# Patient Record
Sex: FEMALE | Race: BLACK OR AFRICAN AMERICAN | Employment: OTHER | URBAN - METROPOLITAN AREA
[De-identification: names, ages, dates, MRNs, and addresses within clinical notes are randomized per-mention and may not be internally consistent; named-entity substitution may affect disease eponyms.]

---

## 2017-09-19 ENCOUNTER — HOSPITAL ENCOUNTER (OUTPATIENT)
Dept: OCCUPATIONAL MEDICINE | Age: 52
Discharge: HOME | End: 2017-09-19
Attending: EMERGENCY MEDICINE

## 2017-09-19 DIAGNOSIS — Z00.00 PHYSICAL EXAM, ANNUAL: ICD-10-CM

## 2019-04-30 ENCOUNTER — HOSPITAL ENCOUNTER (EMERGENCY)
Age: 54
Discharge: HOME | End: 2019-04-30
Attending: EMERGENCY MEDICINE
Payer: COMMERCIAL

## 2019-04-30 ENCOUNTER — APPOINTMENT (OUTPATIENT)
Dept: GENERAL RADIOLOGY | Age: 54
End: 2019-04-30
Attending: PHYSICIAN ASSISTANT
Payer: COMMERCIAL

## 2019-04-30 VITALS
HEIGHT: 64 IN | HEART RATE: 95 BPM | TEMPERATURE: 98.1 F | DIASTOLIC BLOOD PRESSURE: 79 MMHG | OXYGEN SATURATION: 96 % | BODY MASS INDEX: 48.14 KG/M2 | WEIGHT: 282 LBS | RESPIRATION RATE: 20 BRPM | SYSTOLIC BLOOD PRESSURE: 134 MMHG

## 2019-04-30 DIAGNOSIS — R07.9 CHEST PAIN, UNSPECIFIED TYPE: Primary | ICD-10-CM

## 2019-04-30 LAB
ALBUMIN SERPL-MCNC: 3.7 G/DL (ref 3.4–5)
ALBUMIN/GLOB SERPL: 0.9 {RATIO} (ref 0.8–1.7)
ALP SERPL-CCNC: 63 U/L (ref 45–117)
ALT SERPL-CCNC: 31 U/L (ref 13–56)
ANION GAP SERPL CALC-SCNC: 5 MMOL/L (ref 3–18)
APPEARANCE UR: CLEAR
AST SERPL-CCNC: 26 U/L (ref 15–37)
ATRIAL RATE: 97 BPM
BASOPHILS # BLD: 0 K/UL (ref 0–0.1)
BASOPHILS NFR BLD: 0 % (ref 0–2)
BILIRUB SERPL-MCNC: 0.9 MG/DL (ref 0.2–1)
BILIRUB UR QL: NEGATIVE
BUN SERPL-MCNC: 11 MG/DL (ref 7–18)
BUN/CREAT SERPL: 13 (ref 12–20)
CALCIUM SERPL-MCNC: 10.3 MG/DL (ref 8.5–10.1)
CALCULATED P AXIS, ECG09: 40 DEGREES
CALCULATED R AXIS, ECG10: 9 DEGREES
CALCULATED T AXIS, ECG11: 43 DEGREES
CHLORIDE SERPL-SCNC: 107 MMOL/L (ref 100–108)
CK MB CFR SERPL CALC: 1 % (ref 0–4)
CK MB SERPL-MCNC: 3.5 NG/ML (ref 5–25)
CK SERPL-CCNC: 354 U/L (ref 26–192)
CO2 SERPL-SCNC: 31 MMOL/L (ref 21–32)
COLOR UR: YELLOW
CREAT SERPL-MCNC: 0.86 MG/DL (ref 0.6–1.3)
DIAGNOSIS, 93000: NORMAL
DIFFERENTIAL METHOD BLD: ABNORMAL
EOSINOPHIL # BLD: 0 K/UL (ref 0–0.4)
EOSINOPHIL NFR BLD: 0 % (ref 0–5)
ERYTHROCYTE [DISTWIDTH] IN BLOOD BY AUTOMATED COUNT: 13.4 % (ref 11.6–14.5)
GLOBULIN SER CALC-MCNC: 4.2 G/DL (ref 2–4)
GLUCOSE SERPL-MCNC: 88 MG/DL (ref 74–99)
GLUCOSE UR STRIP.AUTO-MCNC: NEGATIVE MG/DL
HCT VFR BLD AUTO: 39.5 % (ref 35–45)
HGB BLD-MCNC: 12.3 G/DL (ref 12–16)
HGB UR QL STRIP: NEGATIVE
KETONES UR QL STRIP.AUTO: 15 MG/DL
LEUKOCYTE ESTERASE UR QL STRIP.AUTO: NEGATIVE
LIPASE SERPL-CCNC: 141 U/L (ref 73–393)
LYMPHOCYTES # BLD: 1.8 K/UL (ref 0.9–3.6)
LYMPHOCYTES NFR BLD: 17 % (ref 21–52)
MAGNESIUM SERPL-MCNC: 2 MG/DL (ref 1.6–2.6)
MCH RBC QN AUTO: 28.8 PG (ref 24–34)
MCHC RBC AUTO-ENTMCNC: 31.1 G/DL (ref 31–37)
MCV RBC AUTO: 92.5 FL (ref 74–97)
MONOCYTES # BLD: 0.6 K/UL (ref 0.05–1.2)
MONOCYTES NFR BLD: 6 % (ref 3–10)
NEUTS SEG # BLD: 7.7 K/UL (ref 1.8–8)
NEUTS SEG NFR BLD: 77 % (ref 40–73)
NITRITE UR QL STRIP.AUTO: NEGATIVE
P-R INTERVAL, ECG05: 186 MS
PH UR STRIP: >8.5 [PH] (ref 5–8)
PLATELET # BLD AUTO: 306 K/UL (ref 135–420)
PMV BLD AUTO: 9.8 FL (ref 9.2–11.8)
POTASSIUM SERPL-SCNC: 3.7 MMOL/L (ref 3.5–5.5)
PROT SERPL-MCNC: 7.9 G/DL (ref 6.4–8.2)
PROT UR STRIP-MCNC: NEGATIVE MG/DL
Q-T INTERVAL, ECG07: 374 MS
QRS DURATION, ECG06: 92 MS
QTC CALCULATION (BEZET), ECG08: 474 MS
RBC # BLD AUTO: 4.27 M/UL (ref 4.2–5.3)
SODIUM SERPL-SCNC: 143 MMOL/L (ref 136–145)
SP GR UR REFRACTOMETRY: 1.02 (ref 1–1.03)
TROPONIN I SERPL-MCNC: <0.02 NG/ML (ref 0–0.04)
TROPONIN I SERPL-MCNC: <0.02 NG/ML (ref 0–0.04)
UROBILINOGEN UR QL STRIP.AUTO: 1 EU/DL (ref 0.2–1)
VENTRICULAR RATE, ECG03: 97 BPM
WBC # BLD AUTO: 10.1 K/UL (ref 4.6–13.2)

## 2019-04-30 PROCEDURE — 99284 EMERGENCY DEPT VISIT MOD MDM: CPT

## 2019-04-30 PROCEDURE — 85025 COMPLETE CBC W/AUTO DIFF WBC: CPT

## 2019-04-30 PROCEDURE — 82550 ASSAY OF CK (CPK): CPT

## 2019-04-30 PROCEDURE — 74011250637 HC RX REV CODE- 250/637: Performed by: PHYSICIAN ASSISTANT

## 2019-04-30 PROCEDURE — 83690 ASSAY OF LIPASE: CPT

## 2019-04-30 PROCEDURE — 81003 URINALYSIS AUTO W/O SCOPE: CPT

## 2019-04-30 PROCEDURE — 93005 ELECTROCARDIOGRAM TRACING: CPT

## 2019-04-30 PROCEDURE — 71046 X-RAY EXAM CHEST 2 VIEWS: CPT

## 2019-04-30 PROCEDURE — 80053 COMPREHEN METABOLIC PANEL: CPT

## 2019-04-30 PROCEDURE — 74011000250 HC RX REV CODE- 250: Performed by: PHYSICIAN ASSISTANT

## 2019-04-30 PROCEDURE — 83735 ASSAY OF MAGNESIUM: CPT

## 2019-04-30 RX ORDER — FAMOTIDINE 20 MG/1
20 TABLET, FILM COATED ORAL 2 TIMES DAILY
Qty: 20 TAB | Refills: 0 | Status: SHIPPED | OUTPATIENT
Start: 2019-04-30 | End: 2019-05-10

## 2019-04-30 RX ORDER — ASPIRIN 325 MG
325 TABLET ORAL
Status: COMPLETED | OUTPATIENT
Start: 2019-04-30 | End: 2019-04-30

## 2019-04-30 RX ORDER — MAG HYDROX/ALUMINUM HYD/SIMETH 200-200-20
30 SUSPENSION, ORAL (FINAL DOSE FORM) ORAL
Status: COMPLETED | OUTPATIENT
Start: 2019-04-30 | End: 2019-04-30

## 2019-04-30 RX ORDER — LIDOCAINE HYDROCHLORIDE 20 MG/ML
15 SOLUTION OROPHARYNGEAL
Status: COMPLETED | OUTPATIENT
Start: 2019-04-30 | End: 2019-04-30

## 2019-04-30 RX ADMIN — ALUMINUM HYDROXIDE, MAGNESIUM HYDROXIDE, AND SIMETHICONE 30 ML: 200; 200; 20 SUSPENSION ORAL at 13:37

## 2019-04-30 RX ADMIN — LIDOCAINE HYDROCHLORIDE 15 ML: 20 SOLUTION ORAL; TOPICAL at 13:37

## 2019-04-30 RX ADMIN — ASPIRIN 325 MG ORAL TABLET 325 MG: 325 PILL ORAL at 11:27

## 2019-04-30 NOTE — ED TRIAGE NOTES
Pt presents with chest pain x 4 days. Pt states was sent here by [facility] for same. States took baking soda and water yesterday evening, states pain went away but came back this morning. Pt denies sob, n/v/d. States pain is tender to touch.

## 2019-04-30 NOTE — DISCHARGE INSTRUCTIONS
Initial / Assessment/Plan of Care Note     Baseline Assessment  68 year old admitted 1/6/2018 as Inpatient with a diagnosis of periprosthetic fx.   Prior to admission patient was living with Spouse/significant other and residing at House.  Patient does  have a Power of  for Healthcare.  Document is not activated.  Agent is her .. Patient’s Primary Care Provider is Tyson Denson MD.     Medical History  Past Medical History:   Diagnosis Date   • Anemia     chronic intermittent, iron deficiency   • Anxiety Disorder    • Bowel obstruction    • Cataract, senile     Bilateral   • Cellulitis 2011    left lower extremity   • Depression    • Fracture     Left Knee Cap, Right ankle   • GERD (esophageal reflux)    • Hypertension    • Lymphedema    • Migraine    • Morbid obesity (CMS/[facility])    • Osteoarthrosis, unspecified whether generalized or localized, lower leg     Bilateral knees, periodic steroid injections   • Pneumonia     2014   • Raynaud's syndrome    • Right knee pain    • Right knee pain    • Use of cane as ambulatory aid    • UTI (lower urinary tract infection) 3/31/15   • Wears glasses        Prior to Admission Status  Functional Status  Ambulation: Cane  Bathing: Independent/Self  Dressing: Independent/Self  Toileting: Independent/Self  Meal Preparation: Independent/Self  Shopping: Independent/Self  Medication Preparation: Independent/Self  Medication Administration: Independent/Self  Housekeeping: Independent/Self  Laundry: Independent/Self  Transportation: Independent/Self    Agency/Support  Type of Services Prior to Hospitalization: None  Support Systems: Spouse/Significant other  Home Devices/Equipment: None  Mobility Assist Devices: None  Sensory Support Devices: Eyeglasses    Current Status  PT Ambulation Tips: Ambulation with therapy only  PT Transfer Tips: Use gait belt, Needs moderate assist, Use walker  OT Bathing Tips:    OT Dressing Tips:    OT Toileting Tips:    OT Feeding Tips:   Patient Education        Chest Pain: Care Instructions  Your Care Instructions    There are many things that can cause chest pain. Some are not serious and will get better on their own in a few days. But some kinds of chest pain need more testing and treatment. Your doctor may have recommended a follow-up visit in the next 8 to 12 hours. If you are not getting better, you may need more tests or treatment. Even though your doctor has released you, you still need to watch for any problems. The doctor carefully checked you, but sometimes problems can develop later. If you have new symptoms or if your symptoms do not get better, get medical care right away. If you have worse or different chest pain or pressure that lasts more than 5 minutes or you passed out (lost consciousness), call 911 or seek other emergency help right away. A medical visit is only one step in your treatment. Even if you feel better, you still need to do what your doctor recommends, such as going to all suggested follow-up appointments and taking medicines exactly as directed. This will help you recover and help prevent future problems. How can you care for yourself at home? · Rest until you feel better. · Take your medicine exactly as prescribed. Call your doctor if you think you are having a problem with your medicine. · Do not drive after taking a prescription pain medicine. When should you call for help? Call 911 if:    · You passed out (lost consciousness).     · You have severe difficulty breathing.     · You have symptoms of a heart attack. These may include:  ? Chest pain or pressure, or a strange feeling in your chest.  ? Sweating. ? Shortness of breath. ? Nausea or vomiting. ? Pain, pressure, or a strange feeling in your back, neck, jaw, or upper belly or in one or both shoulders or arms. ? Lightheadedness or sudden weakness. ? A fast or irregular heartbeat.   After you call 911, the  may tell you to chew 1   SLP Swallow/Feeding Tips:    SLP Comm/Cog Tips:    Current Mental Status:    Stressors:      Insurance  Primary: AARP MEDICARE COMPLETE  Secondary: N/A    Barriers to Discharge  Identified Barriers to Discharge/Transition Planning: Unresolved medical issues    Progress Note  Chart reviewed. Case discussed with pt's health care team (nursing;  and PT). Discharge planning began day of surgery by the interdisciplinary team as appropriate.   The team and writer are recommending discharge to subacute rehab. Pt will be NWB for up to 10 weeks. Introduced self and social work role.Discharge planning options discussed.Pt feels they will need a short stay in a subacute rehab facility to regain their strength; and to improve their transfers/ambulation skills. SNF list provided. Pt wants to discuss her subacute rehab options with her  later today and has requested that writer f/u with her tomorrow.       Plan  SW/CM - Recommendations for Discharge:  subacute  PT - Recommendations for Discharge: Post acute therapy, Home, Home therapy (pending progress)  OT - Recommendations for Discharge:    SLP - Recommendations for Discharge:    Anticipate patient will need post-hospital services. Necessary services are available.  Anticipate patient cannot return to the environment from which patient entered the hospital.   Anticipate patient cannot provide self-care at discharge.    Refer to SW/CM Flowsheet for Goals and objective data.      adult-strength or 2 to 4 low-dose aspirin. Wait for an ambulance. Do not try to drive yourself.    Call your doctor today if:    · You have any trouble breathing.     · Your chest pain gets worse.     · You are dizzy or lightheaded, or you feel like you may faint.     · You are not getting better as expected.     · You are having new or different chest pain. Where can you learn more? Go to http://catalino-nilsa.info/. Enter A120 in the search box to learn more about \"Chest Pain: Care Instructions. \"  Current as of: September 23, 2018  Content Version: 11.9  © 7548-1669 [employer]. Care instructions adapted under license by [employer] (which disclaims liability or warranty for this information). If you have questions about a medical condition or this instruction, always ask your healthcare professional. [place] [address] any warranty or liability for your use of this information. Patient Education        Musculoskeletal Chest Pain: Care Instructions  Your Care Instructions    Chest pain is not always a sign that something is wrong with your heart or that you have another serious problem. The doctor thinks your chest pain is caused by strained muscles or ligaments, inflamed chest cartilage, or another problem in your chest, rather than by your heart. You may need more tests to find the cause of your chest pain. Follow-up care is a key part of your treatment and safety. Be sure to make and go to all appointments, and call your doctor if you are having problems. It's also a good idea to know your test results and keep a list of the medicines you take. How can you care for yourself at home? · Take pain medicines exactly as directed. ? If the doctor gave you a prescription medicine for pain, take it as prescribed. ? If you are not taking a prescription pain medicine, ask your doctor if you can take an over-the-counter medicine.   · Rest and protect the sore area. · Stop, change, or take a break from any activity that may be causing your pain or soreness. · Put ice or a cold pack on the sore area for 10 to 20 minutes at a time. Try to do this every 1 to 2 hours for the next 3 days (when you are awake) or until the swelling goes down. Put a thin cloth between the ice and your skin. · After 2 or 3 days, apply a heating pad set on low or a warm cloth to the area that hurts. Some doctors suggest that you go back and forth between hot and cold. · Do not wrap or tape your ribs for support. This may cause you to take smaller breaths, which could increase your risk of lung problems. · Mentholated creams such as Bengay or Icy Hot may soothe sore muscles. Follow the instructions on the package. · Follow your doctor's instructions for exercising. · Gentle stretching and massage may help you get better faster. Stretch slowly to the point just before pain begins, and hold the stretch for at least 15 to 30 seconds. Do this 3 or 4 times a day. Stretch just after you have applied heat. · As your pain gets better, slowly return to your normal activities. Any increased pain may be a sign that you need to rest a while longer. When should you call for help? Call 911 anytime you think you may need emergency care. For example, call if:    · You have chest pain or pressure. This may occur with:  ? Sweating. ? Shortness of breath. ? Nausea or vomiting. ? Pain that spreads from the chest to the neck, jaw, or one or both shoulders or arms. ? Dizziness or lightheadedness. ? A fast or uneven pulse. After calling 911, chew 1 adult-strength aspirin. Wait for an ambulance.  Do not try to drive yourself.     · You have sudden chest pain and shortness of breath, or you cough up blood.    Call your doctor now or seek immediate medical care if:    · You have any trouble breathing.     · Your chest pain gets worse.     · Your chest pain occurs consistently with exercise and is relieved by rest.    Watch closely for changes in your health, and be sure to contact your doctor if:    · Your chest pain does not get better after 1 week. Where can you learn more? Go to http://catalino-nilsa.info/. Enter V293 in the search box to learn more about \"Musculoskeletal Chest Pain: Care Instructions. \"  Current as of: 2018  Content Version: 11.9  © 1590-8669 [place]. Care instructions adapted under license by [employer] (which disclaims liability or warranty for this information). If you have questions about a medical condition or this instruction, always ask your healthcare professional. [address] any warranty or liability for your use of this information. Togethera Activation    Thank you for requesting access to Togethera. Please follow the instructions below to securely access and download your online medical record. Togethera allows you to send messages to your doctor, view your test results, renew your prescriptions, schedule appointments, and more. How Do I Sign Up? 1. In your internet browser, go to [email or web address].  2. Click on the First Time User? Click Here link in the Sign In box. You will be redirect to the New Member Sign Up page. 3. Enter your Togethera Access Code exactly as it appears below. You will not need to use this code after youve completed the sign-up process. If you do not sign up before the expiration date, you must request a new code. Togethera Access Code: [email or web address]  Expires: 2019 10:42 AM (This is the date your Togethera access code will )    4. Enter the last four digits of your Social Security Number (xxxx) and Date of Birth (mm/dd/yyyy) as indicated and click Submit. You will be taken to the next sign-up page. 5. Create a Togethera ID. This will be your Togethera login ID and cannot be changed, so think of one that is secure and easy to remember.   6. Create a [employer] password. You can change your password at any time. 7. Enter your Password Reset Question and Answer. This can be used at a later time if you forget your password. 8. Enter your e-mail address. You will receive e-mail notification when new information is available in [address] 9. Click Sign Up. You can now view and download portions of your medical record. 10. Click the Download Summary menu link to download a portable copy of your medical information. Additional Information    If you have questions, please visit the Frequently Asked Questions section of the [employer] website at [email or web address] Remember, [employer] is NOT to be used for urgent needs. For medical emergencies, dial 911. Complete all medications as prescribed. Follow-up with cardiology and GI this week. Return to the ED immediately for any new or worsening symptoms.

## 2019-04-30 NOTE — ED PROVIDER NOTES
EMERGENCY DEPARTMENT HISTORY AND PHYSICAL EXAM    Date: 4/30/2019  Patient Name: Jennifer Vargas    History of Presenting Illness     Chief Complaint   Patient presents with    Chest Pain         History Provided By: Patient    Chief Complaint: Chest Pain  Duration: 4 Days  Timing:  Acute  Location: Mid chest, left-side  Quality: Stabbing and Sharp  Severity: 8 out of 10  Modifying Factors: Water+baking soda drink improved symptoms  Associated Symptoms: diarrhea      Additional History (Context): Jennifer Vargas is a 48 y.o. female with hypertension and high cholesterol who presents with complaint of non-radiating chest pain x4 days. Patient states that she was at work on Saturday when \"the pain came out of nowhere\". The pain was sharp in her mid chest and \"stabbed through from front to back\". She took Tums because she thought it was gas but it brought no relief. The pain continued intermittently Sunday and Monday. She also endorses two to three episodes of watery diarrhea on Monday. She also endorses leg swelling, bilaterally, on Monday because she hasn't taken her \"water pills\" in a few days. She denies any hematochezia. Patient states at 6pm on Monday, her mother made her a drink of water and baking soda and that provided some pain relief and enabled her to sleep. However, she woke up this morning with a stabbing chest pain and felt her heart \"racing and skipping beats\". She states she has been under an exorbitant amount of stress at her job lately. She has felt this pain before 15 years ago which showed no cardiac abnormalities and deemed stress-induced. She denies any fever, headache, numbness, tingling, vision changes, nausea, vomiting, dizziness, SOB, abdominal pain, dysuria.         PCP: None    Current Outpatient Medications   Medication Sig Dispense Refill    OTHER Indications: unknown meds for htn and high choleterol      famotidine (PEPCID) 20 mg tablet Take 1 Tab by mouth two (2) times a day for 10 days. 20 Tab 0       Past History     Past Medical History:  Past Medical History:   Diagnosis Date    Arthritis     High cholesterol     Hypertension     Sleep apnea        Past Surgical History:  History reviewed. No pertinent surgical history. Family History:  Family History   Problem Relation Age of Onset    Diabetes Mother     Hypertension Father        Social History:  Social History     Tobacco Use    Smoking status: Current Every Day Smoker     Packs/day: 0.50     Years: 1.00     Pack years: 0.50   Substance Use Topics    Alcohol use: Yes    Drug use: Not on file       Allergies:  No Known Allergies      Review of Systems   Review of Systems   Constitutional: Negative. Negative for chills, diaphoresis and fever. HENT: Negative. Negative for congestion, ear pain, rhinorrhea and sore throat. Eyes: Negative. Negative for pain, redness and visual disturbance. Respiratory: Negative. Negative for cough, shortness of breath, wheezing and stridor. Cardiovascular: Positive for chest pain (mid chest, left-side), palpitations and leg swelling. Gastrointestinal: Positive for diarrhea. Negative for abdominal pain, blood in stool, constipation, nausea and vomiting. Genitourinary: Negative. Negative for dysuria, flank pain, frequency and hematuria. Musculoskeletal: Positive for back pain. Negative for myalgias, neck pain and neck stiffness. Skin: Negative. Negative for rash and wound. Neurological: Negative. Negative for dizziness, seizures, syncope, speech difficulty, weakness, light-headedness, numbness and headaches. All other systems reviewed and are negative. All Other Systems Negative  Physical Exam     Vitals:    04/30/19 1055   BP: 134/79   Pulse: 95   Resp: 20   Temp: 98.1 °F (36.7 °C)   SpO2: 96%   Weight: 127.9 kg (282 lb)   Height: 5' 4\" (1.626 m)     Physical Exam   Constitutional: She is oriented to person, place, and time.  She appears well-developed and well-nourished. No distress. HENT:   Head: Normocephalic and atraumatic. Right Ear: External ear normal.   Left Ear: External ear normal.   Nose: Nose normal.   Eyes: Pupils are equal, round, and reactive to light. Conjunctivae and EOM are normal. Right eye exhibits no discharge. Left eye exhibits no discharge. No scleral icterus. Neck: Normal range of motion. Neck supple. Cardiovascular: Normal rate and regular rhythm. Pulmonary/Chest: Effort normal and breath sounds normal. No respiratory distress. She has no wheezes. She exhibits tenderness. She exhibits no bony tenderness. Abdominal: Soft. Bowel sounds are normal. She exhibits no distension. There is no tenderness. Musculoskeletal: Normal range of motion. She exhibits no edema. Lymphadenopathy:     She has no cervical adenopathy. Neurological: She is alert and oriented to person, place, and time. No cranial nerve deficit. Skin: Skin is warm and dry. No rash noted. She is not diaphoretic. Psychiatric: She has a normal mood and affect. Her behavior is normal. Judgment and thought content normal.   Nursing note and vitals reviewed.              Diagnostic Study Results     Labs -     Recent Results (from the past 12 hour(s))   EKG, 12 LEAD, INITIAL    Collection Time: 04/30/19 10:49 AM   Result Value Ref Range    Ventricular Rate 97 BPM    Atrial Rate 97 BPM    P-R Interval 186 ms    QRS Duration 92 ms    Q-T Interval 374 ms    QTC Calculation (Bezet) 474 ms    Calculated P Axis 40 degrees    Calculated R Axis 9 degrees    Calculated T Axis 43 degrees    Diagnosis       Normal sinus rhythm  Possible Left atrial enlargement  RSR' or QR pattern in V1 suggests right ventricular conduction delay  Borderline ECG     CBC WITH AUTOMATED DIFF    Collection Time: 04/30/19 11:45 AM   Result Value Ref Range    WBC 10.1 4.6 - 13.2 K/uL    RBC 4.27 4.20 - 5.30 M/uL    HGB 12.3 12.0 - 16.0 g/dL    HCT 39.5 35.0 - 45.0 %    MCV 92.5 74.0 - 97.0 FL    MCH 28.8 24.0 - 34.0 PG    MCHC 31.1 31.0 - 37.0 g/dL    RDW 13.4 11.6 - 14.5 %    PLATELET 947 154 - 472 K/uL    MPV 9.8 9.2 - 11.8 FL    NEUTROPHILS 77 (H) 40 - 73 %    LYMPHOCYTES 17 (L) 21 - 52 %    MONOCYTES 6 3 - 10 %    EOSINOPHILS 0 0 - 5 %    BASOPHILS 0 0 - 2 %    ABS. NEUTROPHILS 7.7 1.8 - 8.0 K/UL    ABS. LYMPHOCYTES 1.8 0.9 - 3.6 K/UL    ABS. MONOCYTES 0.6 0.05 - 1.2 K/UL    ABS. EOSINOPHILS 0.0 0.0 - 0.4 K/UL    ABS. BASOPHILS 0.0 0.0 - 0.1 K/UL    DF AUTOMATED     METABOLIC PANEL, COMPREHENSIVE    Collection Time: 04/30/19 11:45 AM   Result Value Ref Range    Sodium 143 136 - 145 mmol/L    Potassium 3.7 3.5 - 5.5 mmol/L    Chloride 107 100 - 108 mmol/L    CO2 31 21 - 32 mmol/L    Anion gap 5 3.0 - 18 mmol/L    Glucose 88 74 - 99 mg/dL    BUN 11 7.0 - 18 MG/DL    Creatinine 0.86 0.6 - 1.3 MG/DL    BUN/Creatinine ratio 13 12 - 20      GFR est AA >60 >60 ml/min/1.73m2    GFR est non-AA >60 >60 ml/min/1.73m2    Calcium 10.3 (H) 8.5 - 10.1 MG/DL    Bilirubin, total 0.9 0.2 - 1.0 MG/DL    ALT (SGPT) 31 13 - 56 U/L    AST (SGOT) 26 15 - 37 U/L    Alk.  phosphatase 63 45 - 117 U/L    Protein, total 7.9 6.4 - 8.2 g/dL    Albumin 3.7 3.4 - 5.0 g/dL    Globulin 4.2 (H) 2.0 - 4.0 g/dL    A-G Ratio 0.9 0.8 - 1.7     MAGNESIUM    Collection Time: 04/30/19 11:45 AM   Result Value Ref Range    Magnesium 2.0 1.6 - 2.6 mg/dL   LIPASE    Collection Time: 04/30/19 11:45 AM   Result Value Ref Range    Lipase 141 73 - 393 U/L   CARDIAC PANEL,(CK, CKMB & TROPONIN)    Collection Time: 04/30/19 11:45 AM   Result Value Ref Range     (H) 26 - 192 U/L    CK - MB 3.5 <3.6 ng/ml    CK-MB Index 1.0 0.0 - 4.0 %    Troponin-I, QT <0.02 0.0 - 0.045 NG/ML   URINALYSIS W/ RFLX MICROSCOPIC    Collection Time: 04/30/19 11:47 AM   Result Value Ref Range    Color YELLOW      Appearance CLEAR      Specific gravity 1.019 1.005 - 1.030      pH (UA) >8.5 (H) 5.0 - 8.0    Protein NEGATIVE  NEG mg/dL    Glucose NEGATIVE  NEG mg/dL    Ketone 15 (A) NEG mg/dL    Bilirubin NEGATIVE  NEG      Blood NEGATIVE  NEG      Urobilinogen 1.0 0.2 - 1.0 EU/dL    Nitrites NEGATIVE  NEG      Leukocyte Esterase NEGATIVE  NEG     TROPONIN I    Collection Time: 04/30/19  1:30 PM   Result Value Ref Range    Troponin-I, QT <0.02 0.0 - 0.045 NG/ML       Radiologic Studies -   XR CHEST PA LAT   Final Result   IMPRESSION:   Hypoinflated lungs with likely bibasilar subsegmental atelectasis. Superimposed   infiltrate not excluded. CT Results  (Last 48 hours)    None        CXR Results  (Last 48 hours)               04/30/19 1110  XR CHEST PA LAT Final result    Impression:  IMPRESSION:   Hypoinflated lungs with likely bibasilar subsegmental atelectasis. Superimposed   infiltrate not excluded. Narrative:  EXAM: XR CHEST PA LAT       INDICATION: 48 years Female. chest pain. ADDITIONAL HISTORY: None. TECHNIQUE: Frontal and lateral views of the chest.       COMPARISON: 3/31/2010       FINDINGS:       The lungs are hypoinflated. On the frontal view the right costophrenic sulcus is   partially excluded from the field-of-view, suboptimal.       The cardiac silhouette is within normal limits in size. Mild tortuosity the   aorta. Atherosclerotic calcifications are noted in the aorta. There are streaky right greater than left medial basilar opacities. There is no evidence of pleural effusion. There is no evidence of pneumothorax. No evidence of acute osseous abnormality. Mild thoracic spondylosis. Medical Decision Making   I am the first provider for this patient. I reviewed the vital signs, available nursing notes, past medical history, past surgical history, family history and social history. Vital Signs-Reviewed the patient's vital signs. Records Reviewed: Marlen Sandoval PA-C     Procedures:  Procedures    Provider Notes (Medical Decision Making): Impression:  Chest pain unspec.     IV inserted, aspirin and GI cocktail given, sx improved  Labs essentially normal including second troponin. Chest x-ray cannot rule out superimposed infiltrate, however pt has no cough/respiratory sx and is afebrile. EKG shows no acute changes or STEMI. Pt's vitals are normal. BP is normal, lessening the likelihood for aortic dissection. PERC score 1, as her age is 48 (>50). Very unlikely for PE, however I have discussed the signs and sx of PE/DVT with the pt. Pain is reproducible on exam and pt also notes GI sx such as increased belching and reflux. Pt is obese and has a hx of hypercholesterolemia. Will plan to d/c with outpatient orders for stress test with close cardiology follow-up. Low threshold for returning to the ED for any new or worsening sx. Pt agrees with this plan. Marlen Sandoval PA-C     MED RECONCILIATION:  No current facility-administered medications for this encounter. Current Outpatient Medications   Medication Sig    OTHER Indications: unknown meds for htn and high choleterol    famotidine (PEPCID) 20 mg tablet Take 1 Tab by mouth two (2) times a day for 10 days. Disposition:  D/c    DISCHARGE NOTE:   Patient is stable for discharge at this time. Rx for pepcid given. Rest and follow-up with PCP this week. Return to the ED immediately for any new or worsening sx.   Marlen Gloria PA-C 3:30 PM     Follow-up Information     Follow up With Specialties Details Why Contact Info    Misael Salgado DO Cardiology Schedule an appointment as soon as possible for a visit in 3 days  [address] [facility] [address] [facility]      Tariq Velazquez MD Gastroenterology Schedule an appointment as soon as possible for a visit in 1 week  [address] [facility] EMERGENCY DEPT Emergency Medicine  As needed, If symptoms worsen [address] [facility]  [phone]          Current Discharge Medication List      START taking these medications    Details   famotidine (PEPCID) 20 mg tablet Take 1 Tab by mouth two (2) times a day for 10 days. Qty: 20 Tab, Refills: 0             Diagnosis     Clinical Impression:   1.  Chest pain, unspecified type

## 2019-04-30 NOTE — PROGRESS NOTES
conducted an initial consultation and Spiritual Assessment for Yosi Mesa, who is a 48 y.o.,female. Patients Primary Language is: [employer]. According to the patients EMR Jain Affiliation is: Other. The reason the Patient came to the hospital is: There are no active problems to display for this patient. The  provided the following Interventions:  Initiated a relationship of care and support. Explored issues of rudy, belief, spirituality and Jewish/ritual needs while waiting for lab results. Listened empathically. Provided information about Spiritual Care Services. Offered  assurance of continued prayers on patient's behalf. Chart reviewed. The following outcomes where achieved:  Patient shared limited information about both her medical narrative and spiritual journey/beliefs.  confirmed Patient's \"Episcopalian\" Jain Affiliation. Patient processed feeling about current hospitalization. Patient expressed gratitude for 's visit. Assessment:  Patient denies any emotional concerns or spiritual needs. Patient does not have any Jewish/cultural needs that will affect patients preferences in health care. There are no spiritual or Jewish issues which require intervention at this time. Plan:  Chaplains will continue to follow and will provide pastoral care on an as needed/requested basis.  recommends bedside caregivers page  on duty if patient shows signs of acute spiritual or emotional distress.     Chaplain Resident [address]   [phone]

## 2020-09-30 ENCOUNTER — TRANSCRIBE ORDER (OUTPATIENT)
Dept: SCHEDULING | Age: 55
End: 2020-09-30

## 2020-09-30 DIAGNOSIS — N63.15 BREAST LUMP ON RIGHT SIDE AT 3 O'CLOCK POSITION: Primary | ICD-10-CM

## 2020-10-01 ENCOUNTER — HOSPITAL ENCOUNTER (OUTPATIENT)
Dept: MAMMOGRAPHY | Age: 55
Discharge: HOME | End: 2020-10-01
Attending: SURGERY
Payer: COMMERCIAL

## 2020-10-01 DIAGNOSIS — N63.15 BREAST LUMP ON RIGHT SIDE AT 3 O'CLOCK POSITION: ICD-10-CM

## 2020-10-01 PROCEDURE — 77065 DX MAMMO INCL CAD UNI: CPT

## 2020-10-23 ENCOUNTER — HOSPITAL ENCOUNTER (OUTPATIENT)
Dept: RADIATION THERAPY | Age: 55
Discharge: HOME | End: 2020-10-23
Payer: COMMERCIAL

## 2020-10-23 PROCEDURE — 99211 OFF/OP EST MAY X REQ PHY/QHP: CPT

## 2021-03-31 PROBLEM — C50.911 BREAST CANCER, RIGHT BREAST (HCC): Status: ACTIVE | Noted: 2021-03-31

## 2021-04-23 ENCOUNTER — HOSPITAL ENCOUNTER (OUTPATIENT)
Dept: RADIATION THERAPY | Age: 56
Discharge: HOME | End: 2021-04-23
Payer: COMMERCIAL

## 2021-04-23 PROCEDURE — 99211 OFF/OP EST MAY X REQ PHY/QHP: CPT

## 2021-05-05 ENCOUNTER — HOSPITAL ENCOUNTER (OUTPATIENT)
Dept: RADIATION THERAPY | Age: 56
End: 2021-05-05
Payer: COMMERCIAL

## 2021-05-06 ENCOUNTER — HOSPITAL ENCOUNTER (OUTPATIENT)
Dept: RADIATION THERAPY | Age: 56
Discharge: HOME | End: 2021-05-06
Payer: COMMERCIAL

## 2021-05-06 PROCEDURE — 77290 THER RAD SIMULAJ FIELD CPLX: CPT

## 2021-05-07 ENCOUNTER — HOSPITAL ENCOUNTER (OUTPATIENT)
Dept: RADIATION THERAPY | Age: 56
Discharge: HOME | End: 2021-05-07
Payer: COMMERCIAL

## 2021-05-07 PROCEDURE — 77300 RADIATION THERAPY DOSE PLAN: CPT

## 2021-05-07 PROCEDURE — 77295 3-D RADIOTHERAPY PLAN: CPT

## 2021-05-07 PROCEDURE — 77334 RADIATION TREATMENT AID(S): CPT

## 2021-05-13 ENCOUNTER — HOSPITAL ENCOUNTER (OUTPATIENT)
Dept: RADIATION THERAPY | Age: 56
Discharge: HOME | End: 2021-05-13
Payer: COMMERCIAL

## 2021-05-13 PROCEDURE — 77280 THER RAD SIMULAJ FIELD SMPL: CPT

## 2021-05-13 PROCEDURE — 77412 RADIATION TX DELIVERY LVL 3: CPT

## 2021-05-14 ENCOUNTER — HOSPITAL ENCOUNTER (OUTPATIENT)
Dept: RADIATION THERAPY | Age: 56
Discharge: HOME | End: 2021-05-14
Payer: COMMERCIAL

## 2021-05-14 PROCEDURE — 77412 RADIATION TX DELIVERY LVL 3: CPT

## 2021-05-17 ENCOUNTER — HOSPITAL ENCOUNTER (OUTPATIENT)
Dept: RADIATION THERAPY | Age: 56
Discharge: HOME | End: 2021-05-17
Payer: COMMERCIAL

## 2021-05-17 PROCEDURE — 77412 RADIATION TX DELIVERY LVL 3: CPT

## 2021-05-18 ENCOUNTER — HOSPITAL ENCOUNTER (OUTPATIENT)
Dept: RADIATION THERAPY | Age: 56
Discharge: HOME | End: 2021-05-18
Payer: COMMERCIAL

## 2021-05-18 PROCEDURE — 77412 RADIATION TX DELIVERY LVL 3: CPT

## 2021-05-19 ENCOUNTER — HOSPITAL ENCOUNTER (OUTPATIENT)
Dept: RADIATION THERAPY | Age: 56
Discharge: HOME | End: 2021-05-19
Payer: COMMERCIAL

## 2021-05-19 PROCEDURE — 77412 RADIATION TX DELIVERY LVL 3: CPT

## 2021-05-20 ENCOUNTER — HOSPITAL ENCOUNTER (OUTPATIENT)
Dept: RADIATION THERAPY | Age: 56
Discharge: HOME | End: 2021-05-20
Payer: COMMERCIAL

## 2021-05-20 ENCOUNTER — DOCUMENTATION ONLY (OUTPATIENT)
Dept: RADIATION THERAPY | Age: 56
End: 2021-05-20

## 2021-05-20 PROCEDURE — 77412 RADIATION TX DELIVERY LVL 3: CPT

## 2021-05-20 PROCEDURE — 77417 THER RADIOLOGY PORT IMAGE(S): CPT

## 2021-05-21 ENCOUNTER — HOSPITAL ENCOUNTER (OUTPATIENT)
Dept: RADIATION THERAPY | Age: 56
Discharge: HOME | End: 2021-05-21
Payer: COMMERCIAL

## 2021-05-21 PROCEDURE — 77412 RADIATION TX DELIVERY LVL 3: CPT

## 2021-05-21 NOTE — PROGRESS NOTES
Medical Nutrition Therapy Note    Nutritional Assessment:  IBW: Patient weight not recorded   UBW: unk  Wt hx:   Wt Readings from Last 20 Encounters:   04/06/21 287 lb (130.2 kg)   03/31/21 290 lb 2 oz (131.6 kg)   03/31/21 290 lb 2 oz (131.6 kg)   03/04/21 287 lb (130.2 kg)   11/04/20 287 lb (130.2 kg)   04/30/19 282 lb (127.9 kg)   09/30/13 260 lb (117.9 kg)   09/29/13 260 lb (117.9 kg)   10/24/12 269 lb (122 kg)     Current Ht:   Ht Readings from Last 1 Encounters:   04/06/21 5' 6\" (1.676 m)       Current BMI: Estimated body mass index is 46.32 kg/m² as calculated from the following:    Height as of 4/6/21: 5' 6\" (1.676 m). Weight as of 4/6/21: 287 lb (130.2 kg). Wt change: 0.6% weight loss over the last 1 month(s) (per [facility] documented weights)    Estimated nutritional needs:   Calorie Range: 2440   Formula: MSJ x 1.25  Protein Range: 130-155  Formula: 1.0-1.2 g/kg  Fluid Needs: 1 mL/kcal    Nutrition-impact symptoms: None    05/21/21: Pt requested meeting with writer. Pt asked why she's gaining weight. Pt endorses alcohol and smoking cessation after being diagnosed with cancer. Pt reports having a difficult time and being stressed regarding work, bills, family, and cancer. Pt stated that she'll grab chips, sweets, and cookies while at home. Pt acknowledged what foods are calorically dense and that stress is currently a major barrier for her. Pt mentioned that her physical activity level has declined now that she's not working. Nutritional Diagnosis: poor nutritional quality of life related to food or activity behavior-related difficulty as evidenced by new medical diagnosis (cancer) and recent lifestyle or life changes (quit smoking, quit drinking, & work change)    Nutritional Interventions:   1. Guiding pt with making lifestyle changes  2. Pt agreed to have oncology social worker speak with her    Nutritional Monitoring/Goals:   1.  Pt set goal #1: will walk at least 3x per week    Initial consult per other: patient self-referral    Cancer Dx: No matching staging information was found for the patient. Cancer Staging  No matching staging information was found for the patient. Treatment Plan: No flowsheet data found. +   Radiation Treatments     No radiation treatments to show.  (Treatments may have been administered in another system.)          Lab Results   Component Value Date/Time    Sodium 141 03/24/2021 02:39 PM    Sodium 143 04/30/2019 11:45 AM    Sodium 140 09/30/2013 05:27 PM    Sodium 140 03/31/2010 05:32 PM    Potassium 3.2 (L) 03/24/2021 02:39 PM    Chloride 107 03/24/2021 02:39 PM    Chloride 107 04/30/2019 11:45 AM    Chloride 106 09/30/2013 05:27 PM    Chloride 101 03/31/2010 05:32 PM    CO2 31 03/24/2021 02:39 PM    CO2 31 04/30/2019 11:45 AM    CO2 28 09/30/2013 05:27 PM    CO2 29 03/31/2010 05:32 PM    Anion gap 4 (L) 03/24/2021 02:39 PM    Anion gap 5 04/30/2019 11:45 AM    Anion gap 6 09/30/2013 05:27 PM    Anion gap 10 03/31/2010 05:32 PM    Glucose 133 (H) 03/24/2021 02:39 PM    Glucose 88 04/30/2019 11:45 AM    Glucose 84 09/30/2013 05:27 PM    Glucose 88 03/31/2010 05:32 PM    BUN 12 03/24/2021 02:39 PM    BUN 11 04/30/2019 11:45 AM    BUN 9 09/30/2013 05:27 PM    BUN 17 03/31/2010 05:32 PM    Creatinine 0.8 03/24/2021 02:39 PM    Creatinine 0.86 04/30/2019 11:45 AM    Creatinine 0.86 09/30/2013 05:27 PM    Creatinine 0.8 03/31/2010 05:32 PM    BUN/Creatinine ratio 13 04/30/2019 11:45 AM    BUN/Creatinine ratio 10 (L) 09/30/2013 05:27 PM    BUN/Creatinine ratio 21 (H) 03/31/2010 05:32 PM    GFR est AA >60.0 03/24/2021 02:39 PM    GFR est AA >60 04/30/2019 11:45 AM    GFR est AA >60 09/30/2013 05:27 PM    GFR est AA >60 03/31/2010 05:32 PM    GFR est non-AA >60 03/24/2021 02:39 PM    GFR est non-AA >60 04/30/2019 11:45 AM    GFR est non-AA >60 09/30/2013 05:27 PM    GFR est non-AA >60 03/31/2010 05:32 PM    Calcium 9.9 03/24/2021 02:39 PM    Calcium 10.3 (H) 04/30/2019 11:45 AM    Calcium 8.9 09/30/2013 05:27 PM    Calcium 9.3 03/31/2010 05:32 PM         Current Outpatient Medications:     OTHER, Cancer medication daily, Disp: , Rfl:     cholecalciferol, vitamin D3, (Vitamin D3) 50 mcg (2,000 unit) tab, Take 1 Tab by mouth daily. , Disp: , Rfl:     rosuvastatin (CRESTOR) 10 mg tablet, Take 10 mg by mouth nightly., Disp: , Rfl:     chlorthalidone (HYGROTON) 25 mg tablet, Take 25 mg by mouth daily. , Disp: , Rfl:     amLODIPine (NORVASC) 5 mg tablet, Take 5 mg by mouth daily. , Disp: , Rfl:     Diet/po intake: unk

## 2021-05-24 ENCOUNTER — HOSPITAL ENCOUNTER (OUTPATIENT)
Dept: LAB | Age: 56
Discharge: HOME | End: 2021-05-24
Payer: COMMERCIAL

## 2021-05-24 ENCOUNTER — HOSPITAL ENCOUNTER (OUTPATIENT)
Dept: RADIATION THERAPY | Age: 56
Discharge: HOME | End: 2021-05-24
Payer: COMMERCIAL

## 2021-05-24 ENCOUNTER — OFFICE VISIT (OUTPATIENT)
Dept: INTERNAL MEDICINE CLINIC | Age: 56
End: 2021-05-24
Payer: COMMERCIAL

## 2021-05-24 ENCOUNTER — TELEPHONE (OUTPATIENT)
Dept: INTERNAL MEDICINE CLINIC | Age: 56
End: 2021-05-24

## 2021-05-24 VITALS
DIASTOLIC BLOOD PRESSURE: 69 MMHG | TEMPERATURE: 96.6 F | WEIGHT: 293 LBS | SYSTOLIC BLOOD PRESSURE: 126 MMHG | HEIGHT: 66 IN | OXYGEN SATURATION: 98 % | HEART RATE: 96 BPM | BODY MASS INDEX: 47.09 KG/M2 | RESPIRATION RATE: 16 BRPM

## 2021-05-24 DIAGNOSIS — Z12.11 COLON CANCER SCREENING: ICD-10-CM

## 2021-05-24 DIAGNOSIS — I10 ESSENTIAL HYPERTENSION: ICD-10-CM

## 2021-05-24 DIAGNOSIS — R73.01 ELEVATED FASTING GLUCOSE: ICD-10-CM

## 2021-05-24 DIAGNOSIS — R73.03 PREDIABETES: ICD-10-CM

## 2021-05-24 DIAGNOSIS — E78.2 MIXED HYPERLIPIDEMIA: ICD-10-CM

## 2021-05-24 DIAGNOSIS — F43.9 STRESS: Primary | ICD-10-CM

## 2021-05-24 DIAGNOSIS — Z13.31 POSITIVE DEPRESSION SCREENING: ICD-10-CM

## 2021-05-24 DIAGNOSIS — Z76.89 ENCOUNTER TO ESTABLISH CARE: Primary | ICD-10-CM

## 2021-05-24 DIAGNOSIS — C50.911 MALIGNANT NEOPLASM OF RIGHT FEMALE BREAST, UNSPECIFIED ESTROGEN RECEPTOR STATUS, UNSPECIFIED SITE OF BREAST (HCC): ICD-10-CM

## 2021-05-24 DIAGNOSIS — E87.6 HYPOKALEMIA: ICD-10-CM

## 2021-05-24 DIAGNOSIS — M25.473 ANKLE SWELLING, UNSPECIFIED LATERALITY: ICD-10-CM

## 2021-05-24 DIAGNOSIS — E66.9 OBESE BODY HABITUS: ICD-10-CM

## 2021-05-24 DIAGNOSIS — F43.9 STRESS: ICD-10-CM

## 2021-05-24 LAB
ALBUMIN SERPL-MCNC: 3.6 G/DL (ref 3.4–5)
ALBUMIN/GLOB SERPL: 0.9 {RATIO} (ref 0.8–1.7)
ALP SERPL-CCNC: 78 U/L (ref 45–117)
ALT SERPL-CCNC: 30 U/L (ref 13–56)
ANION GAP SERPL CALC-SCNC: 5 MMOL/L (ref 3–18)
AST SERPL-CCNC: 23 U/L (ref 10–38)
BILIRUB SERPL-MCNC: 0.3 MG/DL (ref 0.2–1)
BUN SERPL-MCNC: 21 MG/DL (ref 7–18)
BUN/CREAT SERPL: 24 (ref 12–20)
CALCIUM SERPL-MCNC: 9.4 MG/DL (ref 8.5–10.1)
CHLORIDE SERPL-SCNC: 104 MMOL/L (ref 100–111)
CHOLEST SERPL-MCNC: 220 MG/DL
CO2 SERPL-SCNC: 31 MMOL/L (ref 21–32)
CREAT SERPL-MCNC: 0.86 MG/DL (ref 0.6–1.3)
EST. AVERAGE GLUCOSE BLD GHB EST-MCNC: 120 MG/DL
GLOBULIN SER CALC-MCNC: 3.8 G/DL (ref 2–4)
GLUCOSE SERPL-MCNC: 108 MG/DL (ref 74–99)
HBA1C MFR BLD: 5.8 % (ref 4.2–5.6)
HDLC SERPL-MCNC: 37 MG/DL (ref 40–60)
HDLC SERPL: 5.9 {RATIO} (ref 0–5)
LDLC SERPL CALC-MCNC: 156 MG/DL (ref 0–100)
LIPID PROFILE,FLP: ABNORMAL
POTASSIUM SERPL-SCNC: 3.3 MMOL/L (ref 3.5–5.5)
PROT SERPL-MCNC: 7.4 G/DL (ref 6.4–8.2)
SODIUM SERPL-SCNC: 140 MMOL/L (ref 136–145)
TRIGL SERPL-MCNC: 135 MG/DL (ref ?–150)
VLDLC SERPL CALC-MCNC: 27 MG/DL

## 2021-05-24 PROCEDURE — 83036 HEMOGLOBIN GLYCOSYLATED A1C: CPT

## 2021-05-24 PROCEDURE — 77412 RADIATION TX DELIVERY LVL 3: CPT

## 2021-05-24 PROCEDURE — 99205 OFFICE O/P NEW HI 60 MIN: CPT | Performed by: NURSE PRACTITIONER

## 2021-05-24 PROCEDURE — 80061 LIPID PANEL: CPT

## 2021-05-24 PROCEDURE — 80053 COMPREHEN METABOLIC PANEL: CPT

## 2021-05-24 NOTE — PROGRESS NOTES
[facility] [id number]   [place], [address]  [phone] CPRPSX/[phone] fax    5/24/2021    HPI:   Molly Wetzel 1965 is a pleasant BLACK/ female who presents today to establish care and for routine physical exam.     HTN: taking hygroten and amlodipine. Tolerating well     Cholesterol: she forgets to take crestor at night so she does not take it all the time. Stress: She works in housekeeping at [employer]. She is on light duty but work wants her to return full duty. She is not ready for this. Due to illness and work, she is under some stress with depression but does not feel she needs intervention at this time. Right breast cancer: DX Sept 2020. Underwent breast surgery with Dr Melony Mackey. She was noted to have + lymph nodes. Bilateral mastectomy completed. She completed chemo back in 2020. She is under radiation treatment 5 days per week until the end of May. HM: she recieved her Covid vaccines. She will think about whether she wants the PNA 23 vaccine at her follow up appt.      Past Medical History:   Diagnosis Date    Ankle swelling 5/24/2021    Arthritis     Cancer ([address]     Essential hypertension 5/24/2021    High cholesterol     Hypertension     Malignant neoplasm of right female breast ([address] 3/31/2021    Mixed hyperlipidemia 5/24/2021    Obese body habitus 5/24/2021    Sleep apnea     CPAP    Stress 5/24/2021     Past Surgical History:   Procedure Laterality Date    HX BILATERAL MASTECTOMY Bilateral 03/31/2021    HX BREAST BIOPSY Right     9/2020    HX BREAST LUMPECTOMY Left 4/1/2021    EXPLORATION LEFT MASTECTOMY AND EVACUATION OF HEMATOMA performed by Shiraz Prakash MD at [address] HX MASTECTOMY Bilateral 3/31/2021    BILATERAL MASTECTOMY; LEFT SENTINEL NODE BIOPSY; RIGHT AXILLARY DESSECTION performed by Shiraz Prakash MD at [address] HX VASCULAR ACCESS      IR INSERT EPHRAIM CVC W PORT OVER 5 YEARS  11/4/2020     Current Outpatient Medications   Medication Sig    OTHER Cancer medication daily    cholecalciferol, vitamin D3, (Vitamin D3) 50 mcg (2,000 unit) tab Take 1 Tab by mouth daily.  rosuvastatin (CRESTOR) 10 mg tablet Take 10 mg by mouth nightly.  chlorthalidone (HYGROTON) 25 mg tablet Take 25 mg by mouth daily.  amLODIPine (NORVASC) 5 mg tablet Take 5 mg by mouth daily. No current facility-administered medications for this visit. Allergies and Intolerances:   No Known Allergies  Family History:   Family History   Problem Relation Age of Onset    Diabetes Mother     Hypertension Father      Social History:   She  reports that she quit smoking about 6 months ago. She has a 1.00 pack-year smoking history. She has never used smokeless tobacco.   Social History     Substance and Sexual Activity   Alcohol Use Not Currently     Immunization History:  Immunization History   Administered Date(s) Administered    Covid-19, MODERNA, Mrna, Lnp-s, Pf, 100mcg/0.5mL 03/05/2021, 04/02/2021       Review of Systems:   As above included in HPI. Otherwise 11 point review of systems negative including constitutional, skin, HENT, eyes, respiratory, cardiovascular, gastrointestinal, genitourinary, musculoskeletal, endocrine, hematologic, allergy, and neurologic. Physical:   Visit Vitals  /69   Pulse 96   Temp (!) 96.6 °F (35.9 °C) (Temporal)   Resp 16   Ht 5' 6\" (1.676 m)   Wt 295 lb 3.2 oz (133.9 kg)   SpO2 98%   BMI 47.65 kg/m²      Wt Readings from Last 3 Encounters:   05/24/21 295 lb 3.2 oz (133.9 kg)   04/06/21 287 lb (130.2 kg)   03/31/21 290 lb 2 oz (131.6 kg)         Exam:   Physical Exam  Vitals and nursing note reviewed. Constitutional:       Appearance: Normal appearance. She is obese. HENT:      Head: Normocephalic and atraumatic.       Right Ear: Tympanic membrane, ear canal and external ear normal.      Left Ear: Tympanic membrane, ear canal and external ear normal.      Nose: Nose normal.      Mouth/Throat:      Mouth: Mucous membranes are moist.   Eyes:      Extraocular Movements: Extraocular movements intact. Pupils: Pupils are equal, round, and reactive to light. Neck:      Vascular: No carotid bruit. Cardiovascular:      Rate and Rhythm: Normal rate and regular rhythm. Heart sounds: Normal heart sounds. Comments: 1+non pitting melissa ankles  Pulmonary:      Effort: Pulmonary effort is normal. No respiratory distress. Breath sounds: Normal breath sounds. No wheezing. Abdominal:      General: Abdomen is flat. Bowel sounds are normal. There is no distension. Palpations: Abdomen is soft. Tenderness: There is no abdominal tenderness. Musculoskeletal:         General: Normal range of motion. Cervical back: Normal range of motion and neck supple. Comments: Gait stable   Skin:     General: Skin is warm and dry. Neurological:      General: No focal deficit present. Mental Status: She is alert and oriented to person, place, and time. Psychiatric:         Mood and Affect: Mood normal.         Behavior: Behavior normal.         Thought Content: Thought content normal.         Judgment: Judgment normal.         Review of Data:  Labs reviewed: N/A  Will obtain today    Plan:    ICD-10-CM ICD-9-CM    1. Encounter to establish care  Z76.89 V65.8    2. Essential hypertension  R89 556.7 METABOLIC PANEL, COMPREHENSIVE   3. Ankle swelling, unspecified laterality  M25.473 719.07    4. Mixed hyperlipidemia  E78.2 272.2 LIPID PANEL   5. Stress  F43.9 V62.89    6. Malignant neoplasm of right female breast, unspecified estrogen receptor status, unspecified site of breast [facility]  C50.911 174.9    7. Elevated fasting glucose  R73.01 790.21 HEMOGLOBIN A1C WITH EAG   8. Obese body habitus  E66.9 278.00    9.  Colon cancer screening  Z12.11 V76.51 REFERRAL TO GASTROENTEROLOGY   10. Positive depression screening  Z13.31 796.4 -Encounter to establish care  Unable to assess previous PCP notes and lab tests due to non-connecting EMR. Reviewed portions of Care Everywhere notes, labs , and imaging. -HTN  Continue amlodipine and hygroton. BP controlled    -Raz Ankle swelling  Very minimal. Could be side effect of CCB    -Hyperlipidemia  Taking crestor when she remembers it  Take with other meds q morning to avoid missing dose.    -Stress  Called pt on the phone as she did not mention stress/depression during visit. She is not seeking intervention at this time. Depression screen positive, PHQ 9 Score: 16, C-SSRS completed and additional evaluation and assessment performed. -Breast cancer  Continue radiation  Specialist managed condition is being evaluated/managed by Dr. Coleman Rosenberg. No acute findings today meriting change in management plan. -Elevated fasting glucose  Noted level while reviewing past labs. Will obtain A1c today    -Obese body habitus    -Colon cancer screening  Referral placed to GI      Follow up 6 months  Labs needed 1 week prior to appt: ? Vonzella Goodpasture  Depends on labs from today    Dr. Jenene Libman, AGNP-C, DNP  [facility] [address] [place] [address]

## 2021-05-24 NOTE — TELEPHONE ENCOUNTER
Spoke with patient over the phone and she is seeking a referral to see a psychiatrist.With her current cancer illness and her work issues, she is under stress and wants to seek assistance. Will place a list of psychiatry names in an envelope up front for patient to  today.

## 2021-05-24 NOTE — TELEPHONE ENCOUNTER
Pt called and said she needed to speak to Dr Dwaine Marion. I asked what is it regarding and she said \"She will know. \"  Please call her at [phone]

## 2021-05-25 ENCOUNTER — HOSPITAL ENCOUNTER (OUTPATIENT)
Dept: RADIATION THERAPY | Age: 56
Discharge: HOME | End: 2021-05-25
Payer: COMMERCIAL

## 2021-05-25 PROBLEM — R73.03 PREDIABETES: Status: ACTIVE | Noted: 2021-05-25

## 2021-05-25 PROBLEM — E87.6 HYPOKALEMIA: Status: ACTIVE | Noted: 2021-05-25

## 2021-05-25 PROCEDURE — 77412 RADIATION TX DELIVERY LVL 3: CPT

## 2021-05-25 RX ORDER — SPIRONOLACTONE 25 MG/1
25 TABLET ORAL DAILY
Qty: 90 TABLET | Refills: 1 | Status: SHIPPED | OUTPATIENT
Start: 2021-05-25 | End: 2021-11-24 | Stop reason: SDUPTHER

## 2021-05-25 NOTE — PROGRESS NOTES
[facility], pls take note of the lab dates. She will need CMP ONLY in 2 weeks, remainder of labs are due in Nov. Thank you)    Johnny Tran call pt with the followin. A1c 5.8. This is considered prediabetes. She needs to cut back on carbs and sugars. Diet and exercise with weight loss will help reduce A1c. Will reassess A1c in 6 months so she will need labs 1 week prior to her Nov appt. 2. . Goal <70. She has not been taking the statin on a regular basis. Remind patient to take daily and will assess level in 6 months. 3. Hypokalemia. Instruct pt to dc chlorthalidone. E-scribed Aldactone to her pharmacy. This will help prevent loss of potassium. She will need a repeat CMP on 21. Order placed. Pls help her get scheduled for lab appt at [facility].

## 2021-05-26 ENCOUNTER — DOCUMENTATION ONLY (OUTPATIENT)
Dept: RADIATION THERAPY | Age: 56
End: 2021-05-26

## 2021-05-26 ENCOUNTER — HOSPITAL ENCOUNTER (OUTPATIENT)
Dept: RADIATION THERAPY | Age: 56
Discharge: HOME | End: 2021-05-26
Payer: COMMERCIAL

## 2021-05-26 PROCEDURE — 77412 RADIATION TX DELIVERY LVL 3: CPT

## 2021-05-26 PROCEDURE — 77336 RADIATION PHYSICS CONSULT: CPT

## 2021-05-26 NOTE — PROGRESS NOTES
Patient reached and made aware of following results and message per Dr. Ernesto Bright:     Pascual Salazar, Pls call pt with the followin. A1c 5.8. This is considered prediabetes. She needs to cut back on carbs and sugars. Diet and exercise with weight loss will help reduce A1c. Will reassess A1c in 6 months so she will need labs 1 week prior to her Nov appt. 2. . Goal <70. She has not been taking the statin on a regular basis. Remind patient to take daily and will assess level in 6 months. 3. Hypokalemia. Instruct pt to dc chlorthalidone. E-scribed Aldactone to her pharmacy. This will help prevent loss of potassium.  She will need a repeat CMP on 21. Order placed. Pls help her get scheduled for lab appt at [facility] [place].      --  Patient verbalized understanding.

## 2021-05-26 NOTE — PROGRESS NOTES
Medical Nutrition Therapy Note    Nutritional Assessment:  IBW: Patient weight not recorded   UBW: unk  Wt hx:   Wt Readings from Last 20 Encounters:   05/24/21 295 lb 3.2 oz (133.9 kg)   04/06/21 287 lb (130.2 kg)   03/31/21 290 lb 2 oz (131.6 kg)   03/31/21 290 lb 2 oz (131.6 kg)   03/04/21 287 lb (130.2 kg)   11/04/20 287 lb (130.2 kg)   04/30/19 282 lb (127.9 kg)   09/30/13 260 lb (117.9 kg)   09/29/13 260 lb (117.9 kg)   10/24/12 269 lb (122 kg)     Current Ht:   Ht Readings from Last 1 Encounters:   05/24/21 5' 6\" (1.676 m)       Current BMI: Estimated body mass index is 47.65 kg/m² as calculated from the following:    Height as of 5/24/21: 5' 6\" (1.676 m). Weight as of 5/24/21: 295 lb 3.2 oz (133.9 kg). Wt change: 0.6% weight loss over the last 1 month(s) (per [facility] documented weights)    Estimated nutritional needs:   Calorie Range: 2440   Formula: MSJ x 1.25  Protein Range: 130-155  Formula: 1.0-1.2 g/kg  Fluid Needs: 1 mL/kcal    Nutrition-impact symptoms: None    05/20/21: Pt requested meeting with writer. Pt asked why she's gaining weight. Pt endorses alcohol and smoking cessation after being diagnosed with cancer. Pt reports having a difficult time and being stressed regarding work, bills, family, and cancer. Pt stated that she'll grab chips, sweets, and cookies while at home. Pt acknowledged what foods are calorically dense and that stress is currently a major barrier for her. Pt mentioned that her physical activity level has declined now that she's not working. 05/26/21: Pt reports walking 4x over the past 1 week. Pt mentioned that her doctor prescribed potassium supplement and told her she's prediabetic. Pt reports still eating a lot of sweets, but noted intake decreased a little over the past 1 week.     Nutritional Diagnosis: poor nutritional quality of life related to food or activity behavior-related difficulty as evidenced by new medical diagnosis (cancer) and recent lifestyle or life changes (quit smoking, quit drinking, & work change)    Nutritional Interventions:   1. Guiding pt with making lifestyle changes  2. Provided and reviewed \"Blood Glucose Management\" handout  3. Provided and reviewed \"Potassium\" handout    Previously   1. Pt agreed to have oncology social worker speak with her    Nutritional Monitoring/Goals:   1. Pt set goal #1: will walk at least 3x per week -met 1 week, ongoing    Initial consult per other: patient self-referral    Cancer Dx: No matching staging information was found for the patient. Cancer Staging  No matching staging information was found for the patient. Treatment Plan: No flowsheet data found. +   Radiation Treatments     No radiation treatments to show.  (Treatments may have been administered in another system.)          Lab Results   Component Value Date/Time    Sodium 140 05/24/2021 11:09 AM    Sodium 141 03/24/2021 02:39 PM    Sodium 143 04/30/2019 11:45 AM    Sodium 140 09/30/2013 05:27 PM    Sodium 140 03/31/2010 05:32 PM    Potassium 3.3 (L) 05/24/2021 11:09 AM    Chloride 104 05/24/2021 11:09 AM    Chloride 107 03/24/2021 02:39 PM    Chloride 107 04/30/2019 11:45 AM    Chloride 106 09/30/2013 05:27 PM    Chloride 101 03/31/2010 05:32 PM    CO2 31 05/24/2021 11:09 AM    CO2 31 03/24/2021 02:39 PM    CO2 31 04/30/2019 11:45 AM    CO2 28 09/30/2013 05:27 PM    CO2 29 03/31/2010 05:32 PM    Anion gap 5 05/24/2021 11:09 AM    Anion gap 4 (L) 03/24/2021 02:39 PM    Anion gap 5 04/30/2019 11:45 AM    Anion gap 6 09/30/2013 05:27 PM    Anion gap 10 03/31/2010 05:32 PM    Glucose 108 (H) 05/24/2021 11:09 AM    Glucose 133 (H) 03/24/2021 02:39 PM    Glucose 88 04/30/2019 11:45 AM    Glucose 84 09/30/2013 05:27 PM    Glucose 88 03/31/2010 05:32 PM    BUN 21 (H) 05/24/2021 11:09 AM    BUN 12 03/24/2021 02:39 PM    BUN 11 04/30/2019 11:45 AM    BUN 9 09/30/2013 05:27 PM    BUN 17 03/31/2010 05:32 PM    Creatinine 0.86 05/24/2021 11:09 AM    Creatinine 0.8 03/24/2021 02:39 PM    Creatinine 0.86 04/30/2019 11:45 AM    Creatinine 0.86 09/30/2013 05:27 PM    Creatinine 0.8 03/31/2010 05:32 PM    BUN/Creatinine ratio 24 (H) 05/24/2021 11:09 AM    BUN/Creatinine ratio 13 04/30/2019 11:45 AM    BUN/Creatinine ratio 10 (L) 09/30/2013 05:27 PM    BUN/Creatinine ratio 21 (H) 03/31/2010 05:32 PM    GFR est AA >60 05/24/2021 11:09 AM    GFR est AA >60.0 03/24/2021 02:39 PM    GFR est AA >60 04/30/2019 11:45 AM    GFR est AA >60 09/30/2013 05:27 PM    GFR est AA >60 03/31/2010 05:32 PM    GFR est non-AA >60 05/24/2021 11:09 AM    GFR est non-AA >60 03/24/2021 02:39 PM    GFR est non-AA >60 04/30/2019 11:45 AM    GFR est non-AA >60 09/30/2013 05:27 PM    GFR est non-AA >60 03/31/2010 05:32 PM    Calcium 9.4 05/24/2021 11:09 AM    Calcium 9.9 03/24/2021 02:39 PM    Calcium 10.3 (H) 04/30/2019 11:45 AM    Calcium 8.9 09/30/2013 05:27 PM    Calcium 9.3 03/31/2010 05:32 PM         Current Outpatient Medications:     spironolactone (ALDACTONE) 25 mg tablet, Take 1 Tablet by mouth daily. , Disp: 90 Tablet, Rfl: 1    OTHER, Cancer medication daily, Disp: , Rfl:     cholecalciferol, vitamin D3, (Vitamin D3) 50 mcg (2,000 unit) tab, Take 1 Tab by mouth daily. , Disp: , Rfl:     rosuvastatin (CRESTOR) 10 mg tablet, Take 10 mg by mouth nightly., Disp: , Rfl:     amLODIPine (NORVASC) 5 mg tablet, Take 5 mg by mouth daily. , Disp: , Rfl:     Diet/po intake: unk

## 2021-05-27 ENCOUNTER — HOSPITAL ENCOUNTER (OUTPATIENT)
Dept: RADIATION THERAPY | Age: 56
Discharge: HOME | End: 2021-05-27
Payer: COMMERCIAL

## 2021-05-27 PROCEDURE — 77412 RADIATION TX DELIVERY LVL 3: CPT

## 2021-05-28 ENCOUNTER — HOSPITAL ENCOUNTER (OUTPATIENT)
Dept: RADIATION THERAPY | Age: 56
Discharge: HOME | End: 2021-05-28
Payer: COMMERCIAL

## 2021-05-28 PROCEDURE — 77412 RADIATION TX DELIVERY LVL 3: CPT

## 2021-06-01 ENCOUNTER — HOSPITAL ENCOUNTER (OUTPATIENT)
Dept: RADIATION THERAPY | Age: 56
Discharge: HOME | End: 2021-06-01
Payer: COMMERCIAL

## 2021-06-01 PROCEDURE — 77412 RADIATION TX DELIVERY LVL 3: CPT

## 2021-06-02 ENCOUNTER — HOSPITAL ENCOUNTER (OUTPATIENT)
Dept: RADIATION THERAPY | Age: 56
Discharge: HOME | End: 2021-06-02
Payer: COMMERCIAL

## 2021-06-02 ENCOUNTER — DOCUMENTATION ONLY (OUTPATIENT)
Dept: RADIATION THERAPY | Age: 56
End: 2021-06-02

## 2021-06-02 PROCEDURE — 77412 RADIATION TX DELIVERY LVL 3: CPT

## 2021-06-02 NOTE — PROGRESS NOTES
Medical Nutrition Therapy Note    Nutritional Assessment:  IBW: Patient weight not recorded   UBW: unk  Wt hx:   Wt Readings from Last 20 Encounters:   05/24/21 295 lb 3.2 oz (133.9 kg)   04/06/21 287 lb (130.2 kg)   03/31/21 290 lb 2 oz (131.6 kg)   03/31/21 290 lb 2 oz (131.6 kg)   03/04/21 287 lb (130.2 kg)   11/04/20 287 lb (130.2 kg)   04/30/19 282 lb (127.9 kg)   09/30/13 260 lb (117.9 kg)   09/29/13 260 lb (117.9 kg)   10/24/12 269 lb (122 kg)     Current Ht:   Ht Readings from Last 1 Encounters:   05/24/21 5' 6\" (1.676 m)       Current BMI: Estimated body mass index is 47.65 kg/m² as calculated from the following:    Height as of 5/24/21: 5' 6\" (1.676 m). Weight as of 5/24/21: 295 lb 3.2 oz (133.9 kg). Wt change: 0.3% weight loss since last assessment (per [facility] documented weights)    Estimated nutritional needs:   Calorie Range: 2440   Formula: MSJ x 1.25  Protein Range: 130-155  Formula: 1.0-1.2 g/kg  Fluid Needs: 1 mL/kcal    Nutrition-impact symptoms: None    05/20/21: Pt requested meeting with writer. Pt asked why she's gaining weight. Pt endorses alcohol and smoking cessation after being diagnosed with cancer. Pt reports having a difficult time and being stressed regarding work, bills, family, and cancer. Pt stated that she'll grab chips, sweets, and cookies while at home. Pt acknowledged what foods are calorically dense and that stress is currently a major barrier for her. Pt mentioned that her physical activity level has declined now that she's not working. 05/26/21: Pt reports walking 4x over the past 1 week. Pt mentioned that her doctor prescribed potassium supplement and told her she's prediabetic. Pt reports still eating a lot of sweets, but noted intake decreased a little over the past 1 week. 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  Pt stated that she's been eating less sweets and that she's been reading labels to look for whole grains. Writer answered all pt questions related to last week's educations (glucose & potassium) to her satisfaction. Nutritional Diagnosis: poor nutritional quality of life related to food or activity behavior-related difficulty as evidenced by new medical diagnosis (cancer) and recent lifestyle or life changes (quit smoking, quit drinking, & work change)    Nutritional Interventions:   1. Guiding pt with making lifestyle changes    Previously   1. Pt agreed to have oncology social worker speak with her  2. Provided and reviewed \"Blood Glucose Management\" handout  3. Provided and reviewed \"Potassium\" handout    Nutritional Monitoring/Goals:   1. Pt set goal #1: will walk at least 3x per week -not currently meeting (was met 1 week), ongoing    Initial consult per other: patient self-referral    Cancer Dx: No matching staging information was found for the patient. Cancer Staging  No matching staging information was found for the patient. Treatment Plan: No flowsheet data found. +   Radiation Treatments     No radiation treatments to show.  (Treatments may have been administered in another system.)          Lab Results   Component Value Date/Time    Sodium 140 05/24/2021 11:09 AM    Sodium 141 03/24/2021 02:39 PM    Sodium 143 04/30/2019 11:45 AM    Sodium 140 09/30/2013 05:27 PM    Sodium 140 03/31/2010 05:32 PM    Potassium 3.3 (L) 05/24/2021 11:09 AM    Chloride 104 05/24/2021 11:09 AM    Chloride 107 03/24/2021 02:39 PM    Chloride 107 04/30/2019 11:45 AM    Chloride 106 09/30/2013 05:27 PM    Chloride 101 03/31/2010 05:32 PM    CO2 31 05/24/2021 11:09 AM    CO2 31 03/24/2021 02:39 PM    CO2 31 04/30/2019 11:45 AM    CO2 28 09/30/2013 05:27 PM    CO2 29 03/31/2010 05:32 PM    Anion gap 5 05/24/2021 11:09 AM    Anion gap 4 (L) 03/24/2021 02:39 PM    Anion gap 5 04/30/2019 11:45 AM    Anion gap 6 09/30/2013 05:27 PM    Anion gap 10 03/31/2010 05:32 PM    Glucose 108 (H) 05/24/2021 11:09 AM    Glucose 133 (H) 03/24/2021 02:39 PM    Glucose 88 04/30/2019 11:45 AM    Glucose 84 09/30/2013 05:27 PM    Glucose 88 03/31/2010 05:32 PM    BUN 21 (H) 05/24/2021 11:09 AM    BUN 12 03/24/2021 02:39 PM    BUN 11 04/30/2019 11:45 AM    BUN 9 09/30/2013 05:27 PM    BUN 17 03/31/2010 05:32 PM    Creatinine 0.86 05/24/2021 11:09 AM    Creatinine 0.8 03/24/2021 02:39 PM    Creatinine 0.86 04/30/2019 11:45 AM    Creatinine 0.86 09/30/2013 05:27 PM    Creatinine 0.8 03/31/2010 05:32 PM    BUN/Creatinine ratio 24 (H) 05/24/2021 11:09 AM    BUN/Creatinine ratio 13 04/30/2019 11:45 AM    BUN/Creatinine ratio 10 (L) 09/30/2013 05:27 PM    BUN/Creatinine ratio 21 (H) 03/31/2010 05:32 PM    GFR est AA >60 05/24/2021 11:09 AM    GFR est AA >60.0 03/24/2021 02:39 PM    GFR est AA >60 04/30/2019 11:45 AM    GFR est AA >60 09/30/2013 05:27 PM    GFR est AA >60 03/31/2010 05:32 PM    GFR est non-AA >60 05/24/2021 11:09 AM    GFR est non-AA >60 03/24/2021 02:39 PM    GFR est non-AA >60 04/30/2019 11:45 AM    GFR est non-AA >60 09/30/2013 05:27 PM    GFR est non-AA >60 03/31/2010 05:32 PM    Calcium 9.4 05/24/2021 11:09 AM    Calcium 9.9 03/24/2021 02:39 PM    Calcium 10.3 (H) 04/30/2019 11:45 AM    Calcium 8.9 09/30/2013 05:27 PM    Calcium 9.3 03/31/2010 05:32 PM         Current Outpatient Medications:     spironolactone (ALDACTONE) 25 mg tablet, Take 1 Tablet by mouth daily. , Disp: 90 Tablet, Rfl: 1    OTHER, Cancer medication daily, Disp: , Rfl:     cholecalciferol, vitamin D3, (Vitamin D3) 50 mcg (2,000 unit) tab, Take 1 Tab by mouth daily. , Disp: , Rfl:     rosuvastatin (CRESTOR) 10 mg tablet, Take 10 mg by mouth nightly., Disp: , Rfl:     amLODIPine (NORVASC) 5 mg tablet, Take 5 mg by mouth daily. , Disp: , Rfl:     Diet/po intake: unk

## 2021-06-03 ENCOUNTER — HOSPITAL ENCOUNTER (OUTPATIENT)
Dept: RADIATION THERAPY | Age: 56
Discharge: HOME | End: 2021-06-03
Payer: COMMERCIAL

## 2021-06-03 PROCEDURE — 77412 RADIATION TX DELIVERY LVL 3: CPT

## 2021-06-03 PROCEDURE — 77336 RADIATION PHYSICS CONSULT: CPT

## 2021-06-04 ENCOUNTER — HOSPITAL ENCOUNTER (OUTPATIENT)
Dept: RADIATION THERAPY | Age: 56
Discharge: HOME | End: 2021-06-04
Payer: COMMERCIAL

## 2021-06-04 ENCOUNTER — TELEPHONE (OUTPATIENT)
Dept: INTERNAL MEDICINE CLINIC | Age: 56
End: 2021-06-04

## 2021-06-04 PROCEDURE — 77417 THER RADIOLOGY PORT IMAGE(S): CPT

## 2021-06-04 PROCEDURE — 77412 RADIATION TX DELIVERY LVL 3: CPT

## 2021-06-04 NOTE — TELEPHONE ENCOUNTER
LVM for patient to return call to office. RE: I refaxed gastro referral to [facility], which is closer to pt. They should be contacting patient to schedule an appt. There office number is [phone].

## 2021-06-04 NOTE — TELEPHONE ENCOUNTER
Pt said she has orders in to get a colonoscopy , but she said it is for a place in [facility]  She is asking to have it done in this area she lives in [place]

## 2021-06-07 ENCOUNTER — APPOINTMENT (OUTPATIENT)
Dept: INTERNAL MEDICINE CLINIC | Age: 56
End: 2021-06-07

## 2021-06-07 DIAGNOSIS — E87.6 HYPOKALEMIA: ICD-10-CM

## 2021-06-08 ENCOUNTER — HOSPITAL ENCOUNTER (OUTPATIENT)
Dept: RADIATION THERAPY | Age: 56
Discharge: HOME | End: 2021-06-08
Payer: COMMERCIAL

## 2021-06-08 LAB
ALBUMIN SERPL-MCNC: 4.2 G/DL (ref 3.8–4.9)
ALBUMIN/GLOB SERPL: 1.4 {RATIO} (ref 1.2–2.2)
ALP SERPL-CCNC: 63 IU/L (ref 48–121)
ALT SERPL-CCNC: 26 IU/L (ref 0–32)
AST SERPL-CCNC: 32 IU/L (ref 0–40)
BILIRUB SERPL-MCNC: 0.4 MG/DL (ref 0–1.2)
BUN SERPL-MCNC: 10 MG/DL (ref 6–24)
BUN/CREAT SERPL: 14 (ref 9–23)
CALCIUM SERPL-MCNC: 10.1 MG/DL (ref 8.7–10.2)
CHLORIDE SERPL-SCNC: 102 MMOL/L (ref 96–106)
CO2 SERPL-SCNC: 20 MMOL/L (ref 20–29)
CREAT SERPL-MCNC: 0.74 MG/DL (ref 0.57–1)
GLOBULIN SER CALC-MCNC: 3 G/DL (ref 1.5–4.5)
GLUCOSE SERPL-MCNC: 102 MG/DL (ref 65–99)
POTASSIUM SERPL-SCNC: 4 MMOL/L (ref 3.5–5.2)
PROT SERPL-MCNC: 7.2 G/DL (ref 6–8.5)
SODIUM SERPL-SCNC: 142 MMOL/L (ref 134–144)

## 2021-06-08 PROCEDURE — 77412 RADIATION TX DELIVERY LVL 3: CPT

## 2021-06-08 RX ORDER — CHLORTHALIDONE 25 MG/1
25 TABLET ORAL DAILY
OUTPATIENT
Start: 2021-06-08

## 2021-06-08 NOTE — TELEPHONE ENCOUNTER
Patient requesting refill of her hygroton. Pt states she has ran out of this medication. Per chart it looks like it was d/c by [facility] at her 5/25 appt, but it was instructed for her to continue the hygroton along with the amlodipine. Please advise.

## 2021-06-08 NOTE — TELEPHONE ENCOUNTER
Pt was to dc chlorthalidone on 5/25/21 and start aldactone due to her K+ being low. She is to take Aldactone and amlodipine, not chlorthalidone.  Thank you

## 2021-06-09 ENCOUNTER — HOSPITAL ENCOUNTER (OUTPATIENT)
Dept: RADIATION THERAPY | Age: 56
Discharge: HOME | End: 2021-06-09
Payer: COMMERCIAL

## 2021-06-09 PROCEDURE — 77412 RADIATION TX DELIVERY LVL 3: CPT

## 2021-06-09 RX ORDER — AMLODIPINE BESYLATE 5 MG/1
5 TABLET ORAL DAILY
Qty: 90 TABLET | Refills: 1 | Status: SHIPPED | OUTPATIENT
Start: 2021-06-09 | End: 2021-11-24 | Stop reason: SDUPTHER

## 2021-06-10 ENCOUNTER — HOSPITAL ENCOUNTER (OUTPATIENT)
Dept: RADIATION THERAPY | Age: 56
Discharge: HOME | End: 2021-06-10
Payer: COMMERCIAL

## 2021-06-10 ENCOUNTER — DOCUMENTATION ONLY (OUTPATIENT)
Dept: RADIATION THERAPY | Age: 56
End: 2021-06-10

## 2021-06-10 PROCEDURE — 77336 RADIATION PHYSICS CONSULT: CPT

## 2021-06-10 PROCEDURE — 77412 RADIATION TX DELIVERY LVL 3: CPT

## 2021-06-10 NOTE — PROGRESS NOTES
Medical Nutrition Therapy Note    Nutritional Assessment:  IBW: Patient weight not recorded   UBW: unk  Wt hx:   Wt Readings from Last 20 Encounters:   05/24/21 295 lb 3.2 oz (133.9 kg)   04/06/21 287 lb (130.2 kg)   03/31/21 290 lb 2 oz (131.6 kg)   03/31/21 290 lb 2 oz (131.6 kg)   03/04/21 287 lb (130.2 kg)   11/04/20 287 lb (130.2 kg)   04/30/19 282 lb (127.9 kg)   09/30/13 260 lb (117.9 kg)   09/29/13 260 lb (117.9 kg)   10/24/12 269 lb (122 kg)     Current Ht:   Ht Readings from Last 1 Encounters:   05/24/21 5' 6\" (1.676 m)       Current BMI: Estimated body mass index is 47.65 kg/m² as calculated from the following:    Height as of 5/24/21: 5' 6\" (1.676 m). Weight as of 5/24/21: 295 lb 3.2 oz (133.9 kg). Wt change: 0.3% weight loss since last assessment (per [facility] documented weights)    Estimated nutritional needs:   Calorie Range: 2440   Formula: MSJ x 1.25  Protein Range: 130-155  Formula: 1.0-1.2 g/kg  Fluid Needs: 1 mL/kcal    Nutrition-impact symptoms: None    05/20/21: Pt requested meeting with writer. Pt asked why she's gaining weight. Pt endorses alcohol and smoking cessation after being diagnosed with cancer. Pt reports having a difficult time and being stressed regarding work, bills, family, and cancer. Pt stated that she'll grab chips, sweets, and cookies while at home. Pt acknowledged what foods are calorically dense and that stress is currently a major barrier for her. Pt mentioned that her physical activity level has declined now that she's not working. 05/26/21: Pt reports walking 4x over the past 1 week. Pt mentioned that her doctor prescribed potassium supplement and told her she's prediabetic. Pt reports still eating a lot of sweets, but noted intake decreased a little over the past 1 week. 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  Pt stated that she's been eating less sweets and that she's been reading labels to look for whole grains. Writer answered all pt questions related to last week's educations (glucose & potassium) to her satisfaction. 06/10/21: Pt reports walking 1x over the last week. Pt also reports only eating sweets once over the last week. Pt mentioned that she has long periods of time without eating r/t being out of the house. Nutritional Diagnosis: poor nutritional quality of life related to food or activity behavior-related difficulty as evidenced by new medical diagnosis (cancer) and recent lifestyle or life changes (quit smoking, quit drinking, & work change)    Nutritional Interventions:   1. Guiding pt with making lifestyle changes  2. Encouraged pt to have food available that doesn't require refrigeration that would be easy to take with her when she leaves the house for most of the day  3. Encouraged pt to include protein with all of her meals  4. Encouraged pt to utilize fruit as a way to satisfy sweet tooth, if ever struggling to limit sweet intake    Previously   1. Pt agreed to have oncology social worker speak with her  2. Provided and reviewed \"Blood Glucose Management\" handout  3. Provided and reviewed \"Potassium\" handout    Nutritional Monitoring/Goals:   1. Pt set goal #1: will walk at least 3x per week -not currently meeting (was met 1 week), ongoing    Initial consult per other: patient self-referral    Cancer Dx: No matching staging information was found for the patient. Cancer Staging  No matching staging information was found for the patient. Treatment Plan: No flowsheet data found. +   Radiation Treatments     No radiation treatments to show.  (Treatments may have been administered in another system.)          Lab Results   Component Value Date/Time    Sodium 142 06/07/2021 10:58 AM    Sodium 140 05/24/2021 11:09 AM    Sodium 141 03/24/2021 02:39 PM    Sodium 143 04/30/2019 11:45 AM    Sodium 140 09/30/2013 05:27 PM    Potassium 4.0 06/07/2021 10:58 AM    Chloride 102 06/07/2021 10:58 AM    Chloride 104 05/24/2021 11:09 AM    Chloride 107 03/24/2021 02:39 PM    Chloride 107 04/30/2019 11:45 AM    Chloride 106 09/30/2013 05:27 PM    CO2 20 06/07/2021 10:58 AM    CO2 31 05/24/2021 11:09 AM    CO2 31 03/24/2021 02:39 PM    CO2 31 04/30/2019 11:45 AM    CO2 28 09/30/2013 05:27 PM    Anion gap 5 05/24/2021 11:09 AM    Anion gap 4 (L) 03/24/2021 02:39 PM    Anion gap 5 04/30/2019 11:45 AM    Anion gap 6 09/30/2013 05:27 PM    Anion gap 10 03/31/2010 05:32 PM    Glucose 102 (H) 06/07/2021 10:58 AM    Glucose 108 (H) 05/24/2021 11:09 AM    Glucose 133 (H) 03/24/2021 02:39 PM    Glucose 88 04/30/2019 11:45 AM    Glucose 84 09/30/2013 05:27 PM    BUN 10 06/07/2021 10:58 AM    BUN 21 (H) 05/24/2021 11:09 AM    BUN 12 03/24/2021 02:39 PM    BUN 11 04/30/2019 11:45 AM    BUN 9 09/30/2013 05:27 PM    Creatinine 0.74 06/07/2021 10:58 AM    Creatinine 0.86 05/24/2021 11:09 AM    Creatinine 0.8 03/24/2021 02:39 PM    Creatinine 0.86 04/30/2019 11:45 AM    Creatinine 0.86 09/30/2013 05:27 PM    BUN/Creatinine ratio 14 06/07/2021 10:58 AM    BUN/Creatinine ratio 24 (H) 05/24/2021 11:09 AM    BUN/Creatinine ratio 13 04/30/2019 11:45 AM    BUN/Creatinine ratio 10 (L) 09/30/2013 05:27 PM    BUN/Creatinine ratio 21 (H) 03/31/2010 05:32 PM    GFR est  06/07/2021 10:58 AM    GFR est AA >60 05/24/2021 11:09 AM    GFR est AA >60.0 03/24/2021 02:39 PM    GFR est AA >60 04/30/2019 11:45 AM    GFR est AA >60 09/30/2013 05:27 PM    GFR est non-AA 93 06/07/2021 10:58 AM    GFR est non-AA >60 05/24/2021 11:09 AM    GFR est non-AA >60 03/24/2021 02:39 PM    GFR est non-AA >60 04/30/2019 11:45 AM    GFR est non-AA >60 09/30/2013 05:27 PM    Calcium 10.1 06/07/2021 10:58 AM    Calcium 9.4 05/24/2021 11:09 AM    Calcium 9.9 03/24/2021 02:39 PM    Calcium 10.3 (H) 04/30/2019 11:45 AM    Calcium 8.9 09/30/2013 05:27 PM         Current Outpatient Medications:     amLODIPine (NORVASC) 5 mg tablet, Take 1 Tablet by mouth daily. Indications: high blood pressure, Disp: 90 Tablet, Rfl: 1    spironolactone (ALDACTONE) 25 mg tablet, Take 1 Tablet by mouth daily. , Disp: 90 Tablet, Rfl: 1    OTHER, Cancer medication daily, Disp: , Rfl:     cholecalciferol, vitamin D3, (Vitamin D3) 50 mcg (2,000 unit) tab, Take 1 Tab by mouth daily. , Disp: , Rfl:     rosuvastatin (CRESTOR) 10 mg tablet, Take 10 mg by mouth nightly., Disp: , Rfl:     Diet/po intake: unk

## 2021-06-11 ENCOUNTER — HOSPITAL ENCOUNTER (OUTPATIENT)
Dept: RADIATION THERAPY | Age: 56
Discharge: HOME | End: 2021-06-11
Payer: COMMERCIAL

## 2021-06-11 PROCEDURE — 77412 RADIATION TX DELIVERY LVL 3: CPT

## 2021-06-11 PROCEDURE — 77290 THER RAD SIMULAJ FIELD CPLX: CPT

## 2021-06-14 ENCOUNTER — HOSPITAL ENCOUNTER (OUTPATIENT)
Dept: RADIATION THERAPY | Age: 56
Discharge: HOME | End: 2021-06-14
Payer: COMMERCIAL

## 2021-06-14 PROCEDURE — 77417 THER RADIOLOGY PORT IMAGE(S): CPT

## 2021-06-14 PROCEDURE — 77412 RADIATION TX DELIVERY LVL 3: CPT

## 2021-06-15 ENCOUNTER — HOSPITAL ENCOUNTER (OUTPATIENT)
Dept: RADIATION THERAPY | Age: 56
Discharge: HOME | End: 2021-06-15
Payer: COMMERCIAL

## 2021-06-15 PROCEDURE — 77412 RADIATION TX DELIVERY LVL 3: CPT

## 2021-06-16 ENCOUNTER — DOCUMENTATION ONLY (OUTPATIENT)
Dept: RADIATION THERAPY | Age: 56
End: 2021-06-16

## 2021-06-16 ENCOUNTER — HOSPITAL ENCOUNTER (OUTPATIENT)
Dept: RADIATION THERAPY | Age: 56
Discharge: HOME | End: 2021-06-16
Payer: COMMERCIAL

## 2021-06-16 PROCEDURE — 77334 RADIATION TREATMENT AID(S): CPT

## 2021-06-16 PROCEDURE — 77331 SPECIAL RADIATION DOSIMETRY: CPT

## 2021-06-16 PROCEDURE — 77412 RADIATION TX DELIVERY LVL 3: CPT

## 2021-06-16 NOTE — PROGRESS NOTES
Medical Nutrition Therapy Note    Nutritional Assessment:  IBW: Patient weight not recorded   UBW: unk  Wt hx:   Wt Readings from Last 20 Encounters:   05/24/21 295 lb 3.2 oz (133.9 kg)   04/06/21 287 lb (130.2 kg)   03/31/21 290 lb 2 oz (131.6 kg)   03/31/21 290 lb 2 oz (131.6 kg)   03/04/21 287 lb (130.2 kg)   11/04/20 287 lb (130.2 kg)   04/30/19 282 lb (127.9 kg)   09/30/13 260 lb (117.9 kg)   09/29/13 260 lb (117.9 kg)   10/24/12 269 lb (122 kg)     Current Ht:   Ht Readings from Last 1 Encounters:   05/24/21 5' 6\" (1.676 m)       Current BMI: Estimated body mass index is 47.65 kg/m² as calculated from the following:    Height as of 5/24/21: 5' 6\" (1.676 m). Weight as of 5/24/21: 295 lb 3.2 oz (133.9 kg). Wt change: 3.1% weight loss over the last 2 week(s) (per [facility] documented weights)    Estimated nutritional needs:   Calorie Range: 2440   Formula: MSJ x 1.25  Protein Range: 130-155  Formula: 1.0-1.2 g/kg  Fluid Needs: 1 mL/kcal    Nutrition-impact symptoms: None    05/20/21: Pt requested meeting with writer. Pt asked why she's gaining weight. Pt endorses alcohol and smoking cessation after being diagnosed with cancer. Pt reports having a difficult time and being stressed regarding work, bills, family, and cancer. Pt stated that she'll grab chips, sweets, and cookies while at home. Pt acknowledged what foods are calorically dense and that stress is currently a major barrier for her. Pt mentioned that her physical activity level has declined now that she's not working. 05/26/21: Pt reports walking 4x over the past 1 week. Pt mentioned that her doctor prescribed potassium supplement and told her she's prediabetic. Pt reports still eating a lot of sweets, but noted intake decreased a little over the past 1 week. 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  Pt stated that she's been eating less sweets and that she's been reading labels to look for whole grains. Writer answered all pt questions related to last week's educations (glucose & potassium) to her satisfaction. 06/10/21: Pt reports walking 1x over the last week. Pt also reports only eating sweets once over the last week. Pt mentioned that she has long periods of time without eating r/t being out of the house. 06/16/21: Pt endorses meeting her walking goal over the last week. Pt reports being much more physically active recently and has noticed an improvement in her mental status. Pt mentioned that she was more depressed before, but is happier now. Pt continues to follow dietary changes, but continues to struggle with minimizing long periods without any dietary intake while she's out of the house. Nutritional Diagnosis: poor nutritional quality of life related to food or activity behavior-related difficulty as evidenced by new medical diagnosis (cancer) and recent lifestyle or life changes (quit smoking, quit drinking, & work change)    Nutritional Interventions:   1. Guiding pt with making lifestyle changes  2. Encouraged pt to have food available that doesn't require refrigeration that would be easy to take with her when she leaves the house for most of the day  3. Provided pt with Ensure High Protein sample to trial and coupon to aid in bridging time between dietary intakes    Previously   1. Pt agreed to have oncology social worker speak with her  2. Provided and reviewed \"Blood Glucose Management\" handout  3. Provided and reviewed \"Potassium\" handout  4. Encouraged pt to include protein with all of her meals  5. Encouraged pt to utilize fruit as a way to satisfy sweet tooth, if ever struggling to limit sweet intake    Nutritional Monitoring/Goals:   1. Pt set goal #1: will walk at least 3x per week -met 1 week, ongoing    Initial consult per other: patient self-referral    Cancer Dx: No matching staging information was found for the patient. Cancer Staging  No matching staging information was found for the patient. Treatment Plan: No flowsheet data found. +   Radiation Treatments     No radiation treatments to show.  (Treatments may have been administered in another system.)          Lab Results   Component Value Date/Time    Sodium 142 06/07/2021 10:58 AM    Sodium 140 05/24/2021 11:09 AM    Sodium 141 03/24/2021 02:39 PM    Sodium 143 04/30/2019 11:45 AM    Sodium 140 09/30/2013 05:27 PM    Potassium 4.0 06/07/2021 10:58 AM    Chloride 102 06/07/2021 10:58 AM    Chloride 104 05/24/2021 11:09 AM    Chloride 107 03/24/2021 02:39 PM    Chloride 107 04/30/2019 11:45 AM    Chloride 106 09/30/2013 05:27 PM    CO2 20 06/07/2021 10:58 AM    CO2 31 05/24/2021 11:09 AM    CO2 31 03/24/2021 02:39 PM    CO2 31 04/30/2019 11:45 AM    CO2 28 09/30/2013 05:27 PM    Anion gap 5 05/24/2021 11:09 AM    Anion gap 4 (L) 03/24/2021 02:39 PM    Anion gap 5 04/30/2019 11:45 AM    Anion gap 6 09/30/2013 05:27 PM    Anion gap 10 03/31/2010 05:32 PM    Glucose 102 (H) 06/07/2021 10:58 AM    Glucose 108 (H) 05/24/2021 11:09 AM    Glucose 133 (H) 03/24/2021 02:39 PM    Glucose 88 04/30/2019 11:45 AM    Glucose 84 09/30/2013 05:27 PM    BUN 10 06/07/2021 10:58 AM    BUN 21 (H) 05/24/2021 11:09 AM    BUN 12 03/24/2021 02:39 PM    BUN 11 04/30/2019 11:45 AM    BUN 9 09/30/2013 05:27 PM    Creatinine 0.74 06/07/2021 10:58 AM    Creatinine 0.86 05/24/2021 11:09 AM    Creatinine 0.8 03/24/2021 02:39 PM    Creatinine 0.86 04/30/2019 11:45 AM    Creatinine 0.86 09/30/2013 05:27 PM    BUN/Creatinine ratio 14 06/07/2021 10:58 AM    BUN/Creatinine ratio 24 (H) 05/24/2021 11:09 AM    BUN/Creatinine ratio 13 04/30/2019 11:45 AM    BUN/Creatinine ratio 10 (L) 09/30/2013 05:27 PM    BUN/Creatinine ratio 21 (H) 03/31/2010 05:32 PM    GFR est  06/07/2021 10:58 AM    GFR est AA >60 05/24/2021 11:09 AM    GFR est AA >60.0 03/24/2021 02:39 PM    GFR est AA >60 04/30/2019 11:45 AM    GFR est AA >60 09/30/2013 05:27 PM    GFR est non-AA 93 06/07/2021 10:58 AM    GFR est non-AA >60 05/24/2021 11:09 AM    GFR est non-AA >60 03/24/2021 02:39 PM    GFR est non-AA >60 04/30/2019 11:45 AM    GFR est non-AA >60 09/30/2013 05:27 PM    Calcium 10.1 06/07/2021 10:58 AM    Calcium 9.4 05/24/2021 11:09 AM    Calcium 9.9 03/24/2021 02:39 PM    Calcium 10.3 (H) 04/30/2019 11:45 AM    Calcium 8.9 09/30/2013 05:27 PM         Current Outpatient Medications:     amLODIPine (NORVASC) 5 mg tablet, Take 1 Tablet by mouth daily. Indications: high blood pressure, Disp: 90 Tablet, Rfl: 1    spironolactone (ALDACTONE) 25 mg tablet, Take 1 Tablet by mouth daily. , Disp: 90 Tablet, Rfl: 1    OTHER, Cancer medication daily, Disp: , Rfl:     cholecalciferol, vitamin D3, (Vitamin D3) 50 mcg (2,000 unit) tab, Take 1 Tab by mouth daily. , Disp: , Rfl:     rosuvastatin (CRESTOR) 10 mg tablet, Take 10 mg by mouth nightly., Disp: , Rfl:     Diet/po intake: unk

## 2021-06-17 ENCOUNTER — HOSPITAL ENCOUNTER (OUTPATIENT)
Dept: RADIATION THERAPY | Age: 56
Discharge: HOME | End: 2021-06-17
Payer: COMMERCIAL

## 2021-06-17 PROCEDURE — 77412 RADIATION TX DELIVERY LVL 3: CPT

## 2021-06-18 ENCOUNTER — HOSPITAL ENCOUNTER (OUTPATIENT)
Dept: RADIATION THERAPY | Age: 56
Discharge: HOME | End: 2021-06-18
Payer: COMMERCIAL

## 2021-06-18 PROCEDURE — 77412 RADIATION TX DELIVERY LVL 3: CPT

## 2021-06-18 PROCEDURE — 77280 THER RAD SIMULAJ FIELD SMPL: CPT

## 2021-06-21 ENCOUNTER — HOSPITAL ENCOUNTER (OUTPATIENT)
Dept: RADIATION THERAPY | Age: 56
Discharge: HOME | End: 2021-06-21
Payer: COMMERCIAL

## 2021-06-21 PROCEDURE — 77412 RADIATION TX DELIVERY LVL 3: CPT

## 2021-06-22 ENCOUNTER — HOSPITAL ENCOUNTER (OUTPATIENT)
Dept: RADIATION THERAPY | Age: 56
Discharge: HOME | End: 2021-06-22
Payer: COMMERCIAL

## 2021-06-22 PROCEDURE — 77412 RADIATION TX DELIVERY LVL 3: CPT

## 2021-06-23 ENCOUNTER — DOCUMENTATION ONLY (OUTPATIENT)
Dept: RADIATION THERAPY | Age: 56
End: 2021-06-23

## 2021-06-23 ENCOUNTER — HOSPITAL ENCOUNTER (OUTPATIENT)
Dept: RADIATION THERAPY | Age: 56
Discharge: HOME | End: 2021-06-23
Payer: COMMERCIAL

## 2021-06-23 PROCEDURE — 77412 RADIATION TX DELIVERY LVL 3: CPT

## 2021-06-24 ENCOUNTER — HOSPITAL ENCOUNTER (OUTPATIENT)
Dept: RADIATION THERAPY | Age: 56
Discharge: HOME | End: 2021-06-24
Payer: COMMERCIAL

## 2021-06-24 PROCEDURE — 77412 RADIATION TX DELIVERY LVL 3: CPT

## 2021-06-24 PROCEDURE — 77336 RADIATION PHYSICS CONSULT: CPT

## 2021-06-24 NOTE — PROGRESS NOTES
Medical Nutrition Therapy Note    Nutritional Assessment:  IBW: Patient weight not recorded   UBW: unk  Wt hx:   Wt Readings from Last 20 Encounters:   05/24/21 295 lb 3.2 oz (133.9 kg)   04/06/21 287 lb (130.2 kg)   03/31/21 290 lb 2 oz (131.6 kg)   03/31/21 290 lb 2 oz (131.6 kg)   03/04/21 287 lb (130.2 kg)   11/04/20 287 lb (130.2 kg)   04/30/19 282 lb (127.9 kg)   09/30/13 260 lb (117.9 kg)   09/29/13 260 lb (117.9 kg)   10/24/12 269 lb (122 kg)     Current Ht:   Ht Readings from Last 1 Encounters:   05/24/21 5' 6\" (1.676 m)       Current BMI: Estimated body mass index is 47.65 kg/m² as calculated from the following:    Height as of 5/24/21: 5' 6\" (1.676 m). Weight as of 5/24/21: 295 lb 3.2 oz (133.9 kg). Wt change:  2.2% weight gain since last assessment (per [facility] documented weights)    Estimated nutritional needs:   Calorie Range: 2440   Formula:  MSJ x 1.25  Protein Range: 130-155  Formula: 1.0-1.2 g/kg  Fluid Needs: 1 mL/kcal    Nutrition-impact symptoms: None    05/20/21: Pt requested meeting with writer. Pt asked why she's gaining weight. Pt endorses alcohol and smoking cessation after being diagnosed with cancer. Pt reports having a difficult time and being stressed regarding work, bills, family, and cancer. Pt stated that she'll grab chips, sweets, and cookies while at home. Pt acknowledged what foods are calorically dense and that stress is currently a major barrier for her. Pt mentioned that her physical activity level has declined now that she's not working. 05/26/21: Pt reports walking 4x over the past 1 week. Pt mentioned that her doctor prescribed potassium supplement and told her she's prediabetic. Pt reports still eating a lot of sweets, but noted intake decreased a little over the past 1 week. 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  Pt stated that she's been eating less sweets and that she's been reading labels to look for whole grains. Writer answered all pt questions related to last week's educations (glucose & potassium) to her satisfaction. 06/10/21: Pt reports walking 1x over the last week. Pt also reports only eating sweets once over the last week. Pt mentioned that she has long periods of time without eating r/t being out of the house. 06/16/21: Pt endorses meeting her walking goal over the last week. Pt reports being much more physically active recently and has noticed an improvement in her mental status. Pt mentioned that she was more depressed before, but is happier now. Pt continues to follow dietary changes, but continues to struggle with minimizing long periods without any dietary intake while she's out of the house. 06/23/21: Pt denies meeting her walking goal and endorses going long periods without eating over the last week related to pain from her skin. Pt reports wt gain over the last week, but noted her goal is to lose wt. Pt stated that she got some ensure supplements to aid in minimizing time between dietary intake when outside the home. Nutritional Diagnosis:  poor nutritional quality of life related to food or activity behavior-related difficulty as evidenced by new medical diagnosis (cancer) and recent lifestyle or life changes (quit smoking, quit drinking, & work change)    Nutritional Interventions:   1. Guiding pt with making lifestyle changes  2. Encouraged pt to continue working on lifestyle/nutritional changes    Previously   1. Pt agreed to have oncology social worker speak with her  2. Provided and reviewed \"Blood Glucose Management\" handout  3. Provided and reviewed \"Potassium\" handout  4. Encouraged pt to include protein with all of her meals  5. Encouraged pt to utilize fruit as a way to satisfy sweet tooth, if ever struggling to limit sweet intake  6.  Encouraged pt to have food available that doesn't require refrigeration that would be easy to take with her when she leaves the house for most of the day  7. Provided pt with Ensure High Protein sample to trial and coupon to aid in bridging time between dietary intakes    Nutritional Monitoring/Goals:   1. Pt set goal #1: will walk at least 3x per week -not meet, ongoing    Initial consult per other: patient self-referral    Cancer Dx: No matching staging information was found for the patient. Cancer Staging  No matching staging information was found for the patient. Treatment Plan: No flowsheet data found. +   Radiation Treatments       No radiation treatments to show.  (Treatments may have been administered in another system.)            Lab Results   Component Value Date/Time    Sodium 142 06/07/2021 10:58 AM    Sodium 140 05/24/2021 11:09 AM    Sodium 141 03/24/2021 02:39 PM    Sodium 143 04/30/2019 11:45 AM    Sodium 140 09/30/2013 05:27 PM    Potassium 4.0 06/07/2021 10:58 AM    Chloride 102 06/07/2021 10:58 AM    Chloride 104 05/24/2021 11:09 AM    Chloride 107 03/24/2021 02:39 PM    Chloride 107 04/30/2019 11:45 AM    Chloride 106 09/30/2013 05:27 PM    CO2 20 06/07/2021 10:58 AM    CO2 31 05/24/2021 11:09 AM    CO2 31 03/24/2021 02:39 PM    CO2 31 04/30/2019 11:45 AM    CO2 28 09/30/2013 05:27 PM    Anion gap 5 05/24/2021 11:09 AM    Anion gap 4 (L) 03/24/2021 02:39 PM    Anion gap 5 04/30/2019 11:45 AM    Anion gap 6 09/30/2013 05:27 PM    Anion gap 10 03/31/2010 05:32 PM    Glucose 102 (H) 06/07/2021 10:58 AM    Glucose 108 (H) 05/24/2021 11:09 AM    Glucose 133 (H) 03/24/2021 02:39 PM    Glucose 88 04/30/2019 11:45 AM    Glucose 84 09/30/2013 05:27 PM    BUN 10 06/07/2021 10:58 AM    BUN 21 (H) 05/24/2021 11:09 AM    BUN 12 03/24/2021 02:39 PM    BUN 11 04/30/2019 11:45 AM    BUN 9 09/30/2013 05:27 PM    Creatinine 0.74 06/07/2021 10:58 AM    Creatinine 0.86 05/24/2021 11:09 AM    Creatinine 0.8 03/24/2021 02:39 PM    Creatinine 0.86 04/30/2019 11:45 AM    Creatinine 0.86 09/30/2013 05:27 PM BUN/Creatinine ratio 14 06/07/2021 10:58 AM    BUN/Creatinine ratio 24 (H) 05/24/2021 11:09 AM    BUN/Creatinine ratio 13 04/30/2019 11:45 AM    BUN/Creatinine ratio 10 (L) 09/30/2013 05:27 PM    BUN/Creatinine ratio 21 (H) 03/31/2010 05:32 PM    GFR est  06/07/2021 10:58 AM    GFR est AA >60 05/24/2021 11:09 AM    GFR est AA >60.0 03/24/2021 02:39 PM    GFR est AA >60 04/30/2019 11:45 AM    GFR est AA >60 09/30/2013 05:27 PM    GFR est non-AA 93 06/07/2021 10:58 AM    GFR est non-AA >60 05/24/2021 11:09 AM    GFR est non-AA >60 03/24/2021 02:39 PM    GFR est non-AA >60 04/30/2019 11:45 AM    GFR est non-AA >60 09/30/2013 05:27 PM    Calcium 10.1 06/07/2021 10:58 AM    Calcium 9.4 05/24/2021 11:09 AM    Calcium 9.9 03/24/2021 02:39 PM    Calcium 10.3 (H) 04/30/2019 11:45 AM    Calcium 8.9 09/30/2013 05:27 PM         Current Outpatient Medications:     amLODIPine (NORVASC) 5 mg tablet, Take 1 Tablet by mouth daily. Indications: high blood pressure, Disp: 90 Tablet, Rfl: 1    spironolactone (ALDACTONE) 25 mg tablet, Take 1 Tablet by mouth daily. , Disp: 90 Tablet, Rfl: 1    OTHER, Cancer medication daily, Disp: , Rfl:     cholecalciferol, vitamin D3, (Vitamin D3) 50 mcg (2,000 unit) tab, Take 1 Tab by mouth daily. , Disp: , Rfl:     rosuvastatin (CRESTOR) 10 mg tablet, Take 10 mg by mouth nightly., Disp: , Rfl:     Diet/po intake: unk Jh Ann(NP)

## 2021-06-25 ENCOUNTER — APPOINTMENT (OUTPATIENT)
Dept: RADIATION THERAPY | Age: 56
End: 2021-06-25
Payer: COMMERCIAL

## 2021-06-28 ENCOUNTER — APPOINTMENT (OUTPATIENT)
Dept: RADIATION THERAPY | Age: 56
End: 2021-06-28
Payer: COMMERCIAL

## 2021-06-29 ENCOUNTER — APPOINTMENT (OUTPATIENT)
Dept: RADIATION THERAPY | Age: 56
End: 2021-06-29
Payer: COMMERCIAL

## 2021-06-30 ENCOUNTER — APPOINTMENT (OUTPATIENT)
Dept: RADIATION THERAPY | Age: 56
End: 2021-06-30
Payer: COMMERCIAL

## 2021-07-01 ENCOUNTER — APPOINTMENT (OUTPATIENT)
Dept: RADIATION THERAPY | Age: 56
End: 2021-07-01

## 2021-07-02 ENCOUNTER — TELEPHONE (OUTPATIENT)
Dept: RADIATION THERAPY | Age: 56
End: 2021-07-02

## 2021-07-02 ENCOUNTER — APPOINTMENT (OUTPATIENT)
Dept: RADIATION THERAPY | Age: 56
End: 2021-07-02

## 2021-07-02 NOTE — TELEPHONE ENCOUNTER
Medical Nutrition Therapy Note    Nutritional Assessment:  IBW: Ideal body weight: 130 lb 11.7 oz (59.3 kg)  Adjusted ideal body weight: 196 lb 8.3 oz (89.1 kg)   UBW: unk  Wt hx:   Wt Readings from Last 20 Encounters:   05/24/21 295 lb 3.2 oz (133.9 kg)   04/06/21 287 lb (130.2 kg)   03/31/21 290 lb 2 oz (131.6 kg)   03/31/21 290 lb 2 oz (131.6 kg)   03/04/21 287 lb (130.2 kg)   11/04/20 287 lb (130.2 kg)   04/30/19 282 lb (127.9 kg)   09/30/13 260 lb (117.9 kg)   09/29/13 260 lb (117.9 kg)   10/24/12 269 lb (122 kg)     Current Ht:   Ht Readings from Last 1 Encounters:   05/24/21 5' 6\" (1.676 m)       Current BMI: Estimated body mass index is 47.65 kg/m² as calculated from the following:    Height as of 5/24/21: 5' 6\" (1.676 m). Weight as of 5/24/21: 295 lb 3.2 oz (133.9 kg). Wt change:  2.2% weight gain since last assessment (per [facility] documented weights)    Estimated nutritional needs:   Calorie Range: 2440   Formula:  MSJ x 1.25  Protein Range: 130-155  Formula: 1.0-1.2 g/kg  Fluid Needs: 1 mL/kcal    Nutrition-impact symptoms: None    05/20/21: Pt requested meeting with writer. Pt asked why she's gaining weight. Pt endorses alcohol and smoking cessation after being diagnosed with cancer. Pt reports having a difficult time and being stressed regarding work, bills, family, and cancer. Pt stated that she'll grab chips, sweets, and cookies while at home. Pt acknowledged what foods are calorically dense and that stress is currently a major barrier for her. Pt mentioned that her physical activity level has declined now that she's not working. 05/26/21: Pt reports walking 4x over the past 1 week. Pt mentioned that her doctor prescribed potassium supplement and told her she's prediabetic. Pt reports still eating a lot of sweets, but noted intake decreased a little over the past 1 week.   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 Pt stated that she's been eating less sweets and that she's been reading labels to look for whole grains. Writer answered all pt questions related to last week's educations (glucose & potassium) to her satisfaction. 06/10/21: Pt reports walking 1x over the last week. Pt also reports only eating sweets once over the last week. Pt mentioned that she has long periods of time without eating r/t being out of the house. 06/16/21: Pt endorses meeting her walking goal over the last week. Pt reports being much more physically active recently and has noticed an improvement in her mental status. Pt mentioned that she was more depressed before, but is happier now. Pt continues to follow dietary changes, but continues to struggle with minimizing long periods without any dietary intake while she's out of the house. 06/23/21: Pt denies meeting her walking goal and endorses going long periods without eating over the last week related to pain from her skin. Pt reports wt gain over the last week, but noted her goal is to lose wt. Pt stated that she got some ensure supplements to aid in minimizing time between dietary intake when outside the home. 07/02/21: Pt reports meeting her walking goal over the last week. Pt mentioned that she's drinking ensure supplements when she'd otherwise miss a meal.    Nutritional Diagnosis:  poor nutritional quality of life related to food or activity behavior-related difficulty as evidenced by new medical diagnosis (cancer) and recent lifestyle or life changes (quit smoking, quit drinking, & work change)    Nutritional Interventions:   1. Guiding pt with making lifestyle changes  2. Encouraged pt to continue working on lifestyle/nutritional changes    Previously   1. Pt agreed to have oncology social worker speak with her  2. Provided and reviewed \"Blood Glucose Management\" handout  3. Provided and reviewed \"Potassium\" handout  4.  Encouraged pt to include protein with all of her meals  5. Encouraged pt to utilize fruit as a way to satisfy sweet tooth, if ever struggling to limit sweet intake  6. Encouraged pt to have food available that doesn't require refrigeration that would be easy to take with her when she leaves the house for most of the day  7. Provided pt with Ensure High Protein sample to trial and coupon to aid in bridging time between dietary intakes    Nutritional Monitoring/Goals:   1. Pt set goal #1: will walk at least 3x per week - met 1 week, ongoing    Initial consult per other: patient self-referral    Cancer Dx: No matching staging information was found for the patient. Cancer Staging  No matching staging information was found for the patient. Treatment Plan: No flowsheet data found. +   Radiation Treatments     No radiation treatments to show.  (Treatments may have been administered in another system.)          Lab Results   Component Value Date/Time    Sodium 142 06/07/2021 10:58 AM    Sodium 140 05/24/2021 11:09 AM    Sodium 141 03/24/2021 02:39 PM    Sodium 143 04/30/2019 11:45 AM    Sodium 140 09/30/2013 05:27 PM    Potassium 4.0 06/07/2021 10:58 AM    Chloride 102 06/07/2021 10:58 AM    Chloride 104 05/24/2021 11:09 AM    Chloride 107 03/24/2021 02:39 PM    Chloride 107 04/30/2019 11:45 AM    Chloride 106 09/30/2013 05:27 PM    CO2 20 06/07/2021 10:58 AM    CO2 31 05/24/2021 11:09 AM    CO2 31 03/24/2021 02:39 PM    CO2 31 04/30/2019 11:45 AM    CO2 28 09/30/2013 05:27 PM    Anion gap 5 05/24/2021 11:09 AM    Anion gap 4 (L) 03/24/2021 02:39 PM    Anion gap 5 04/30/2019 11:45 AM    Anion gap 6 09/30/2013 05:27 PM    Anion gap 10 03/31/2010 05:32 PM    Glucose 102 (H) 06/07/2021 10:58 AM    Glucose 108 (H) 05/24/2021 11:09 AM    Glucose 133 (H) 03/24/2021 02:39 PM    Glucose 88 04/30/2019 11:45 AM    Glucose 84 09/30/2013 05:27 PM    BUN 10 06/07/2021 10:58 AM    BUN 21 (H) 05/24/2021 11:09 AM    BUN 12 03/24/2021 02:39 PM    BUN 11 04/30/2019 11:45 AM    BUN 9 09/30/2013 05:27 PM    Creatinine 0.74 06/07/2021 10:58 AM    Creatinine 0.86 05/24/2021 11:09 AM    Creatinine 0.8 03/24/2021 02:39 PM    Creatinine 0.86 04/30/2019 11:45 AM    Creatinine 0.86 09/30/2013 05:27 PM    BUN/Creatinine ratio 14 06/07/2021 10:58 AM    BUN/Creatinine ratio 24 (H) 05/24/2021 11:09 AM    BUN/Creatinine ratio 13 04/30/2019 11:45 AM    BUN/Creatinine ratio 10 (L) 09/30/2013 05:27 PM    BUN/Creatinine ratio 21 (H) 03/31/2010 05:32 PM    GFR est  06/07/2021 10:58 AM    GFR est AA >60 05/24/2021 11:09 AM    GFR est AA >60.0 03/24/2021 02:39 PM    GFR est AA >60 04/30/2019 11:45 AM    GFR est AA >60 09/30/2013 05:27 PM    GFR est non-AA 93 06/07/2021 10:58 AM    GFR est non-AA >60 05/24/2021 11:09 AM    GFR est non-AA >60 03/24/2021 02:39 PM    GFR est non-AA >60 04/30/2019 11:45 AM    GFR est non-AA >60 09/30/2013 05:27 PM    Calcium 10.1 06/07/2021 10:58 AM    Calcium 9.4 05/24/2021 11:09 AM    Calcium 9.9 03/24/2021 02:39 PM    Calcium 10.3 (H) 04/30/2019 11:45 AM    Calcium 8.9 09/30/2013 05:27 PM         Current Outpatient Medications:     amLODIPine (NORVASC) 5 mg tablet, Take 1 Tablet by mouth daily. Indications: high blood pressure, Disp: 90 Tablet, Rfl: 1    spironolactone (ALDACTONE) 25 mg tablet, Take 1 Tablet by mouth daily. , Disp: 90 Tablet, Rfl: 1    OTHER, Cancer medication daily, Disp: , Rfl:     cholecalciferol, vitamin D3, (Vitamin D3) 50 mcg (2,000 unit) tab, Take 1 Tab by mouth daily. , Disp: , Rfl:     rosuvastatin (CRESTOR) 10 mg tablet, Take 10 mg by mouth nightly., Disp: , Rfl:     Diet/po intake: unk

## 2021-07-05 ENCOUNTER — APPOINTMENT (OUTPATIENT)
Dept: RADIATION THERAPY | Age: 56
End: 2021-07-05

## 2021-07-06 ENCOUNTER — APPOINTMENT (OUTPATIENT)
Dept: RADIATION THERAPY | Age: 56
End: 2021-07-06

## 2021-07-07 ENCOUNTER — APPOINTMENT (OUTPATIENT)
Dept: RADIATION THERAPY | Age: 56
End: 2021-07-07

## 2021-07-08 ENCOUNTER — APPOINTMENT (OUTPATIENT)
Dept: RADIATION THERAPY | Age: 56
End: 2021-07-08

## 2021-07-09 ENCOUNTER — TELEPHONE (OUTPATIENT)
Dept: RADIATION THERAPY | Age: 56
End: 2021-07-09

## 2021-07-09 NOTE — TELEPHONE ENCOUNTER
Medical Nutrition Therapy Note    Nutritional Assessment:  IBW: Ideal body weight: 130 lb 11.7 oz (59.3 kg)  Adjusted ideal body weight: 196 lb 8.3 oz (89.1 kg)   UBW: unk  Wt hx:   Wt Readings from Last 20 Encounters:   05/24/21 295 lb 3.2 oz (133.9 kg)   04/06/21 287 lb (130.2 kg)   03/31/21 290 lb 2 oz (131.6 kg)   03/31/21 290 lb 2 oz (131.6 kg)   03/04/21 287 lb (130.2 kg)   11/04/20 287 lb (130.2 kg)   04/30/19 282 lb (127.9 kg)   09/30/13 260 lb (117.9 kg)   09/29/13 260 lb (117.9 kg)   10/24/12 269 lb (122 kg)     Current Ht:   Ht Readings from Last 1 Encounters:   05/24/21 5' 6\" (1.676 m)       Current BMI: Estimated body mass index is 47.65 kg/m² as calculated from the following:    Height as of 5/24/21: 5' 6\" (1.676 m). Weight as of 5/24/21: 295 lb 3.2 oz (133.9 kg). Wt change:  2.2% weight gain since last assessment (per [facility] documented weights)    Estimated nutritional needs:   Calorie Range: 2440   Formula:  MSJ x 1.25  Protein Range: 130-155  Formula: 1.0-1.2 g/kg  Fluid Needs: 1 mL/kcal    Nutrition-impact symptoms: None    05/20/21: Pt requested meeting with writer. Pt asked why she's gaining weight. Pt endorses alcohol and smoking cessation after being diagnosed with cancer. Pt reports having a difficult time and being stressed regarding work, bills, family, and cancer. Pt stated that she'll grab chips, sweets, and cookies while at home. Pt acknowledged what foods are calorically dense and that stress is currently a major barrier for her. Pt mentioned that her physical activity level has declined now that she's not working. 05/26/21: Pt reports walking 4x over the past 1 week. Pt mentioned that her doctor prescribed potassium supplement and told her she's prediabetic. Pt reports still eating a lot of sweets, but noted intake decreased a little over the past 1 week.   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 Pt stated that she's been eating less sweets and that she's been reading labels to look for whole grains. Writer answered all pt questions related to last week's educations (glucose & potassium) to her satisfaction. 06/10/21: Pt reports walking 1x over the last week. Pt also reports only eating sweets once over the last week. Pt mentioned that she has long periods of time without eating r/t being out of the house. 06/16/21: Pt endorses meeting her walking goal over the last week. Pt reports being much more physically active recently and has noticed an improvement in her mental status. Pt mentioned that she was more depressed before, but is happier now. Pt continues to follow dietary changes, but continues to struggle with minimizing long periods without any dietary intake while she's out of the house. 06/23/21: Pt denies meeting her walking goal and endorses going long periods without eating over the last week related to pain from her skin. Pt reports wt gain over the last week, but noted her goal is to lose wt. Pt stated that she got some ensure supplements to aid in minimizing time between dietary intake when outside the home. 07/02/21: Pt reports meeting her walking goal over the last week. Pt mentioned that she's drinking ensure supplements when she'd otherwise miss a meal.  07/09/21: Pt reports walking 4x over the past week. Pt stated that she's remaining active within her house on the days that she doesn't walk. Nutritional Diagnosis:  poor nutritional quality of life related to food or activity behavior-related difficulty as evidenced by new medical diagnosis (cancer) and recent lifestyle or life changes (quit smoking, quit drinking, & work change)    Nutritional Interventions:   1. Guiding pt with making lifestyle changes  2. Encouraged pt to continue working on lifestyle/nutritional changes    Previously   1. Pt agreed to have oncology social worker speak with her  2. Provided and reviewed \"Blood Glucose Management\" handout  3. Provided and reviewed \"Potassium\" handout  4. Encouraged pt to include protein with all of her meals  5. Encouraged pt to utilize fruit as a way to satisfy sweet tooth, if ever struggling to limit sweet intake  6. Encouraged pt to have food available that doesn't require refrigeration that would be easy to take with her when she leaves the house for most of the day  7. Provided pt with Ensure High Protein sample to trial and coupon to aid in bridging time between dietary intakes    Nutritional Monitoring/Goals:   1. Pt set goal #1: will walk at least 3x per week - met 2 weeks, ongoing    Initial consult per other: patient self-referral    Cancer Dx: No matching staging information was found for the patient. Cancer Staging  No matching staging information was found for the patient. Treatment Plan: No flowsheet data found. +   Radiation Treatments     No radiation treatments to show.  (Treatments may have been administered in another system.)          Lab Results   Component Value Date/Time    Sodium 142 06/07/2021 10:58 AM    Sodium 140 05/24/2021 11:09 AM    Sodium 141 03/24/2021 02:39 PM    Sodium 143 04/30/2019 11:45 AM    Sodium 140 09/30/2013 05:27 PM    Potassium 4.0 06/07/2021 10:58 AM    Chloride 102 06/07/2021 10:58 AM    Chloride 104 05/24/2021 11:09 AM    Chloride 107 03/24/2021 02:39 PM    Chloride 107 04/30/2019 11:45 AM    Chloride 106 09/30/2013 05:27 PM    CO2 20 06/07/2021 10:58 AM    CO2 31 05/24/2021 11:09 AM    CO2 31 03/24/2021 02:39 PM    CO2 31 04/30/2019 11:45 AM    CO2 28 09/30/2013 05:27 PM    Anion gap 5 05/24/2021 11:09 AM    Anion gap 4 (L) 03/24/2021 02:39 PM    Anion gap 5 04/30/2019 11:45 AM    Anion gap 6 09/30/2013 05:27 PM    Anion gap 10 03/31/2010 05:32 PM    Glucose 102 (H) 06/07/2021 10:58 AM    Glucose 108 (H) 05/24/2021 11:09 AM    Glucose 133 (H) 03/24/2021 02:39 PM    Glucose 88 04/30/2019 11:45 AM    Glucose 84 09/30/2013 05:27 PM    BUN 10 06/07/2021 10:58 AM    BUN 21 (H) 05/24/2021 11:09 AM    BUN 12 03/24/2021 02:39 PM    BUN 11 04/30/2019 11:45 AM    BUN 9 09/30/2013 05:27 PM    Creatinine 0.74 06/07/2021 10:58 AM    Creatinine 0.86 05/24/2021 11:09 AM    Creatinine 0.8 03/24/2021 02:39 PM    Creatinine 0.86 04/30/2019 11:45 AM    Creatinine 0.86 09/30/2013 05:27 PM    BUN/Creatinine ratio 14 06/07/2021 10:58 AM    BUN/Creatinine ratio 24 (H) 05/24/2021 11:09 AM    BUN/Creatinine ratio 13 04/30/2019 11:45 AM    BUN/Creatinine ratio 10 (L) 09/30/2013 05:27 PM    BUN/Creatinine ratio 21 (H) 03/31/2010 05:32 PM    GFR est  06/07/2021 10:58 AM    GFR est AA >60 05/24/2021 11:09 AM    GFR est AA >60.0 03/24/2021 02:39 PM    GFR est AA >60 04/30/2019 11:45 AM    GFR est AA >60 09/30/2013 05:27 PM    GFR est non-AA 93 06/07/2021 10:58 AM    GFR est non-AA >60 05/24/2021 11:09 AM    GFR est non-AA >60 03/24/2021 02:39 PM    GFR est non-AA >60 04/30/2019 11:45 AM    GFR est non-AA >60 09/30/2013 05:27 PM    Calcium 10.1 06/07/2021 10:58 AM    Calcium 9.4 05/24/2021 11:09 AM    Calcium 9.9 03/24/2021 02:39 PM    Calcium 10.3 (H) 04/30/2019 11:45 AM    Calcium 8.9 09/30/2013 05:27 PM         Current Outpatient Medications:     amLODIPine (NORVASC) 5 mg tablet, Take 1 Tablet by mouth daily. Indications: high blood pressure, Disp: 90 Tablet, Rfl: 1    spironolactone (ALDACTONE) 25 mg tablet, Take 1 Tablet by mouth daily. , Disp: 90 Tablet, Rfl: 1    OTHER, Cancer medication daily, Disp: , Rfl:     cholecalciferol, vitamin D3, (Vitamin D3) 50 mcg (2,000 unit) tab, Take 1 Tab by mouth daily. , Disp: , Rfl:     rosuvastatin (CRESTOR) 10 mg tablet, Take 10 mg by mouth nightly., Disp: , Rfl:     Diet/po intake: unk

## 2021-07-15 ENCOUNTER — TELEPHONE (OUTPATIENT)
Dept: RADIATION THERAPY | Age: 56
End: 2021-07-15

## 2021-07-15 NOTE — TELEPHONE ENCOUNTER
Medical Nutrition Therapy Note    Nutritional Assessment:  IBW: Ideal body weight: 130 lb 11.7 oz (59.3 kg)  Adjusted ideal body weight: 196 lb 8.3 oz (89.1 kg)   UBW: unk  Wt hx:   Wt Readings from Last 20 Encounters:   05/24/21 295 lb 3.2 oz (133.9 kg)   04/06/21 287 lb (130.2 kg)   03/31/21 290 lb 2 oz (131.6 kg)   03/31/21 290 lb 2 oz (131.6 kg)   03/04/21 287 lb (130.2 kg)   11/04/20 287 lb (130.2 kg)   04/30/19 282 lb (127.9 kg)   09/30/13 260 lb (117.9 kg)   09/29/13 260 lb (117.9 kg)   10/24/12 269 lb (122 kg)     Current Ht:   Ht Readings from Last 1 Encounters:   05/24/21 5' 6\" (1.676 m)       Current BMI: Estimated body mass index is 47.65 kg/m² as calculated from the following:    Height as of 5/24/21: 5' 6\" (1.676 m). Weight as of 5/24/21: 295 lb 3.2 oz (133.9 kg). Wt change:  2.2% weight gain since last assessment (per [facility] documented weights)    Estimated nutritional needs:   Calorie Range: 2440   Formula:  MSJ x 1.25  Protein Range: 130-155  Formula: 1.0-1.2 g/kg  Fluid Needs: 1 mL/kcal    Nutrition-impact symptoms: None    05/20/21: Pt requested meeting with writer. Pt asked why she's gaining weight. Pt endorses alcohol and smoking cessation after being diagnosed with cancer. Pt reports having a difficult time and being stressed regarding work, bills, family, and cancer. Pt stated that she'll grab chips, sweets, and cookies while at home. Pt acknowledged what foods are calorically dense and that stress is currently a major barrier for her. Pt mentioned that her physical activity level has declined now that she's not working. 05/26/21: Pt reports walking 4x over the past 1 week. Pt mentioned that her doctor prescribed potassium supplement and told her she's prediabetic. Pt reports still eating a lot of sweets, but noted intake decreased a little over the past 1 week.   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 Pt stated that she's been eating less sweets and that she's been reading labels to look for whole grains. Writer answered all pt questions related to last week's educations (glucose & potassium) to her satisfaction. 06/10/21: Pt reports walking 1x over the last week. Pt also reports only eating sweets once over the last week. Pt mentioned that she has long periods of time without eating r/t being out of the house. 06/16/21: Pt endorses meeting her walking goal over the last week. Pt reports being much more physically active recently and has noticed an improvement in her mental status. Pt mentioned that she was more depressed before, but is happier now. Pt continues to follow dietary changes, but continues to struggle with minimizing long periods without any dietary intake while she's out of the house. 06/23/21: Pt denies meeting her walking goal and endorses going long periods without eating over the last week related to pain from her skin. Pt reports wt gain over the last week, but noted her goal is to lose wt. Pt stated that she got some ensure supplements to aid in minimizing time between dietary intake when outside the home. 07/02/21: Pt reports meeting her walking goal over the last week. Pt mentioned that she's drinking ensure supplements when she'd otherwise miss a meal.  07/09/21: Pt reports walking 4x over the past week. Pt stated that she's remaining active within her house on the days that she doesn't walk.  07/15/21: Pt reports exceeding her goal.    Nutritional Diagnosis:  poor nutritional quality of life related to food or activity behavior-related difficulty as evidenced by new medical diagnosis (cancer) and recent lifestyle or life changes (quit smoking, quit drinking, & work change)    Nutritional Interventions:   1. Guiding pt with making lifestyle changes  2. Encouraged pt to continue working on lifestyle/nutritional changes    Previously   1.  Pt agreed to have oncology social worker speak with her  2. Provided and reviewed \"Blood Glucose Management\" handout  3. Provided and reviewed \"Potassium\" handout  4. Encouraged pt to include protein with all of her meals  5. Encouraged pt to utilize fruit as a way to satisfy sweet tooth, if ever struggling to limit sweet intake  6. Encouraged pt to have food available that doesn't require refrigeration that would be easy to take with her when she leaves the house for most of the day  7. Provided pt with Ensure High Protein sample to trial and coupon to aid in bridging time between dietary intakes    Nutritional Monitoring/Goals:   1. Pt set goal #1: will walk at least 3x per week - met 3 weeks, ongoing  2. Pt set goal #2: will go to the gym at least 3x per week - new    Initial consult per other: patient self-referral    Cancer Dx: No matching staging information was found for the patient. Cancer Staging  No matching staging information was found for the patient. Treatment Plan: No flowsheet data found. +   Radiation Treatments     No radiation treatments to show.  (Treatments may have been administered in another system.)          Lab Results   Component Value Date/Time    Sodium 142 06/07/2021 10:58 AM    Sodium 140 05/24/2021 11:09 AM    Sodium 141 03/24/2021 02:39 PM    Sodium 143 04/30/2019 11:45 AM    Sodium 140 09/30/2013 05:27 PM    Potassium 4.0 06/07/2021 10:58 AM    Chloride 102 06/07/2021 10:58 AM    Chloride 104 05/24/2021 11:09 AM    Chloride 107 03/24/2021 02:39 PM    Chloride 107 04/30/2019 11:45 AM    Chloride 106 09/30/2013 05:27 PM    CO2 20 06/07/2021 10:58 AM    CO2 31 05/24/2021 11:09 AM    CO2 31 03/24/2021 02:39 PM    CO2 31 04/30/2019 11:45 AM    CO2 28 09/30/2013 05:27 PM    Anion gap 5 05/24/2021 11:09 AM    Anion gap 4 (L) 03/24/2021 02:39 PM    Anion gap 5 04/30/2019 11:45 AM    Anion gap 6 09/30/2013 05:27 PM    Anion gap 10 03/31/2010 05:32 PM    Glucose 102 (H) 06/07/2021 10:58 AM    Glucose 108 (H) 05/24/2021 11:09 AM    Glucose 133 (H) 03/24/2021 02:39 PM    Glucose 88 04/30/2019 11:45 AM    Glucose 84 09/30/2013 05:27 PM    BUN 10 06/07/2021 10:58 AM    BUN 21 (H) 05/24/2021 11:09 AM    BUN 12 03/24/2021 02:39 PM    BUN 11 04/30/2019 11:45 AM    BUN 9 09/30/2013 05:27 PM    Creatinine 0.74 06/07/2021 10:58 AM    Creatinine 0.86 05/24/2021 11:09 AM    Creatinine 0.8 03/24/2021 02:39 PM    Creatinine 0.86 04/30/2019 11:45 AM    Creatinine 0.86 09/30/2013 05:27 PM    BUN/Creatinine ratio 14 06/07/2021 10:58 AM    BUN/Creatinine ratio 24 (H) 05/24/2021 11:09 AM    BUN/Creatinine ratio 13 04/30/2019 11:45 AM    BUN/Creatinine ratio 10 (L) 09/30/2013 05:27 PM    BUN/Creatinine ratio 21 (H) 03/31/2010 05:32 PM    GFR est  06/07/2021 10:58 AM    GFR est AA >60 05/24/2021 11:09 AM    GFR est AA >60.0 03/24/2021 02:39 PM    GFR est AA >60 04/30/2019 11:45 AM    GFR est AA >60 09/30/2013 05:27 PM    GFR est non-AA 93 06/07/2021 10:58 AM    GFR est non-AA >60 05/24/2021 11:09 AM    GFR est non-AA >60 03/24/2021 02:39 PM    GFR est non-AA >60 04/30/2019 11:45 AM    GFR est non-AA >60 09/30/2013 05:27 PM    Calcium 10.1 06/07/2021 10:58 AM    Calcium 9.4 05/24/2021 11:09 AM    Calcium 9.9 03/24/2021 02:39 PM    Calcium 10.3 (H) 04/30/2019 11:45 AM    Calcium 8.9 09/30/2013 05:27 PM         Current Outpatient Medications:     amLODIPine (NORVASC) 5 mg tablet, Take 1 Tablet by mouth daily. Indications: high blood pressure, Disp: 90 Tablet, Rfl: 1    spironolactone (ALDACTONE) 25 mg tablet, Take 1 Tablet by mouth daily. , Disp: 90 Tablet, Rfl: 1    OTHER, Cancer medication daily, Disp: , Rfl:     cholecalciferol, vitamin D3, (Vitamin D3) 50 mcg (2,000 unit) tab, Take 1 Tab by mouth daily. , Disp: , Rfl:     rosuvastatin (CRESTOR) 10 mg tablet, Take 10 mg by mouth nightly., Disp: , Rfl:     Diet/po intake: unk

## 2021-07-22 ENCOUNTER — TELEPHONE (OUTPATIENT)
Dept: RADIATION THERAPY | Age: 56
End: 2021-07-22

## 2021-07-22 NOTE — TELEPHONE ENCOUNTER
Medical Nutrition Therapy Note    Nutritional Assessment:  IBW: Ideal body weight: 130 lb 11.7 oz (59.3 kg)  Adjusted ideal body weight: 196 lb 8.3 oz (89.1 kg)   UBW: unk  Wt hx:   Wt Readings from Last 20 Encounters:   05/24/21 295 lb 3.2 oz (133.9 kg)   04/06/21 287 lb (130.2 kg)   03/31/21 290 lb 2 oz (131.6 kg)   03/31/21 290 lb 2 oz (131.6 kg)   03/04/21 287 lb (130.2 kg)   11/04/20 287 lb (130.2 kg)   04/30/19 282 lb (127.9 kg)   09/30/13 260 lb (117.9 kg)   09/29/13 260 lb (117.9 kg)   10/24/12 269 lb (122 kg)     Current Ht:   Ht Readings from Last 1 Encounters:   05/24/21 5' 6\" (1.676 m)       Current BMI: Estimated body mass index is 47.65 kg/m² as calculated from the following:    Height as of 5/24/21: 5' 6\" (1.676 m). Weight as of 5/24/21: 295 lb 3.2 oz (133.9 kg). Wt change:  2.2% weight gain since last assessment (per [facility] documented weights)    Estimated nutritional needs:   Calorie Range: 2440   Formula:  MSJ x 1.25  Protein Range: 130-155  Formula: 1.0-1.2 g/kg  Fluid Needs: 1 mL/kcal    Nutrition-impact symptoms: None    05/20/21: Pt requested meeting with writer. Pt asked why she's gaining weight. Pt endorses alcohol and smoking cessation after being diagnosed with cancer. Pt reports having a difficult time and being stressed regarding work, bills, family, and cancer. Pt stated that she'll grab chips, sweets, and cookies while at home. Pt acknowledged what foods are calorically dense and that stress is currently a major barrier for her. Pt mentioned that her physical activity level has declined now that she's not working. 05/26/21: Pt reports walking 4x over the past 1 week. Pt mentioned that her doctor prescribed potassium supplement and told her she's prediabetic. Pt reports still eating a lot of sweets, but noted intake decreased a little over the past 1 week.   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 Pt stated that she's been eating less sweets and that she's been reading labels to look for whole grains. Writer answered all pt questions related to last week's educations (glucose & potassium) to her satisfaction. 06/10/21: Pt reports walking 1x over the last week. Pt also reports only eating sweets once over the last week. Pt mentioned that she has long periods of time without eating r/t being out of the house. 06/16/21: Pt endorses meeting her walking goal over the last week. Pt reports being much more physically active recently and has noticed an improvement in her mental status. Pt mentioned that she was more depressed before, but is happier now. Pt continues to follow dietary changes, but continues to struggle with minimizing long periods without any dietary intake while she's out of the house. 06/23/21: Pt denies meeting her walking goal and endorses going long periods without eating over the last week related to pain from her skin. Pt reports wt gain over the last week, but noted her goal is to lose wt. Pt stated that she got some ensure supplements to aid in minimizing time between dietary intake when outside the home. 07/02/21: Pt reports meeting her walking goal over the last week. Pt mentioned that she's drinking ensure supplements when she'd otherwise miss a meal.  07/09/21: Pt reports walking 4x over the past week. Pt stated that she's remaining active within her house on the days that she doesn't walk.  07/15/21: Pt reports exceeding her goal.  07/22/21: Pt denies going to the gym yet. Pt reports helping her aunt and not having time to go. Pt requested that her gym goal be delayed another week d/t helping her aunt. Pt stated that she has kept up with her walking.     Nutritional Diagnosis:  poor nutritional quality of life related to food or activity behavior-related difficulty as evidenced by new medical diagnosis (cancer) and recent lifestyle or life changes (quit smoking, quit drinking, & work change)    Nutritional Interventions:   1. Guiding pt with making lifestyle changes  2. Encouraged pt to continue working on lifestyle/nutritional changes    Previously   1. Pt agreed to have oncology social worker speak with her  2. Provided and reviewed \"Blood Glucose Management\" handout  3. Provided and reviewed \"Potassium\" handout  4. Encouraged pt to include protein with all of her meals  5. Encouraged pt to utilize fruit as a way to satisfy sweet tooth, if ever struggling to limit sweet intake  6. Encouraged pt to have food available that doesn't require refrigeration that would be easy to take with her when she leaves the house for most of the day  7. Provided pt with Ensure High Protein sample to trial and coupon to aid in bridging time between dietary intakes    Nutritional Monitoring/Goals:   1. Pt set goal #1: will walk at least 3x per week - met > 3 weeks, ongoing  2. Pt set goal #2: will go to the gym at least 3x per week - new    Initial consult per other: patient self-referral    Cancer Dx: No matching staging information was found for the patient. Cancer Staging  No matching staging information was found for the patient. Treatment Plan: No flowsheet data found. +   Radiation Treatments     No radiation treatments to show.  (Treatments may have been administered in another system.)          Lab Results   Component Value Date/Time    Sodium 142 06/07/2021 10:58 AM    Sodium 140 05/24/2021 11:09 AM    Sodium 141 03/24/2021 02:39 PM    Sodium 143 04/30/2019 11:45 AM    Sodium 140 09/30/2013 05:27 PM    Potassium 4.0 06/07/2021 10:58 AM    Chloride 102 06/07/2021 10:58 AM    Chloride 104 05/24/2021 11:09 AM    Chloride 107 03/24/2021 02:39 PM    Chloride 107 04/30/2019 11:45 AM    Chloride 106 09/30/2013 05:27 PM    CO2 20 06/07/2021 10:58 AM    CO2 31 05/24/2021 11:09 AM    CO2 31 03/24/2021 02:39 PM    CO2 31 04/30/2019 11:45 AM    CO2 28 09/30/2013 05:27 PM    Anion gap 5 05/24/2021 11:09 AM    Anion gap 4 (L) 03/24/2021 02:39 PM    Anion gap 5 04/30/2019 11:45 AM    Anion gap 6 09/30/2013 05:27 PM    Anion gap 10 03/31/2010 05:32 PM    Glucose 102 (H) 06/07/2021 10:58 AM    Glucose 108 (H) 05/24/2021 11:09 AM    Glucose 133 (H) 03/24/2021 02:39 PM    Glucose 88 04/30/2019 11:45 AM    Glucose 84 09/30/2013 05:27 PM    BUN 10 06/07/2021 10:58 AM    BUN 21 (H) 05/24/2021 11:09 AM    BUN 12 03/24/2021 02:39 PM    BUN 11 04/30/2019 11:45 AM    BUN 9 09/30/2013 05:27 PM    Creatinine 0.74 06/07/2021 10:58 AM    Creatinine 0.86 05/24/2021 11:09 AM    Creatinine 0.8 03/24/2021 02:39 PM    Creatinine 0.86 04/30/2019 11:45 AM    Creatinine 0.86 09/30/2013 05:27 PM    BUN/Creatinine ratio 14 06/07/2021 10:58 AM    BUN/Creatinine ratio 24 (H) 05/24/2021 11:09 AM    BUN/Creatinine ratio 13 04/30/2019 11:45 AM    BUN/Creatinine ratio 10 (L) 09/30/2013 05:27 PM    BUN/Creatinine ratio 21 (H) 03/31/2010 05:32 PM    GFR est  06/07/2021 10:58 AM    GFR est AA >60 05/24/2021 11:09 AM    GFR est AA >60.0 03/24/2021 02:39 PM    GFR est AA >60 04/30/2019 11:45 AM    GFR est AA >60 09/30/2013 05:27 PM    GFR est non-AA 93 06/07/2021 10:58 AM    GFR est non-AA >60 05/24/2021 11:09 AM    GFR est non-AA >60 03/24/2021 02:39 PM    GFR est non-AA >60 04/30/2019 11:45 AM    GFR est non-AA >60 09/30/2013 05:27 PM    Calcium 10.1 06/07/2021 10:58 AM    Calcium 9.4 05/24/2021 11:09 AM    Calcium 9.9 03/24/2021 02:39 PM    Calcium 10.3 (H) 04/30/2019 11:45 AM    Calcium 8.9 09/30/2013 05:27 PM         Current Outpatient Medications:     amLODIPine (NORVASC) 5 mg tablet, Take 1 Tablet by mouth daily. Indications: high blood pressure, Disp: 90 Tablet, Rfl: 1    spironolactone (ALDACTONE) 25 mg tablet, Take 1 Tablet by mouth daily. , Disp: 90 Tablet, Rfl: 1    OTHER, Cancer medication daily, Disp: , Rfl:     cholecalciferol, vitamin D3, (Vitamin D3) 50 mcg (2,000 unit) tab, Take 1 Tab by mouth daily. , Disp: , Rfl:     rosuvastatin (CRESTOR) 10 mg tablet, Take 10 mg by mouth nightly., Disp: , Rfl:     Diet/po intake: unk

## 2021-08-03 ENCOUNTER — TRANSCRIBE ORDER (OUTPATIENT)
Dept: SCHEDULING | Age: 56
End: 2021-08-03

## 2021-08-03 DIAGNOSIS — C50.811 MALIGNANT NEOPLASM OF OVERLAPPING SITES OF RIGHT FEMALE BREAST (HCC): Primary | ICD-10-CM

## 2021-08-06 ENCOUNTER — TELEPHONE (OUTPATIENT)
Dept: RADIATION THERAPY | Age: 56
End: 2021-08-06

## 2021-08-06 ENCOUNTER — HOSPITAL ENCOUNTER (OUTPATIENT)
Dept: RADIATION THERAPY | Age: 56
Discharge: HOME | End: 2021-08-06
Payer: COMMERCIAL

## 2021-08-06 PROCEDURE — 99211 OFF/OP EST MAY X REQ PHY/QHP: CPT

## 2021-08-06 NOTE — TELEPHONE ENCOUNTER
Medical Nutrition Therapy Note    Nutritional Assessment:  IBW: Ideal body weight: 130 lb 11.7 oz (59.3 kg)  Adjusted ideal body weight: 196 lb 8.3 oz (89.1 kg)   UBW: unk  Wt hx:   Wt Readings from Last 20 Encounters:   05/24/21 295 lb 3.2 oz (133.9 kg)   04/06/21 287 lb (130.2 kg)   03/31/21 290 lb 2 oz (131.6 kg)   03/31/21 290 lb 2 oz (131.6 kg)   03/04/21 287 lb (130.2 kg)   11/04/20 287 lb (130.2 kg)   04/30/19 282 lb (127.9 kg)   09/30/13 260 lb (117.9 kg)   09/29/13 260 lb (117.9 kg)   10/24/12 269 lb (122 kg)     Current Ht:   Ht Readings from Last 1 Encounters:   05/24/21 5' 6\" (1.676 m)       Current BMI: Estimated body mass index is 47.65 kg/m² as calculated from the following:    Height as of 5/24/21: 5' 6\" (1.676 m). Weight as of 5/24/21: 295 lb 3.2 oz (133.9 kg). Wt change: 0.4% weight gain since last assessment (per [facility] documented weights)    Estimated nutritional needs:   Calorie Range: 2440   Formula:  MSJ x 1.25  Protein Range: 130-155  Formula: 1.0-1.2 g/kg  Fluid Needs: 1 mL/kcal    Nutrition-impact symptoms: None    05/20/21: Pt requested meeting with writer. Pt asked why she's gaining weight. Pt endorses alcohol and smoking cessation after being diagnosed with cancer. Pt reports having a difficult time and being stressed regarding work, bills, family, and cancer. Pt stated that she'll grab chips, sweets, and cookies while at home. Pt acknowledged what foods are calorically dense and that stress is currently a major barrier for her. Pt mentioned that her physical activity level has declined now that she's not working. 05/26/21: Pt reports walking 4x over the past 1 week. Pt mentioned that her doctor prescribed potassium supplement and told her she's prediabetic. Pt reports still eating a lot of sweets, but noted intake decreased a little over the past 1 week.   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 Pt stated that she's been eating less sweets and that she's been reading labels to look for whole grains. Writer answered all pt questions related to last week's educations (glucose & potassium) to her satisfaction. 06/10/21: Pt reports walking 1x over the last week. Pt also reports only eating sweets once over the last week. Pt mentioned that she has long periods of time without eating r/t being out of the house. 06/16/21: Pt endorses meeting her walking goal over the last week. Pt reports being much more physically active recently and has noticed an improvement in her mental status. Pt mentioned that she was more depressed before, but is happier now. Pt continues to follow dietary changes, but continues to struggle with minimizing long periods without any dietary intake while she's out of the house. 06/23/21: Pt denies meeting her walking goal and endorses going long periods without eating over the last week related to pain from her skin. Pt reports wt gain over the last week, but noted her goal is to lose wt. Pt stated that she got some ensure supplements to aid in minimizing time between dietary intake when outside the home. 07/02/21: Pt reports meeting her walking goal over the last week. Pt mentioned that she's drinking ensure supplements when she'd otherwise miss a meal.  07/09/21: Pt reports walking 4x over the past week. Pt stated that she's remaining active within her house on the days that she doesn't walk.  07/15/21: Pt reports exceeding her goal.  07/22/21: Pt denies going to the gym yet. Pt reports helping her aunt and not having time to go. Pt requested that her gym goal be delayed another week d/t helping her aunt. Pt stated that she has kept up with her walking. 08/06/21: Called pt for follow, but no answer so message left for pt to call writer back.     Nutritional Diagnosis:  poor nutritional quality of life related to food or activity behavior-related difficulty as evidenced by new medical diagnosis (cancer) and recent lifestyle or life changes (quit smoking, quit drinking, & work change)    Nutritional Interventions:   1. Guiding pt with making lifestyle changes  2. Encouraged pt to continue working on lifestyle/nutritional changes    Previously   1. Pt agreed to have oncology social worker speak with her  2. Provided and reviewed \"Blood Glucose Management\" handout  3. Provided and reviewed \"Potassium\" handout  4. Encouraged pt to include protein with all of her meals  5. Encouraged pt to utilize fruit as a way to satisfy sweet tooth, if ever struggling to limit sweet intake  6. Encouraged pt to have food available that doesn't require refrigeration that would be easy to take with her when she leaves the house for most of the day  7. Provided pt with Ensure High Protein sample to trial and coupon to aid in bridging time between dietary intakes    Nutritional Monitoring/Goals:   1. Pt set goal #1: will walk at least 3x per week - met > 3 weeks, ongoing  2. Pt set goal #2: will go to the gym at least 3x per week - new    Initial consult per other: patient self-referral    Cancer Dx: No matching staging information was found for the patient. Cancer Staging  No matching staging information was found for the patient. Treatment Plan: No flowsheet data found. +   Radiation Treatments     No radiation treatments to show.  (Treatments may have been administered in another system.)          Lab Results   Component Value Date/Time    Sodium 142 06/07/2021 10:58 AM    Sodium 140 05/24/2021 11:09 AM    Sodium 141 03/24/2021 02:39 PM    Sodium 143 04/30/2019 11:45 AM    Sodium 140 09/30/2013 05:27 PM    Potassium 4.0 06/07/2021 10:58 AM    Chloride 102 06/07/2021 10:58 AM    Chloride 104 05/24/2021 11:09 AM    Chloride 107 03/24/2021 02:39 PM    Chloride 107 04/30/2019 11:45 AM    Chloride 106 09/30/2013 05:27 PM    CO2 20 06/07/2021 10:58 AM    CO2 31 05/24/2021 11:09 AM    CO2 31 03/24/2021 02:39 PM    CO2 31 04/30/2019 11:45 AM    CO2 28 09/30/2013 05:27 PM    Anion gap 5 05/24/2021 11:09 AM    Anion gap 4 (L) 03/24/2021 02:39 PM    Anion gap 5 04/30/2019 11:45 AM    Anion gap 6 09/30/2013 05:27 PM    Anion gap 10 03/31/2010 05:32 PM    Glucose 102 (H) 06/07/2021 10:58 AM    Glucose 108 (H) 05/24/2021 11:09 AM    Glucose 133 (H) 03/24/2021 02:39 PM    Glucose 88 04/30/2019 11:45 AM    Glucose 84 09/30/2013 05:27 PM    BUN 10 06/07/2021 10:58 AM    BUN 21 (H) 05/24/2021 11:09 AM    BUN 12 03/24/2021 02:39 PM    BUN 11 04/30/2019 11:45 AM    BUN 9 09/30/2013 05:27 PM    Creatinine 0.74 06/07/2021 10:58 AM    Creatinine 0.86 05/24/2021 11:09 AM    Creatinine 0.8 03/24/2021 02:39 PM    Creatinine 0.86 04/30/2019 11:45 AM    Creatinine 0.86 09/30/2013 05:27 PM    BUN/Creatinine ratio 14 06/07/2021 10:58 AM    BUN/Creatinine ratio 24 (H) 05/24/2021 11:09 AM    BUN/Creatinine ratio 13 04/30/2019 11:45 AM    BUN/Creatinine ratio 10 (L) 09/30/2013 05:27 PM    BUN/Creatinine ratio 21 (H) 03/31/2010 05:32 PM    GFR est  06/07/2021 10:58 AM    GFR est AA >60 05/24/2021 11:09 AM    GFR est AA >60.0 03/24/2021 02:39 PM    GFR est AA >60 04/30/2019 11:45 AM    GFR est AA >60 09/30/2013 05:27 PM    GFR est non-AA 93 06/07/2021 10:58 AM    GFR est non-AA >60 05/24/2021 11:09 AM    GFR est non-AA >60 03/24/2021 02:39 PM    GFR est non-AA >60 04/30/2019 11:45 AM    GFR est non-AA >60 09/30/2013 05:27 PM    Calcium 10.1 06/07/2021 10:58 AM    Calcium 9.4 05/24/2021 11:09 AM    Calcium 9.9 03/24/2021 02:39 PM    Calcium 10.3 (H) 04/30/2019 11:45 AM    Calcium 8.9 09/30/2013 05:27 PM         Current Outpatient Medications:     amLODIPine (NORVASC) 5 mg tablet, Take 1 Tablet by mouth daily. Indications: high blood pressure, Disp: 90 Tablet, Rfl: 1    spironolactone (ALDACTONE) 25 mg tablet, Take 1 Tablet by mouth daily. , Disp: 90 Tablet, Rfl: 1    OTHER, Cancer medication daily, Disp: , Rfl:    cholecalciferol, vitamin D3, (Vitamin D3) 50 mcg (2,000 unit) tab, Take 1 Tab by mouth daily. , Disp: , Rfl:     rosuvastatin (CRESTOR) 10 mg tablet, Take 10 mg by mouth nightly., Disp: , Rfl:     Diet/po intake: unk

## 2021-08-09 ENCOUNTER — TELEPHONE (OUTPATIENT)
Dept: RADIATION THERAPY | Age: 56
End: 2021-08-09

## 2021-08-09 NOTE — TELEPHONE ENCOUNTER
Medical Nutrition Therapy Note    Nutritional Assessment:  IBW: Ideal body weight: 130 lb 11.7 oz (59.3 kg)  Adjusted ideal body weight: 196 lb 8.3 oz (89.1 kg)   UBW: unk  Wt hx:   Wt Readings from Last 20 Encounters:   05/24/21 295 lb 3.2 oz (133.9 kg)   04/06/21 287 lb (130.2 kg)   03/31/21 290 lb 2 oz (131.6 kg)   03/31/21 290 lb 2 oz (131.6 kg)   03/04/21 287 lb (130.2 kg)   11/04/20 287 lb (130.2 kg)   04/30/19 282 lb (127.9 kg)   09/30/13 260 lb (117.9 kg)   09/29/13 260 lb (117.9 kg)   10/24/12 269 lb (122 kg)     Current Ht:   Ht Readings from Last 1 Encounters:   05/24/21 5' 6\" (1.676 m)       Current BMI: Estimated body mass index is 47.65 kg/m² as calculated from the following:    Height as of 5/24/21: 5' 6\" (1.676 m). Weight as of 5/24/21: 295 lb 3.2 oz (133.9 kg). Wt change: 0.4% weight gain since last assessment (per [facility] documented weights)    Estimated nutritional needs:   Calorie Range: 2440   Formula:  MSJ x 1.25  Protein Range: 130-155  Formula: 1.0-1.2 g/kg  Fluid Needs: 1 mL/kcal    Nutrition-impact symptoms: None    05/20/21: Pt requested meeting with writer. Pt asked why she's gaining weight. Pt endorses alcohol and smoking cessation after being diagnosed with cancer. Pt reports having a difficult time and being stressed regarding work, bills, family, and cancer. Pt stated that she'll grab chips, sweets, and cookies while at home. Pt acknowledged what foods are calorically dense and that stress is currently a major barrier for her. Pt mentioned that her physical activity level has declined now that she's not working. 05/26/21: Pt reports walking 4x over the past 1 week. Pt mentioned that her doctor prescribed potassium supplement and told her she's prediabetic. Pt reports still eating a lot of sweets, but noted intake decreased a little over the past 1 week.   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 Pt stated that she's been eating less sweets and that she's been reading labels to look for whole grains. Writer answered all pt questions related to last week's educations (glucose & potassium) to her satisfaction. 06/10/21: Pt reports walking 1x over the last week. Pt also reports only eating sweets once over the last week. Pt mentioned that she has long periods of time without eating r/t being out of the house. 06/16/21: Pt endorses meeting her walking goal over the last week. Pt reports being much more physically active recently and has noticed an improvement in her mental status. Pt mentioned that she was more depressed before, but is happier now. Pt continues to follow dietary changes, but continues to struggle with minimizing long periods without any dietary intake while she's out of the house. 06/23/21: Pt denies meeting her walking goal and endorses going long periods without eating over the last week related to pain from her skin. Pt reports wt gain over the last week, but noted her goal is to lose wt. Pt stated that she got some ensure supplements to aid in minimizing time between dietary intake when outside the home. 07/02/21: Pt reports meeting her walking goal over the last week. Pt mentioned that she's drinking ensure supplements when she'd otherwise miss a meal.  07/09/21: Pt reports walking 4x over the past week. Pt stated that she's remaining active within her house on the days that she doesn't walk.  07/15/21: Pt reports exceeding her goal.  07/22/21: Pt denies going to the gym yet. Pt reports helping her aunt and not having time to go. Pt requested that her gym goal be delayed another week d/t helping her aunt. Pt stated that she has kept up with her walking. 08/06/21: Called pt for follow, but no answer so message left for pt to call writer back. 08/09/21: Pt lvm for writer over the weekend, so writer called pt back.  Pt reports that she's still spending too much time at her aunt's house to be able to go to the gym. Pt requested that she change her new goal at this time. Pt stated that she's eating 2 meals per day and needs to go out to get more ensure high protein supplements. Nutritional Diagnosis:  poor nutritional quality of life related to food or activity behavior-related difficulty as evidenced by new medical diagnosis (cancer) and recent lifestyle or life changes (quit smoking, quit drinking, & work change)    Nutritional Interventions:   1. Guiding pt with making lifestyle changes  2. Encouraged pt to continue working on lifestyle/nutritional changes    Previously   1. Pt agreed to have oncology social worker speak with her  2. Provided and reviewed \"Blood Glucose Management\" handout  3. Provided and reviewed \"Potassium\" handout  4. Encouraged pt to include protein with all of her meals  5. Encouraged pt to utilize fruit as a way to satisfy sweet tooth, if ever struggling to limit sweet intake  6. Encouraged pt to have food available that doesn't require refrigeration that would be easy to take with her when she leaves the house for most of the day  7. Provided pt with Ensure High Protein sample to trial and coupon to aid in bridging time between dietary intakes    Nutritional Monitoring/Goals:   1. Pt set goal #1: will walk at least 3x per week - met > 3 weeks, ongoing  2. Pt set goal #2: will eat at least 1 fruit or vegetable every morning - new    Initial consult per other: patient self-referral    Cancer Dx: No matching staging information was found for the patient. Cancer Staging  No matching staging information was found for the patient. Treatment Plan: No flowsheet data found. +   Radiation Treatments     No radiation treatments to show.  (Treatments may have been administered in another system.)          Lab Results   Component Value Date/Time    Sodium 142 06/07/2021 10:58 AM    Sodium 140 05/24/2021 11:09 AM    Sodium 141 03/24/2021 02:39 PM    Sodium 143 04/30/2019 11:45 AM    Sodium 140 09/30/2013 05:27 PM    Potassium 4.0 06/07/2021 10:58 AM    Chloride 102 06/07/2021 10:58 AM    Chloride 104 05/24/2021 11:09 AM    Chloride 107 03/24/2021 02:39 PM    Chloride 107 04/30/2019 11:45 AM    Chloride 106 09/30/2013 05:27 PM    CO2 20 06/07/2021 10:58 AM    CO2 31 05/24/2021 11:09 AM    CO2 31 03/24/2021 02:39 PM    CO2 31 04/30/2019 11:45 AM    CO2 28 09/30/2013 05:27 PM    Anion gap 5 05/24/2021 11:09 AM    Anion gap 4 (L) 03/24/2021 02:39 PM    Anion gap 5 04/30/2019 11:45 AM    Anion gap 6 09/30/2013 05:27 PM    Anion gap 10 03/31/2010 05:32 PM    Glucose 102 (H) 06/07/2021 10:58 AM    Glucose 108 (H) 05/24/2021 11:09 AM    Glucose 133 (H) 03/24/2021 02:39 PM    Glucose 88 04/30/2019 11:45 AM    Glucose 84 09/30/2013 05:27 PM    BUN 10 06/07/2021 10:58 AM    BUN 21 (H) 05/24/2021 11:09 AM    BUN 12 03/24/2021 02:39 PM    BUN 11 04/30/2019 11:45 AM    BUN 9 09/30/2013 05:27 PM    Creatinine 0.74 06/07/2021 10:58 AM    Creatinine 0.86 05/24/2021 11:09 AM    Creatinine 0.8 03/24/2021 02:39 PM    Creatinine 0.86 04/30/2019 11:45 AM    Creatinine 0.86 09/30/2013 05:27 PM    BUN/Creatinine ratio 14 06/07/2021 10:58 AM    BUN/Creatinine ratio 24 (H) 05/24/2021 11:09 AM    BUN/Creatinine ratio 13 04/30/2019 11:45 AM    BUN/Creatinine ratio 10 (L) 09/30/2013 05:27 PM    BUN/Creatinine ratio 21 (H) 03/31/2010 05:32 PM    GFR est  06/07/2021 10:58 AM    GFR est AA >60 05/24/2021 11:09 AM    GFR est AA >60.0 03/24/2021 02:39 PM    GFR est AA >60 04/30/2019 11:45 AM    GFR est AA >60 09/30/2013 05:27 PM    GFR est non-AA 93 06/07/2021 10:58 AM    GFR est non-AA >60 05/24/2021 11:09 AM    GFR est non-AA >60 03/24/2021 02:39 PM    GFR est non-AA >60 04/30/2019 11:45 AM    GFR est non-AA >60 09/30/2013 05:27 PM    Calcium 10.1 06/07/2021 10:58 AM    Calcium 9.4 05/24/2021 11:09 AM    Calcium 9.9 03/24/2021 02:39 PM    Calcium 10.3 (H) 04/30/2019 11:45 AM    Calcium 8.9 09/30/2013 05:27 PM         Current Outpatient Medications:     amLODIPine (NORVASC) 5 mg tablet, Take 1 Tablet by mouth daily. Indications: high blood pressure, Disp: 90 Tablet, Rfl: 1    spironolactone (ALDACTONE) 25 mg tablet, Take 1 Tablet by mouth daily. , Disp: 90 Tablet, Rfl: 1    OTHER, Cancer medication daily, Disp: , Rfl:     cholecalciferol, vitamin D3, (Vitamin D3) 50 mcg (2,000 unit) tab, Take 1 Tab by mouth daily. , Disp: , Rfl:     rosuvastatin (CRESTOR) 10 mg tablet, Take 10 mg by mouth nightly., Disp: , Rfl:     Diet/po intake: 2 meals per day   -morning = eggs, sausage, and coffee   -evening = meat, starch, and vegetable

## 2021-08-12 ENCOUNTER — TRANSCRIBE ORDER (OUTPATIENT)
Dept: SCHEDULING | Age: 56
End: 2021-08-12

## 2021-08-12 DIAGNOSIS — C50.811 MALIGNANT NEOPLASM OF OVERLAPPING SITES OF RIGHT FEMALE BREAST (HCC): Primary | ICD-10-CM

## 2021-08-13 ENCOUNTER — TRANSCRIBE ORDER (OUTPATIENT)
Dept: SCHEDULING | Age: 56
End: 2021-08-13

## 2021-08-13 DIAGNOSIS — C50.811 MALIGNANT NEOPLASM OF OVERLAPPING SITES OF RIGHT FEMALE BREAST (HCC): Primary | ICD-10-CM

## 2021-08-19 ENCOUNTER — HOSPITAL ENCOUNTER (OUTPATIENT)
Dept: CT IMAGING | Age: 56
Discharge: HOME | End: 2021-08-19
Attending: INTERNAL MEDICINE
Payer: COMMERCIAL

## 2021-08-19 DIAGNOSIS — C50.811 MALIGNANT NEOPLASM OF OVERLAPPING SITES OF RIGHT FEMALE BREAST (HCC): ICD-10-CM

## 2021-08-19 LAB — CREAT UR-MCNC: 0.8 MG/DL (ref 0.6–1.3)

## 2021-08-19 PROCEDURE — 71260 CT THORAX DX C+: CPT

## 2021-08-19 PROCEDURE — 82565 ASSAY OF CREATININE: CPT

## 2021-08-19 PROCEDURE — 74011000636 HC RX REV CODE- 636: Performed by: INTERNAL MEDICINE

## 2021-08-19 RX ADMIN — IOPAMIDOL 100 ML: 612 INJECTION, SOLUTION INTRAVENOUS at 16:02

## 2021-08-20 ENCOUNTER — TELEPHONE (OUTPATIENT)
Dept: RADIATION THERAPY | Age: 56
End: 2021-08-20

## 2021-08-20 NOTE — TELEPHONE ENCOUNTER
Medical Nutrition Therapy Note    Nutritional Assessment:  IBW: Ideal body weight: 130 lb 11.7 oz (59.3 kg)  Adjusted ideal body weight: 196 lb 8.3 oz (89.1 kg)   UBW: unk  Wt hx:   Wt Readings from Last 20 Encounters:   05/24/21 295 lb 3.2 oz (133.9 kg)   04/06/21 287 lb (130.2 kg)   03/31/21 290 lb 2 oz (131.6 kg)   03/31/21 290 lb 2 oz (131.6 kg)   03/04/21 287 lb (130.2 kg)   11/04/20 287 lb (130.2 kg)   04/30/19 282 lb (127.9 kg)   09/30/13 260 lb (117.9 kg)   09/29/13 260 lb (117.9 kg)   10/24/12 269 lb (122 kg)     Current Ht:   Ht Readings from Last 1 Encounters:   05/24/21 5' 6\" (1.676 m)       Current BMI: Estimated body mass index is 47.65 kg/m² as calculated from the following:    Height as of 5/24/21: 5' 6\" (1.676 m). Weight as of 5/24/21: 295 lb 3.2 oz (133.9 kg). Wt change: 0.4% weight gain since last assessment (per [facility] documented weights)    Estimated nutritional needs:   Calorie Range: 2440   Formula:  MSJ x 1.25  Protein Range: 130-155  Formula: 1.0-1.2 g/kg  Fluid Needs: 1 mL/kcal    Nutrition-impact symptoms: None    05/20/21: Pt requested meeting with writer. Pt asked why she's gaining weight. Pt endorses alcohol and smoking cessation after being diagnosed with cancer. Pt reports having a difficult time and being stressed regarding work, bills, family, and cancer. Pt stated that she'll grab chips, sweets, and cookies while at home. Pt acknowledged what foods are calorically dense and that stress is currently a major barrier for her. Pt mentioned that her physical activity level has declined now that she's not working. 05/26/21: Pt reports walking 4x over the past 1 week. Pt mentioned that her doctor prescribed potassium supplement and told her she's prediabetic. Pt reports still eating a lot of sweets, but noted intake decreased a little over the past 1 week.   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 Pt stated that she's been eating less sweets and that she's been reading labels to look for whole grains. Writer answered all pt questions related to last week's educations (glucose & potassium) to her satisfaction. 06/10/21: Pt reports walking 1x over the last week. Pt also reports only eating sweets once over the last week. Pt mentioned that she has long periods of time without eating r/t being out of the house. 06/16/21: Pt endorses meeting her walking goal over the last week. Pt reports being much more physically active recently and has noticed an improvement in her mental status. Pt mentioned that she was more depressed before, but is happier now. Pt continues to follow dietary changes, but continues to struggle with minimizing long periods without any dietary intake while she's out of the house. 06/23/21: Pt denies meeting her walking goal and endorses going long periods without eating over the last week related to pain from her skin. Pt reports wt gain over the last week, but noted her goal is to lose wt. Pt stated that she got some ensure supplements to aid in minimizing time between dietary intake when outside the home. 07/02/21: Pt reports meeting her walking goal over the last week. Pt mentioned that she's drinking ensure supplements when she'd otherwise miss a meal.  07/09/21: Pt reports walking 4x over the past week. Pt stated that she's remaining active within her house on the days that she doesn't walk.  07/15/21: Pt reports exceeding her goal.  07/22/21: Pt denies going to the gym yet. Pt reports helping her aunt and not having time to go. Pt requested that her gym goal be delayed another week d/t helping her aunt. Pt stated that she has kept up with her walking. 08/06/21: Called pt for follow, but no answer so message left for pt to call writer back. 08/09/21: Pt lvm for writer over the weekend, so writer called pt back.  Pt reports that she's still spending too much time at her aunt's house to be able to go to the gym. Pt requested that she change her new goal at this time. Pt stated that she's eating 2 meals per day and needs to go out to get more ensure high protein supplements. 08/20/21: Pt reports meeting both of her goals. Pt stated that she continues to minimize the missing of meals and going long periods of time without PO intake    Nutritional Diagnosis:  poor nutritional quality of life related to food or activity behavior-related difficulty as evidenced by new medical diagnosis (cancer) and recent lifestyle or life changes (quit smoking, quit drinking, & work change)    Nutritional Interventions:   1. Guiding pt with making lifestyle changes  2. Encouraged pt to continue working on lifestyle/nutritional changes    Previously   1. Pt agreed to have oncology social worker speak with her  2. Provided and reviewed \"Blood Glucose Management\" handout  3. Provided and reviewed \"Potassium\" handout  4. Encouraged pt to include protein with all of her meals  5. Encouraged pt to utilize fruit as a way to satisfy sweet tooth, if ever struggling to limit sweet intake  6. Encouraged pt to have food available that doesn't require refrigeration that would be easy to take with her when she leaves the house for most of the day  7. Provided pt with Ensure High Protein sample to trial and coupon to aid in bridging time between dietary intakes    Nutritional Monitoring/Goals:   1. Pt set goal #1: will walk at least 3x per week - met > 3 weeks, ongoing  2. Pt set goal #2: will eat at least 1 fruit or vegetable every morning - met 1 week, ongoing    Initial consult per other: patient self-referral    Cancer Dx: No matching staging information was found for the patient. Cancer Staging  No matching staging information was found for the patient. Treatment Plan: No flowsheet data found. +   Radiation Treatments     No radiation treatments to show.  (Treatments may have been administered in another system.)          Lab Results   Component Value Date/Time    Sodium 142 06/07/2021 10:58 AM    Sodium 140 05/24/2021 11:09 AM    Sodium 141 03/24/2021 02:39 PM    Sodium 143 04/30/2019 11:45 AM    Sodium 140 09/30/2013 05:27 PM    Potassium 4.0 06/07/2021 10:58 AM    Chloride 102 06/07/2021 10:58 AM    Chloride 104 05/24/2021 11:09 AM    Chloride 107 03/24/2021 02:39 PM    Chloride 107 04/30/2019 11:45 AM    Chloride 106 09/30/2013 05:27 PM    CO2 20 06/07/2021 10:58 AM    CO2 31 05/24/2021 11:09 AM    CO2 31 03/24/2021 02:39 PM    CO2 31 04/30/2019 11:45 AM    CO2 28 09/30/2013 05:27 PM    Anion gap 5 05/24/2021 11:09 AM    Anion gap 4 (L) 03/24/2021 02:39 PM    Anion gap 5 04/30/2019 11:45 AM    Anion gap 6 09/30/2013 05:27 PM    Anion gap 10 03/31/2010 05:32 PM    Glucose 102 (H) 06/07/2021 10:58 AM    Glucose 108 (H) 05/24/2021 11:09 AM    Glucose 133 (H) 03/24/2021 02:39 PM    Glucose 88 04/30/2019 11:45 AM    Glucose 84 09/30/2013 05:27 PM    BUN 10 06/07/2021 10:58 AM    BUN 21 (H) 05/24/2021 11:09 AM    BUN 12 03/24/2021 02:39 PM    BUN 11 04/30/2019 11:45 AM    BUN 9 09/30/2013 05:27 PM    Creatinine 0.74 06/07/2021 10:58 AM    Creatinine 0.86 05/24/2021 11:09 AM    Creatinine 0.8 03/24/2021 02:39 PM    Creatinine 0.86 04/30/2019 11:45 AM    Creatinine 0.86 09/30/2013 05:27 PM    BUN/Creatinine ratio 14 06/07/2021 10:58 AM    BUN/Creatinine ratio 24 (H) 05/24/2021 11:09 AM    BUN/Creatinine ratio 13 04/30/2019 11:45 AM    BUN/Creatinine ratio 10 (L) 09/30/2013 05:27 PM    BUN/Creatinine ratio 21 (H) 03/31/2010 05:32 PM    GFR est  06/07/2021 10:58 AM    GFR est AA >60 05/24/2021 11:09 AM    GFR est AA >60.0 03/24/2021 02:39 PM    GFR est AA >60 04/30/2019 11:45 AM    GFR est AA >60 09/30/2013 05:27 PM    GFR est non-AA 93 06/07/2021 10:58 AM    GFR est non-AA >60 05/24/2021 11:09 AM    GFR est non-AA >60 03/24/2021 02:39 PM    GFR est non-AA >60 04/30/2019 11:45 AM    GFR est non-AA >60 09/30/2013 05:27 PM    Calcium 10.1 06/07/2021 10:58 AM    Calcium 9.4 05/24/2021 11:09 AM    Calcium 9.9 03/24/2021 02:39 PM    Calcium 10.3 (H) 04/30/2019 11:45 AM    Calcium 8.9 09/30/2013 05:27 PM         Current Outpatient Medications:     amLODIPine (NORVASC) 5 mg tablet, Take 1 Tablet by mouth daily. Indications: high blood pressure, Disp: 90 Tablet, Rfl: 1    spironolactone (ALDACTONE) 25 mg tablet, Take 1 Tablet by mouth daily. , Disp: 90 Tablet, Rfl: 1    OTHER, Cancer medication daily, Disp: , Rfl:     cholecalciferol, vitamin D3, (Vitamin D3) 50 mcg (2,000 unit) tab, Take 1 Tab by mouth daily. , Disp: , Rfl:     rosuvastatin (CRESTOR) 10 mg tablet, Take 10 mg by mouth nightly., Disp: , Rfl:     Diet/po intake: 2 meals per day   -morning = eggs, sausage, and coffee   -evening = meat, starch, and vegetable

## 2021-08-26 ENCOUNTER — TELEPHONE (OUTPATIENT)
Dept: RADIATION THERAPY | Age: 56
End: 2021-08-26

## 2021-08-26 NOTE — TELEPHONE ENCOUNTER
Medical Nutrition Therapy Note    Nutritional Assessment:  IBW: Ideal body weight: 130 lb 11.7 oz (59.3 kg)  Adjusted ideal body weight: 196 lb 8.3 oz (89.1 kg)   UBW: unk  Wt hx:   Wt Readings from Last 20 Encounters:   05/24/21 295 lb 3.2 oz (133.9 kg)   04/06/21 287 lb (130.2 kg)   03/31/21 290 lb 2 oz (131.6 kg)   03/31/21 290 lb 2 oz (131.6 kg)   03/04/21 287 lb (130.2 kg)   11/04/20 287 lb (130.2 kg)   04/30/19 282 lb (127.9 kg)   09/30/13 260 lb (117.9 kg)   09/29/13 260 lb (117.9 kg)   10/24/12 269 lb (122 kg)     Current Ht:   Ht Readings from Last 1 Encounters:   05/24/21 5' 6\" (1.676 m)       Current BMI: Estimated body mass index is 47.65 kg/m² as calculated from the following:    Height as of 5/24/21: 5' 6\" (1.676 m). Weight as of 5/24/21: 295 lb 3.2 oz (133.9 kg). Wt change: 0.4% weight gain since last assessment (per [facility] documented weights)    Estimated nutritional needs:   Calorie Range: 2440   Formula:  MSJ x 1.25  Protein Range: 130-155  Formula: 1.0-1.2 g/kg  Fluid Needs: 1 mL/kcal    Nutrition-impact symptoms: None    05/20/21: Pt requested meeting with writer. Pt asked why she's gaining weight. Pt endorses alcohol and smoking cessation after being diagnosed with cancer. Pt reports having a difficult time and being stressed regarding work, bills, family, and cancer. Pt stated that she'll grab chips, sweets, and cookies while at home. Pt acknowledged what foods are calorically dense and that stress is currently a major barrier for her. Pt mentioned that her physical activity level has declined now that she's not working. 05/26/21: Pt reports walking 4x over the past 1 week. Pt mentioned that her doctor prescribed potassium supplement and told her she's prediabetic. Pt reports still eating a lot of sweets, but noted intake decreased a little over the past 1 week.   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 Pt stated that she's been eating less sweets and that she's been reading labels to look for whole grains. Writer answered all pt questions related to last week's educations (glucose & potassium) to her satisfaction. 06/10/21: Pt reports walking 1x over the last week. Pt also reports only eating sweets once over the last week. Pt mentioned that she has long periods of time without eating r/t being out of the house. 06/16/21: Pt endorses meeting her walking goal over the last week. Pt reports being much more physically active recently and has noticed an improvement in her mental status. Pt mentioned that she was more depressed before, but is happier now. Pt continues to follow dietary changes, but continues to struggle with minimizing long periods without any dietary intake while she's out of the house. 06/23/21: Pt denies meeting her walking goal and endorses going long periods without eating over the last week related to pain from her skin. Pt reports wt gain over the last week, but noted her goal is to lose wt. Pt stated that she got some ensure supplements to aid in minimizing time between dietary intake when outside the home. 07/02/21: Pt reports meeting her walking goal over the last week. Pt mentioned that she's drinking ensure supplements when she'd otherwise miss a meal.  07/09/21: Pt reports walking 4x over the past week. Pt stated that she's remaining active within her house on the days that she doesn't walk.  07/15/21: Pt reports exceeding her goal.  07/22/21: Pt denies going to the gym yet. Pt reports helping her aunt and not having time to go. Pt requested that her gym goal be delayed another week d/t helping her aunt. Pt stated that she has kept up with her walking. 08/06/21: Called pt for follow, but no answer so message left for pt to call writer back. 08/09/21: Pt lvm for writer over the weekend, so writer called pt back.  Pt reports that she's still spending too much time at her aunt's house to be able to go to the gym. Pt requested that she change her new goal at this time. Pt stated that she's eating 2 meals per day and needs to go out to get more ensure high protein supplements. 08/20/21: Pt reports meeting both of her goals. Pt stated that she continues to minimize the missing of meals and going long periods of time without PO intake  08/26/21: Called pt for follow up, but no answer so message left for pt to call writer back. Nutritional Diagnosis:  poor nutritional quality of life related to food or activity behavior-related difficulty as evidenced by new medical diagnosis (cancer) and recent lifestyle or life changes (quit smoking, quit drinking, & work change)    Nutritional Interventions:   1. Guiding pt with making lifestyle changes  2. Encouraged pt to continue working on lifestyle/nutritional changes    Previously   1. Pt agreed to have oncology social worker speak with her  2. Provided and reviewed \"Blood Glucose Management\" handout  3. Provided and reviewed \"Potassium\" handout  4. Encouraged pt to include protein with all of her meals  5. Encouraged pt to utilize fruit as a way to satisfy sweet tooth, if ever struggling to limit sweet intake  6. Encouraged pt to have food available that doesn't require refrigeration that would be easy to take with her when she leaves the house for most of the day  7. Provided pt with Ensure High Protein sample to trial and coupon to aid in bridging time between dietary intakes    Nutritional Monitoring/Goals:   1. Pt set goal #1: will walk at least 3x per week - met > 3 weeks, ongoing  2. Pt set goal #2: will eat at least 1 fruit or vegetable every morning - met 1 week, ongoing    Initial consult per other: patient self-referral    Cancer Dx: No matching staging information was found for the patient. Cancer Staging  No matching staging information was found for the patient. Treatment Plan: No flowsheet data found.  + Radiation Treatments     No radiation treatments to show.  (Treatments may have been administered in another system.)          Lab Results   Component Value Date/Time    Sodium 142 06/07/2021 10:58 AM    Sodium 140 05/24/2021 11:09 AM    Sodium 141 03/24/2021 02:39 PM    Sodium 143 04/30/2019 11:45 AM    Sodium 140 09/30/2013 05:27 PM    Potassium 4.0 06/07/2021 10:58 AM    Chloride 102 06/07/2021 10:58 AM    Chloride 104 05/24/2021 11:09 AM    Chloride 107 03/24/2021 02:39 PM    Chloride 107 04/30/2019 11:45 AM    Chloride 106 09/30/2013 05:27 PM    CO2 20 06/07/2021 10:58 AM    CO2 31 05/24/2021 11:09 AM    CO2 31 03/24/2021 02:39 PM    CO2 31 04/30/2019 11:45 AM    CO2 28 09/30/2013 05:27 PM    Anion gap 5 05/24/2021 11:09 AM    Anion gap 4 (L) 03/24/2021 02:39 PM    Anion gap 5 04/30/2019 11:45 AM    Anion gap 6 09/30/2013 05:27 PM    Anion gap 10 03/31/2010 05:32 PM    Glucose 102 (H) 06/07/2021 10:58 AM    Glucose 108 (H) 05/24/2021 11:09 AM    Glucose 133 (H) 03/24/2021 02:39 PM    Glucose 88 04/30/2019 11:45 AM    Glucose 84 09/30/2013 05:27 PM    BUN 10 06/07/2021 10:58 AM    BUN 21 (H) 05/24/2021 11:09 AM    BUN 12 03/24/2021 02:39 PM    BUN 11 04/30/2019 11:45 AM    BUN 9 09/30/2013 05:27 PM    Creatinine 0.74 06/07/2021 10:58 AM    Creatinine 0.86 05/24/2021 11:09 AM    Creatinine 0.8 03/24/2021 02:39 PM    Creatinine 0.86 04/30/2019 11:45 AM    Creatinine 0.86 09/30/2013 05:27 PM    BUN/Creatinine ratio 14 06/07/2021 10:58 AM    BUN/Creatinine ratio 24 (H) 05/24/2021 11:09 AM    BUN/Creatinine ratio 13 04/30/2019 11:45 AM    BUN/Creatinine ratio 10 (L) 09/30/2013 05:27 PM    BUN/Creatinine ratio 21 (H) 03/31/2010 05:32 PM    GFR est  06/07/2021 10:58 AM    GFR est AA >60 05/24/2021 11:09 AM    GFR est AA >60.0 03/24/2021 02:39 PM    GFR est AA >60 04/30/2019 11:45 AM    GFR est AA >60 09/30/2013 05:27 PM    GFR est non-AA 93 06/07/2021 10:58 AM    GFR est non-AA >60 05/24/2021 11:09 AM    GFR est non-AA >60 03/24/2021 02:39 PM    GFR est non-AA >60 04/30/2019 11:45 AM    GFR est non-AA >60 09/30/2013 05:27 PM    Calcium 10.1 06/07/2021 10:58 AM    Calcium 9.4 05/24/2021 11:09 AM    Calcium 9.9 03/24/2021 02:39 PM    Calcium 10.3 (H) 04/30/2019 11:45 AM    Calcium 8.9 09/30/2013 05:27 PM         Current Outpatient Medications:     amLODIPine (NORVASC) 5 mg tablet, Take 1 Tablet by mouth daily. Indications: high blood pressure, Disp: 90 Tablet, Rfl: 1    spironolactone (ALDACTONE) 25 mg tablet, Take 1 Tablet by mouth daily. , Disp: 90 Tablet, Rfl: 1    OTHER, Cancer medication daily, Disp: , Rfl:     cholecalciferol, vitamin D3, (Vitamin D3) 50 mcg (2,000 unit) tab, Take 1 Tab by mouth daily. , Disp: , Rfl:     rosuvastatin (CRESTOR) 10 mg tablet, Take 10 mg by mouth nightly., Disp: , Rfl:     Diet/po intake: 2 meals per day   -morning = eggs, sausage, and coffee   -evening = meat, starch, and vegetable

## 2021-09-02 ENCOUNTER — TRANSCRIBE ORDER (OUTPATIENT)
Dept: SCHEDULING | Age: 56
End: 2021-09-02

## 2021-09-02 DIAGNOSIS — C50.811 MALIGNANT NEOPLASM OF OVERLAPPING SITES OF RIGHT FEMALE BREAST (HCC): Primary | ICD-10-CM

## 2021-09-04 ENCOUNTER — HOSPITAL ENCOUNTER (EMERGENCY)
Age: 56
Discharge: HOME | End: 2021-09-04
Attending: STUDENT IN AN ORGANIZED HEALTH CARE EDUCATION/TRAINING PROGRAM
Payer: COMMERCIAL

## 2021-09-04 VITALS
DIASTOLIC BLOOD PRESSURE: 80 MMHG | HEART RATE: 109 BPM | TEMPERATURE: 99.3 F | WEIGHT: 293 LBS | HEIGHT: 66 IN | SYSTOLIC BLOOD PRESSURE: 145 MMHG | BODY MASS INDEX: 47.09 KG/M2 | RESPIRATION RATE: 18 BRPM | OXYGEN SATURATION: 98 %

## 2021-09-04 DIAGNOSIS — K08.89 PAIN, DENTAL: Primary | ICD-10-CM

## 2021-09-04 PROCEDURE — 99282 EMERGENCY DEPT VISIT SF MDM: CPT

## 2021-09-04 RX ORDER — AMOXICILLIN AND CLAVULANATE POTASSIUM 875; 125 MG/1; MG/1
1 TABLET, FILM COATED ORAL 2 TIMES DAILY
Qty: 14 TABLET | Refills: 0 | Status: SHIPPED | OUTPATIENT
Start: 2021-09-04 | End: 2021-09-11

## 2021-09-04 RX ORDER — HYDROCODONE BITARTRATE AND ACETAMINOPHEN 5; 325 MG/1; MG/1
1 TABLET ORAL
Qty: 10 TABLET | Refills: 0 | Status: SHIPPED | OUTPATIENT
Start: 2021-09-04 | End: 2021-09-07

## 2021-09-04 NOTE — ED PROVIDER NOTES
EMERGENCY DEPARTMENT HISTORY AND PHYSICAL EXAM    I have evaluated the patient at 8:59 AM      Date: 9/4/2021  Patient Name: Og Dillard    History of Presenting Illness     No chief complaint on file. History Provided By: Patient  Location/Duration/Severity/Modifying factors   16-year-old female presenting to the emergency department with complaints of left-sided dental pain and minor jaw swelling. Patient states that has been going on for the past week. She has been taking aspirin and Tylenol at home which was effective initially but not anymore. She has not made a dental appointment yet. She denies any fevers or chills, trouble swallowing, trouble breathing, chest pain or shortness of breath. PCP: Michael Hernandez DNP    Current Outpatient Medications   Medication Sig Dispense Refill    HYDROcodone-acetaminophen (Norco) 5-325 mg per tablet Take 1 Tablet by mouth every six (6) hours as needed for Pain for up to 3 days. Max Daily Amount: 4 Tablets. 10 Tablet 0    amoxicillin-clavulanate (Augmentin) 875-125 mg per tablet Take 1 Tablet by mouth two (2) times a day for 7 days. 14 Tablet 0    amLODIPine (NORVASC) 5 mg tablet Take 1 Tablet by mouth daily. Indications: high blood pressure 90 Tablet 1    spironolactone (ALDACTONE) 25 mg tablet Take 1 Tablet by mouth daily. 90 Tablet 1    OTHER Cancer medication daily      cholecalciferol, vitamin D3, (Vitamin D3) 50 mcg (2,000 unit) tab Take 1 Tab by mouth daily.  rosuvastatin (CRESTOR) 10 mg tablet Take 10 mg by mouth nightly.          Past History     Past Medical History:  Past Medical History:   Diagnosis Date    Ankle swelling 5/24/2021    Arthritis     Cancer ([address]     Essential hypertension 5/24/2021    High cholesterol     Hypertension     Malignant neoplasm of right female breast ([address] 3/31/2021    Mixed hyperlipidemia 5/24/2021    Obese body habitus 5/24/2021    Sleep apnea     CPAP    Stress 5/24/2021       Past Surgical History:  Past Surgical History:   Procedure Laterality Date    HX BILATERAL MASTECTOMY Bilateral 2021    HX BREAST BIOPSY Right     2020    HX BREAST LUMPECTOMY Left 2021    EXPLORATION LEFT MASTECTOMY AND EVACUATION OF HEMATOMA performed by Allison Lima MD at [address] HX MASTECTOMY Bilateral 3/31/2021    BILATERAL MASTECTOMY; LEFT SENTINEL NODE BIOPSY; RIGHT AXILLARY DESSECTION [facility] MA[facility] OR    HX VASCULAR ACCESS      IR INSERT TUNL CVC W PORT OVER 5 YEARS  2020       Family History:  Family History   Problem Relation Age of Onset    Diabetes Mother     Hypertension Father        Social History:  Social History     Tobacco Use    Smoking status: Former Smoker     Packs/day: 0.50     Years: 2.00     Pack years: 1.00     Quit date: 10/30/2020     Years since quittin.8    Smokeless tobacco: Never Used   Substance Use Topics    Alcohol use: Not Currently    Drug use: Never       Allergies:  No Known Allergies      Review of Systems       Review of Systems   Constitutional: Negative for activity change, chills, diaphoresis, fatigue and fever. HENT: Positive for dental problem and facial swelling. Negative for congestion, ear pain, rhinorrhea, sinus pain, sore throat, trouble swallowing and voice change. Respiratory: Negative for cough, chest tightness, shortness of breath, wheezing and stridor. Cardiovascular: Negative for chest pain and palpitations. Gastrointestinal: Negative for abdominal distention, abdominal pain, constipation, diarrhea, nausea and vomiting. Endocrine: Negative for polyuria. Neurological: Negative for dizziness, weakness and headaches. Psychiatric/Behavioral: Negative for agitation. The patient is not nervous/anxious.           Physical Exam     Visit Vitals  BP (!) 145/80 (BP 1 Location: Left upper arm, BP Patient Position: At rest)   Pulse (!) 109   Temp 99.3 °F (37.4 °C)   Resp 18   Ht 5' 6\" (1.676 m)   Wt 133.4 kg (294 lb)   SpO2 98%   BMI 47.45 kg/m²         Physical Exam  Constitutional:       General: She is not in acute distress. Appearance: She is not toxic-appearing. HENT:      Head: Normocephalic and atraumatic. Mouth/Throat:      Mouth: Mucous membranes are moist.      Pharynx: No oropharyngeal exudate or posterior oropharyngeal erythema. Comments: Multiple dental caries throughout. No obvious fluctuance abscesses. Minor tenderness to palpation over the left maxilla. Cardiovascular:      Rate and Rhythm: Normal rate and regular rhythm. Heart sounds: Normal heart sounds. No murmur heard. No friction rub. No gallop. Pulmonary:      Effort: Pulmonary effort is normal.      Breath sounds: Normal breath sounds. Abdominal:      General: There is no distension. Palpations: Abdomen is soft. There is no mass. Tenderness: There is no abdominal tenderness. There is no guarding. Hernia: No hernia is present. Musculoskeletal:      Cervical back: Normal range of motion and neck supple. Skin:     General: Skin is warm and dry. Findings: No rash. Neurological:      General: No focal deficit present. Mental Status: She is alert and oriented to person, place, and time. Psychiatric:         Mood and Affect: Mood normal.           Diagnostic Study Results     Labs -  No results found for this or any previous visit (from the past 12 hour(s)). Radiologic Studies -   No orders to display         Medical Decision Making   I am the first provider for this patient. I reviewed the vital signs, available nursing notes, past medical history, past surgical history, family history and social history. Vital Signs-Reviewed the patient's vital signs.     Records Reviewed: Nursing Notes (Time of Review: 8:59 AM)    ED Course: Progress Notes, Reevaluation, and Consults:         Provider Notes (Medical Decision Making):   MDM  Number of Diagnoses or Management Options  Pain, dental  Diagnosis management comments: Patient presenting to the emergency department for evaluation of dental pain. No trismus. Managing oral secretions without complication. Vital signs stable. Afebrile. Will provide her with analgesia here. I have instructed her to follow-up with her dentist.  She has been given ED return precautions. Given her swelling we will also prescribe her a course of Augmentin. Patient verbalizes understanding agreement plan. Diagnosis     Clinical Impression:   1. Pain, dental        Disposition: home    Follow-up Information     Follow up With Specialties Details Why Contact [facility] EMERGENCY DEPT Emergency Medicine  As needed, If symptoms worsen [address] [facility]  [phone]    Akosua Rodriguez Nurse Practitioner Call   [address]  [phone]      your dentist    ASAP           Patient's Medications   Start Taking    AMOXICILLIN-CLAVULANATE (AUGMENTIN) 875-125 MG PER TABLET    Take 1 Tablet by mouth two (2) times a day for 7 days. HYDROCODONE-ACETAMINOPHEN (NORCO) 5-325 MG PER TABLET    Take 1 Tablet by mouth every six (6) hours as needed for Pain for up to 3 days. Max Daily Amount: 4 Tablets. Continue Taking    AMLODIPINE (NORVASC) 5 MG TABLET    Take 1 Tablet by mouth daily. Indications: high blood pressure    CHOLECALCIFEROL, VITAMIN D3, (VITAMIN D3) 50 MCG (2,000 UNIT) TAB    Take 1 Tab by mouth daily. OTHER    Cancer medication daily    ROSUVASTATIN (CRESTOR) 10 MG TABLET    Take 10 mg by mouth nightly. SPIRONOLACTONE (ALDACTONE) 25 MG TABLET    Take 1 Tablet by mouth daily. These Medications have changed    No medications on file   Stop Taking    No medications on file     Disclaimer: Sections of this note are dictated using utilizing voice recognition software. Minor typographical errors may be present.  If questions arise, please do not hesitate to contact me or call our department.

## 2021-09-04 NOTE — ED TRIAGE NOTES
Dental pain and jaw swelling, left side. S/s began last week. Improves with ASA but no longer working as well.

## 2021-09-10 ENCOUNTER — HOSPITAL ENCOUNTER (OUTPATIENT)
Dept: NUCLEAR MEDICINE | Age: 56
Discharge: HOME | End: 2021-09-10
Attending: PHYSICIAN ASSISTANT
Payer: COMMERCIAL

## 2021-09-10 DIAGNOSIS — C50.811 MALIGNANT NEOPLASM OF OVERLAPPING SITES OF RIGHT FEMALE BREAST (HCC): ICD-10-CM

## 2021-09-23 ENCOUNTER — TELEPHONE (OUTPATIENT)
Dept: RADIATION THERAPY | Age: 56
End: 2021-09-23

## 2021-09-23 NOTE — TELEPHONE ENCOUNTER
Medical Nutrition Therapy Note    Nutritional Assessment:  IBW: Ideal body weight: 130 lb 11.7 oz (59.3 kg)  Adjusted ideal body weight: 196 lb 0.6 oz (88.9 kg)   UBW: unk  Wt hx:   Wt Readings from Last 20 Encounters:   09/04/21 294 lb (133.4 kg)   05/24/21 295 lb 3.2 oz (133.9 kg)   04/06/21 287 lb (130.2 kg)   03/31/21 290 lb 2 oz (131.6 kg)   03/31/21 290 lb 2 oz (131.6 kg)   03/04/21 287 lb (130.2 kg)   11/04/20 287 lb (130.2 kg)   04/30/19 282 lb (127.9 kg)   09/30/13 260 lb (117.9 kg)   09/29/13 260 lb (117.9 kg)   10/24/12 269 lb (122 kg)     Current Ht:   Ht Readings from Last 1 Encounters:   09/04/21 5' 6\" (1.676 m)       Current BMI: Estimated body mass index is 47.45 kg/m² as calculated from the following:    Height as of 9/4/21: 5' 6\" (1.676 m). Weight as of 9/4/21: 294 lb (133.4 kg). Wt change: 0.4% weight gain since last assessment (per [facility] documented weights)    Estimated nutritional needs:   Calorie Range: 2440   Formula:  MSJ x 1.25  Protein Range: 130-155  Formula: 1.0-1.2 g/kg  Fluid Needs: 1 mL/kcal    Nutrition-impact symptoms: None    05/20/21: Pt requested meeting with writer. Pt asked why she's gaining weight. Pt endorses alcohol and smoking cessation after being diagnosed with cancer. Pt reports having a difficult time and being stressed regarding work, bills, family, and cancer. Pt stated that she'll grab chips, sweets, and cookies while at home. Pt acknowledged what foods are calorically dense and that stress is currently a major barrier for her. Pt mentioned that her physical activity level has declined now that she's not working. 05/26/21: Pt reports walking 4x over the past 1 week. Pt mentioned that her doctor prescribed potassium supplement and told her she's prediabetic. Pt reports still eating a lot of sweets, but noted intake decreased a little over the past 1 week.   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 Pt stated that she's been eating less sweets and that she's been reading labels to look for whole grains. Writer answered all pt questions related to last week's educations (glucose & potassium) to her satisfaction. 06/10/21: Pt reports walking 1x over the last week. Pt also reports only eating sweets once over the last week. Pt mentioned that she has long periods of time without eating r/t being out of the house. 06/16/21: Pt endorses meeting her walking goal over the last week. Pt reports being much more physically active recently and has noticed an improvement in her mental status. Pt mentioned that she was more depressed before, but is happier now. Pt continues to follow dietary changes, but continues to struggle with minimizing long periods without any dietary intake while she's out of the house. 06/23/21: Pt denies meeting her walking goal and endorses going long periods without eating over the last week related to pain from her skin. Pt reports wt gain over the last week, but noted her goal is to lose wt. Pt stated that she got some ensure supplements to aid in minimizing time between dietary intake when outside the home. 07/02/21: Pt reports meeting her walking goal over the last week. Pt mentioned that she's drinking ensure supplements when she'd otherwise miss a meal.  07/09/21: Pt reports walking 4x over the past week. Pt stated that she's remaining active within her house on the days that she doesn't walk.  07/15/21: Pt reports exceeding her goal.  07/22/21: Pt denies going to the gym yet. Pt reports helping her aunt and not having time to go. Pt requested that her gym goal be delayed another week d/t helping her aunt. Pt stated that she has kept up with her walking. 08/06/21: Called pt for follow, but no answer so message left for pt to call writer back. 08/09/21: Pt lvm for writer over the weekend, so writer called pt back.  Pt reports that she's still spending too much time at her aunt's house to be able to go to the gym. Pt requested that she change her new goal at this time. Pt stated that she's eating 2 meals per day and needs to go out to get more ensure high protein supplements. 08/20/21: Pt reports meeting both of her goals. Pt stated that she continues to minimize the missing of meals and going long periods of time without PO intake  08/26/21: Called pt for follow up, but no answer so message left for pt to call writer back. 09/23/21: Pt denies continuing with nutritional goals at this time, but agreed to contact writer once she's ready to resume. Nutritional Diagnosis:  poor nutritional quality of life related to food or activity behavior-related difficulty as evidenced by new medical diagnosis (cancer) and recent lifestyle or life changes (quit smoking, quit drinking, & work change)    Nutritional Interventions:   1. Encouraged pt to continue working on lifestyle/nutritional changes    Previously   1. Pt agreed to have oncology social worker speak with her  2. Provided and reviewed \"Blood Glucose Management\" handout  3. Provided and reviewed \"Potassium\" handout  4. Encouraged pt to include protein with all of her meals  5. Encouraged pt to utilize fruit as a way to satisfy sweet tooth, if ever struggling to limit sweet intake  6. Encouraged pt to have food available that doesn't require refrigeration that would be easy to take with her when she leaves the house for most of the day  7. Provided pt with Ensure High Protein sample to trial and coupon to aid in bridging time between dietary intakes  8. Guiding pt with making lifestyle changes    Nutritional Monitoring/Goals:   1. Pt set goal #1: will walk at least 3x per week - met > 3 weeks, ongoing  2.  Pt set goal #2: will eat at least 1 fruit or vegetable every morning - met 1 week, ongoing    Initial consult per other: patient self-referral    Cancer Dx: No matching staging information was found for the patient. Cancer Staging  No matching staging information was found for the patient. Treatment Plan: No flowsheet data found. +   Radiation Treatments     No radiation treatments to show.  (Treatments may have been administered in another system.)          Lab Results   Component Value Date/Time    Sodium 142 06/07/2021 10:58 AM    Sodium 140 05/24/2021 11:09 AM    Sodium 141 03/24/2021 02:39 PM    Sodium 143 04/30/2019 11:45 AM    Sodium 140 09/30/2013 05:27 PM    Potassium 4.0 06/07/2021 10:58 AM    Chloride 102 06/07/2021 10:58 AM    Chloride 104 05/24/2021 11:09 AM    Chloride 107 03/24/2021 02:39 PM    Chloride 107 04/30/2019 11:45 AM    Chloride 106 09/30/2013 05:27 PM    CO2 20 06/07/2021 10:58 AM    CO2 31 05/24/2021 11:09 AM    CO2 31 03/24/2021 02:39 PM    CO2 31 04/30/2019 11:45 AM    CO2 28 09/30/2013 05:27 PM    Anion gap 5 05/24/2021 11:09 AM    Anion gap 4 (L) 03/24/2021 02:39 PM    Anion gap 5 04/30/2019 11:45 AM    Anion gap 6 09/30/2013 05:27 PM    Anion gap 10 03/31/2010 05:32 PM    Glucose 102 (H) 06/07/2021 10:58 AM    Glucose 108 (H) 05/24/2021 11:09 AM    Glucose 133 (H) 03/24/2021 02:39 PM    Glucose 88 04/30/2019 11:45 AM    Glucose 84 09/30/2013 05:27 PM    BUN 10 06/07/2021 10:58 AM    BUN 21 (H) 05/24/2021 11:09 AM    BUN 12 03/24/2021 02:39 PM    BUN 11 04/30/2019 11:45 AM    BUN 9 09/30/2013 05:27 PM    Creatinine 0.74 06/07/2021 10:58 AM    Creatinine 0.86 05/24/2021 11:09 AM    Creatinine 0.8 03/24/2021 02:39 PM    Creatinine 0.86 04/30/2019 11:45 AM    Creatinine 0.86 09/30/2013 05:27 PM    BUN/Creatinine ratio 14 06/07/2021 10:58 AM    BUN/Creatinine ratio 24 (H) 05/24/2021 11:09 AM    BUN/Creatinine ratio 13 04/30/2019 11:45 AM    BUN/Creatinine ratio 10 (L) 09/30/2013 05:27 PM    BUN/Creatinine ratio 21 (H) 03/31/2010 05:32 PM    GFR est  06/07/2021 10:58 AM    GFR est AA >60 05/24/2021 11:09 AM    GFR est AA >60.0 03/24/2021 02:39 PM    GFR est AA >60 04/30/2019 11:45 AM    GFR est AA >60 09/30/2013 05:27 PM    GFR est non-AA 93 06/07/2021 10:58 AM    GFR est non-AA >60 05/24/2021 11:09 AM    GFR est non-AA >60 03/24/2021 02:39 PM    GFR est non-AA >60 04/30/2019 11:45 AM    GFR est non-AA >60 09/30/2013 05:27 PM    Calcium 10.1 06/07/2021 10:58 AM    Calcium 9.4 05/24/2021 11:09 AM    Calcium 9.9 03/24/2021 02:39 PM    Calcium 10.3 (H) 04/30/2019 11:45 AM    Calcium 8.9 09/30/2013 05:27 PM         Current Outpatient Medications:     amLODIPine (NORVASC) 5 mg tablet, Take 1 Tablet by mouth daily. Indications: high blood pressure, Disp: 90 Tablet, Rfl: 1    spironolactone (ALDACTONE) 25 mg tablet, Take 1 Tablet by mouth daily. , Disp: 90 Tablet, Rfl: 1    OTHER, Cancer medication daily, Disp: , Rfl:     cholecalciferol, vitamin D3, (Vitamin D3) 50 mcg (2,000 unit) tab, Take 1 Tab by mouth daily. , Disp: , Rfl:     rosuvastatin (CRESTOR) 10 mg tablet, Take 10 mg by mouth nightly., Disp: , Rfl:     Diet/po intake: 2 meals per day   -morning = eggs, sausage, and coffee   -evening = meat, starch, and vegetable

## 2021-10-20 ENCOUNTER — TELEPHONE (OUTPATIENT)
Dept: INTERNAL MEDICINE CLINIC | Age: 56
End: 2021-10-20

## 2021-10-20 DIAGNOSIS — Z12.11 COLON CANCER SCREENING: Primary | ICD-10-CM

## 2021-10-20 NOTE — TELEPHONE ENCOUNTER
Patient was referred to Dr. Kaya Lawson for a colonoscopy but he doesn't take her insurance. She currently only has [facility] through [place]. She needs a referral to a different doctor.

## 2021-10-20 NOTE — TELEPHONE ENCOUNTER
Patient reached and informed that she needs to contact her insurance company to inquire what GI offices participate with her insurance since she does not want to be seen at [facility]. Patient verbalized understanding.

## 2021-10-23 DIAGNOSIS — I10 ESSENTIAL HYPERTENSION: ICD-10-CM

## 2021-10-23 DIAGNOSIS — E87.6 HYPOKALEMIA: ICD-10-CM

## 2021-10-24 RX ORDER — SPIRONOLACTONE 25 MG/1
25 TABLET ORAL DAILY
Qty: 90 TABLET | Refills: 1 | OUTPATIENT
Start: 2021-10-24

## 2021-10-24 NOTE — TELEPHONE ENCOUNTER
Will refill at 11/24/21 appt.     Requested Prescriptions     Refused Prescriptions Disp Refills    spironolactone (ALDACTONE) 25 mg tablet [Pharmacy Med Name: SPIRONOLACTONE 25 MG TABLET] 90 Tablet 1     Sig: TAKE 1 TABLET BY MOUTH DAILY     Refused By: Mary Lou James     Reason for Refusal: Refill not appropriate

## 2021-10-26 ENCOUNTER — TRANSCRIBE ORDER (OUTPATIENT)
Dept: SCHEDULING | Age: 56
End: 2021-10-26

## 2021-10-26 DIAGNOSIS — C50.811 MALIGNANT NEOPLASM OF OVERLAPPING SITES OF RIGHT FEMALE BREAST (HCC): Primary | ICD-10-CM

## 2021-11-15 NOTE — TELEPHONE ENCOUNTER
Patient does not want to be seen at [facility]. Please place new referral for colon cancer screening and she will have to see to Radames Rodriguez.

## 2021-11-15 NOTE — TELEPHONE ENCOUNTER
Pt calling, says her insurance will cover GI for [facility]. Says she wants referral but in for the group next to the ER. She didn't know name of doctor.

## 2021-11-16 ENCOUNTER — APPOINTMENT (OUTPATIENT)
Dept: INTERNAL MEDICINE CLINIC | Age: 56
End: 2021-11-16

## 2021-11-16 ENCOUNTER — HOSPITAL ENCOUNTER (OUTPATIENT)
Dept: LAB | Age: 56
Discharge: HOME | End: 2021-11-16
Payer: COMMERCIAL

## 2021-11-16 ENCOUNTER — HOSPITAL ENCOUNTER (OUTPATIENT)
Dept: CT IMAGING | Age: 56
Discharge: HOME | End: 2021-11-16
Attending: PHYSICIAN ASSISTANT
Payer: COMMERCIAL

## 2021-11-16 DIAGNOSIS — C50.811 MALIGNANT NEOPLASM OF OVERLAPPING SITES OF RIGHT FEMALE BREAST (HCC): ICD-10-CM

## 2021-11-16 DIAGNOSIS — E78.2 MIXED HYPERLIPIDEMIA: ICD-10-CM

## 2021-11-16 DIAGNOSIS — E87.6 HYPOKALEMIA: ICD-10-CM

## 2021-11-16 DIAGNOSIS — I10 ESSENTIAL HYPERTENSION: ICD-10-CM

## 2021-11-16 DIAGNOSIS — R73.03 PREDIABETES: ICD-10-CM

## 2021-11-16 LAB
ALBUMIN SERPL-MCNC: 3.7 G/DL (ref 3.4–5)
ALBUMIN/GLOB SERPL: 0.9 {RATIO} (ref 0.8–1.7)
ALP SERPL-CCNC: 83 U/L (ref 45–117)
ALT SERPL-CCNC: 34 U/L (ref 13–56)
ANION GAP SERPL CALC-SCNC: 7 MMOL/L (ref 3–18)
AST SERPL-CCNC: 27 U/L (ref 10–38)
BILIRUB SERPL-MCNC: 0.7 MG/DL (ref 0.2–1)
BUN SERPL-MCNC: 22 MG/DL (ref 7–18)
BUN/CREAT SERPL: 22 (ref 12–20)
CALCIUM SERPL-MCNC: 9.9 MG/DL (ref 8.5–10.1)
CHLORIDE SERPL-SCNC: 105 MMOL/L (ref 100–111)
CHOLEST SERPL-MCNC: 163 MG/DL
CO2 SERPL-SCNC: 27 MMOL/L (ref 21–32)
CREAT SERPL-MCNC: 1.01 MG/DL (ref 0.6–1.3)
CREAT UR-MCNC: 0.9 MG/DL (ref 0.6–1.3)
EST. AVERAGE GLUCOSE BLD GHB EST-MCNC: 137 MG/DL
GLOBULIN SER CALC-MCNC: 4 G/DL (ref 2–4)
GLUCOSE SERPL-MCNC: 110 MG/DL (ref 74–99)
HBA1C MFR BLD: 6.4 % (ref 4.2–5.6)
HDLC SERPL-MCNC: 42 MG/DL (ref 40–60)
HDLC SERPL: 3.9 {RATIO} (ref 0–5)
LDLC SERPL CALC-MCNC: 100.8 MG/DL (ref 0–100)
LIPID PROFILE,FLP: ABNORMAL
POTASSIUM SERPL-SCNC: 4 MMOL/L (ref 3.5–5.5)
PROT SERPL-MCNC: 7.7 G/DL (ref 6.4–8.2)
SODIUM SERPL-SCNC: 139 MMOL/L (ref 136–145)
TRIGL SERPL-MCNC: 101 MG/DL (ref ?–150)
VLDLC SERPL CALC-MCNC: 20.2 MG/DL

## 2021-11-16 PROCEDURE — 82565 ASSAY OF CREATININE: CPT

## 2021-11-16 PROCEDURE — 80053 COMPREHEN METABOLIC PANEL: CPT

## 2021-11-16 PROCEDURE — 74011000636 HC RX REV CODE- 636

## 2021-11-16 PROCEDURE — 80061 LIPID PANEL: CPT

## 2021-11-16 PROCEDURE — 83036 HEMOGLOBIN GLYCOSYLATED A1C: CPT

## 2021-11-16 PROCEDURE — 74177 CT ABD & PELVIS W/CONTRAST: CPT

## 2021-11-16 PROCEDURE — 36415 COLL VENOUS BLD VENIPUNCTURE: CPT

## 2021-11-16 RX ADMIN — IOPAMIDOL 100 ML: 612 INJECTION, SOLUTION INTRAVENOUS at 11:36

## 2021-11-17 ENCOUNTER — DOCUMENTATION ONLY (OUTPATIENT)
Dept: INTERNAL MEDICINE CLINIC | Age: 56
End: 2021-11-17

## 2021-11-17 NOTE — PROGRESS NOTES
Received request from [facility] for mastectomy prosthesis. Order signed and faxed to [phone]. See scanned media.

## 2021-11-24 ENCOUNTER — OFFICE VISIT (OUTPATIENT)
Dept: INTERNAL MEDICINE CLINIC | Age: 56
End: 2021-11-24
Payer: COMMERCIAL

## 2021-11-24 VITALS
TEMPERATURE: 96.8 F | SYSTOLIC BLOOD PRESSURE: 122 MMHG | HEART RATE: 104 BPM | BODY MASS INDEX: 47.09 KG/M2 | OXYGEN SATURATION: 98 % | RESPIRATION RATE: 16 BRPM | HEIGHT: 66 IN | WEIGHT: 293 LBS | DIASTOLIC BLOOD PRESSURE: 66 MMHG

## 2021-11-24 DIAGNOSIS — E87.6 HYPOKALEMIA: ICD-10-CM

## 2021-11-24 DIAGNOSIS — Z12.4 CERVICAL CANCER SCREENING: ICD-10-CM

## 2021-11-24 DIAGNOSIS — R73.03 PREDIABETES: ICD-10-CM

## 2021-11-24 DIAGNOSIS — C50.011 MALIGNANT NEOPLASM OF NIPPLE OF RIGHT BREAST IN FEMALE, UNSPECIFIED ESTROGEN RECEPTOR STATUS (HCC): ICD-10-CM

## 2021-11-24 DIAGNOSIS — F43.9 STRESS: ICD-10-CM

## 2021-11-24 DIAGNOSIS — E78.2 MIXED HYPERLIPIDEMIA: ICD-10-CM

## 2021-11-24 DIAGNOSIS — I10 ESSENTIAL HYPERTENSION: Primary | ICD-10-CM

## 2021-11-24 DIAGNOSIS — E66.9 OBESE BODY HABITUS: ICD-10-CM

## 2021-11-24 PROCEDURE — 99214 OFFICE O/P EST MOD 30 MIN: CPT | Performed by: NURSE PRACTITIONER

## 2021-11-24 RX ORDER — LETROZOLE 2.5 MG/1
2.5 TABLET, FILM COATED ORAL DAILY
COMMUNITY
Start: 2021-11-01

## 2021-11-24 RX ORDER — AMLODIPINE BESYLATE 5 MG/1
5 TABLET ORAL DAILY
Qty: 90 TABLET | Refills: 1 | Status: SHIPPED | OUTPATIENT
Start: 2021-11-24 | End: 2022-05-20 | Stop reason: SDUPTHER

## 2021-11-24 RX ORDER — ROSUVASTATIN CALCIUM 10 MG/1
10 TABLET, COATED ORAL
Qty: 90 TABLET | Refills: 1 | Status: SHIPPED | OUTPATIENT
Start: 2021-11-24 | End: 2022-05-20 | Stop reason: SDUPTHER

## 2021-11-24 RX ORDER — CHLORHEXIDINE GLUCONATE 1.2 MG/ML
RINSE ORAL
COMMUNITY
Start: 2021-11-05 | End: 2022-06-30

## 2021-11-24 RX ORDER — SPIRONOLACTONE 25 MG/1
25 TABLET ORAL DAILY
Qty: 90 TABLET | Refills: 1 | Status: SHIPPED | OUTPATIENT
Start: 2021-11-24 | End: 2022-05-20 | Stop reason: SDUPTHER

## 2021-11-24 NOTE — PROGRESS NOTES
[facility] [id number]  [place], [address]  [phone] [facility]/[phone] fax    11/24/2021    HPI:   Wayne Grant 1965 is a pleasant BLACK/ female who presents today for follow-up of hypertension, cholesterol, stress, right breast cancer, s/p bilateral mastectomy. Todays concern:  1. Stress. Has not contacted psychiatry but is still interested in doing so. 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 Her [facility] time was approved but they would not approve her personal time off. Hypertension: taking Aldactone and amlodipine. Tolerating well     Cholesterol: Continue statin therapy without side effects or complications. Stress: She works in housekeeping at [employer]. She is on light duty but work wants her to return full duty. She is not ready for this. Due to illness and work, she is under some stress with depression but does not feel she needs intervention at this time. Right breast cancer: DX Sept 2020. Underwent bilateral breast surgery (mastectomy) with Dr Lorri Sears. She was noted to have + lymph nodes to both breasts. She completed chemo back in June 2020. She continues Femara treatment under Dr. Mino Williamson. HM: she recieved her Covid vaccines. She is due for her booster. She received her influenza vaccine earlier this week. She is still thinking about whether or not she wants the PNA vaccine.     Past Medical History:   Diagnosis Date    Ankle swelling 5/24/2021    Arthritis     Cancer ([address]     Essential hypertension 5/24/2021    High cholesterol     Hypertension     Malignant neoplasm of right female breast ([address] 3/31/2021    Mixed hyperlipidemia 5/24/2021    Obese body habitus 5/24/2021    Sleep apnea     CPAP    Stress 5/24/2021     Past Surgical History:   Procedure Laterality Date    HX BILATERAL MASTECTOMY Bilateral 03/31/2021    HX BREAST BIOPSY Right     9/2020    HX BREAST LUMPECTOMY Left 4/1/2021    EXPLORATION LEFT MASTECTOMY AND EVACUATION OF HEMATOMA performed by Mariam Dumont MD at [address] HX MASTECTOMY Bilateral 3/31/2021    BILATERAL MASTECTOMY; LEFT SENTINEL NODE BIOPSY; RIGHT AXILLARY DESSECTION performed by Mariam Dumont MD at [address] HX VASCULAR ACCESS      IR INSERT TUNL CVC W PORT OVER 5 YEARS  11/4/2020     Current Outpatient Medications   Medication Sig    chlorhexidine (PERIDEX) 0.12 % solution     letrozole (FEMARA) 2.5 mg tablet     spironolactone (ALDACTONE) 25 mg tablet Take 1 Tablet by mouth daily.  rosuvastatin (CRESTOR) 10 mg tablet Take 1 Tablet by mouth nightly.  amLODIPine (NORVASC) 5 mg tablet Take 1 Tablet by mouth daily. Indications: high blood pressure    OTHER Cancer medication daily    cholecalciferol, vitamin D3, (Vitamin D3) 50 mcg (2,000 unit) tab Take 1 Tab by mouth daily. No current facility-administered medications for this visit. Allergies and Intolerances:   No Known Allergies  Family History:   Family History   Problem Relation Age of Onset    Diabetes Mother     Hypertension Father      Social History:   She  reports that she quit smoking about 12 months ago. She has a 1.00 pack-year smoking history. She has never used smokeless tobacco.   Social History     Substance and Sexual Activity   Alcohol Use Not Currently     Immunization History:  Immunization History   Administered Date(s) Administered    COVID-19, Mitch Frederick, Primary or Immunocompromised Series, MRNA, PF, 100mcg/0.5mL 03/05/2021, 04/02/2021    Influenza Vaccine 11/22/2021       Review of Systems:   As above included in HPI. Otherwise 11 point review of systems negative including constitutional, skin, HENT, eyes, respiratory, cardiovascular, gastrointestinal, genitourinary, musculoskeletal, endocrine, hematologic, allergy, and neurologic.       Physical:   Visit Vitals  /66   Pulse (!) 104   Temp 96.8 °F (36 °C) (Temporal)   Resp 16   Ht 5' 6\" (1.676 m)   Wt 302 lb 3.2 oz (137.1 kg)   SpO2 98%   BMI 48.78 kg/m²      Wt Readings from Last 3 Encounters:   11/24/21 302 lb 3.2 oz (137.1 kg)   09/04/21 294 lb (133.4 kg)   05/24/21 295 lb 3.2 oz (133.9 kg)         Exam:   Physical Exam  Vitals and nursing note reviewed. Constitutional:       Appearance: Normal appearance. She is obese. HENT:      Head: Normocephalic and atraumatic. Right Ear: External ear normal.      Left Ear: External ear normal.      Mouth/Throat:      Mouth: Mucous membranes are moist.   Eyes:      Extraocular Movements: Extraocular movements intact. Conjunctiva/sclera: Conjunctivae normal.   Neck:      Vascular: No carotid bruit. Cardiovascular:      Rate and Rhythm: Normal rate and regular rhythm. Pulses: Normal pulses. Heart sounds: Normal heart sounds. Comments: No edema noted  Pulmonary:      Effort: Pulmonary effort is normal. No respiratory distress. Breath sounds: Normal breath sounds. No wheezing. Musculoskeletal:         General: Normal range of motion. Cervical back: Normal range of motion and neck supple. Comments: Gait stable   Skin:     General: Skin is warm and dry. Neurological:      General: No focal deficit present. Mental Status: She is alert and oriented to person, place, and time. Psychiatric:         Mood and Affect: Mood normal.         Behavior: Behavior normal.         Thought Content: Thought content normal.         Judgment: Judgment normal.         Review of Data:  Labs reviewed: Yes  A1c 5.8 to 6.4  ; .8  CMP good    Plan:    ICD-10-CM ICD-9-CM    1. Essential hypertension  I10 401.9 spironolactone (ALDACTONE) 25 mg tablet      amLODIPine (NORVASC) 5 mg tablet   2. Mixed hyperlipidemia  E78.2 272.2 rosuvastatin (CRESTOR) 10 mg tablet   3. Hypokalemia  E87.6 276.8 spironolactone (ALDACTONE) 25 mg tablet   4.  Prediabetes  R73.03 790.29    5. Stress  F43.9 V62.89    6. Malignant neoplasm of nipple of right breast in female, unspecified estrogen receptor status (HCC)  C50.011 174.0    7. Obese body habitus  E66.9 278.00    8. Cervical cancer screening  Z12.4 V76.2 REFERRAL TO GYNECOLOGY     -Hypertension  continue 1025 mg daily  Continue amlodipine 5 mg daily  Limit sodium in diet to 2g/d  Avoid pork  Initiate cardio exercise  Weight reduction  Increase water intake  Report BP readings greater than 139/89 to office    -Hyperlipidemia  .8;   continue lovastatin 10 mg daily  Reduce fried fatty or oily foods in diet  Limit red meats, processed foods, and alcohol. Limit fast food  Work on weight reduction  Increase water intake    -Hypokalemia (resolved)  Potassium level 4.0 after discontinuing chlorthalidone and initiating Aldactone    -Prediabetes  A1c 5.8-6.4  Discussed medication therapy in detail  Discussed proper diet and exercise  Will reevaluate 6 months  If A1c remains elevated will initiate Metformin    -Stress  List of local psychiatry offices given to patient    -Breast cancer  Specialist managed condition is being evaluated/managed by Dr. Malvina Olszewski and Dr Mello Blount. No acute findings today meriting change in management plan. -Obese body habitus  BMI is out of normal parameters and plan is as follows: Discussed in great detail on diet, portion control, exercise, avoiding foods high in sugar, carbs and starches, fatty/greasy foods and to eat until satisfied not til full. I have counseled this patient on diet and exercise regimens as well. Recommended weight watchers, Noom, and GOLO. -  Colonoscopy scheduled for 2/10/2022  Placed referral to GYN for Pap smear  Patient declines PNA 13 today        Follow up 6 months  Labs needed 1 week prior to appt: No  POC A1c    Dr. Marcelo Real, AGNP-C, DNP  [facility]       The total face time was 30 minutes.  Greater than 50% of that time was spent in counseling and/or coordination of care. My summary of patient counseling and coordination of care includes Reviewing medical record, assessing patient, placing orders, and discussing plan of care with patient.

## 2021-11-24 NOTE — PROGRESS NOTES
Chief Complaint   Patient presents with    Follow-up     3 months    Labs     11/16/2021         1. \"Have you been to the ER, urgent care clinic since your last visit? Hospitalized since your last visit? \" no    2. \"Have you seen or consulted any other health care providers outside of the [address] [place] [address] since your last visit? \" no    3. For patients aged 39-70: Has the patient had a colonoscopy? no    If the patient is female:    4. For patients aged 41-77: Has the patient had a mammogram within the past 2 years? yes    5. For patients aged 21-65: Has the patient had a pap smear?  no

## 2021-12-03 ENCOUNTER — TELEPHONE (OUTPATIENT)
Dept: INTERNAL MEDICINE CLINIC | Age: 56
End: 2021-12-03

## 2021-12-03 NOTE — TELEPHONE ENCOUNTER
----- Message from Michael Burnett sent at 12/2/2021  2:42 PM EST -----  Subject: Referral Request    QUESTIONS   Reason for referral request? Patient called in trying to see if she can   schedule a pap sear at this location or if she will need a referral to a   different location. Please follow up to clarify   Has the physician seen you for this condition before? No   Preferred Specialist (if applicable)? Do you already have an appointment scheduled? No  Additional Information for Provider?   ---------------------------------------------------------------------------  --------------  CALL BACK INFO  What is the best way for the office to contact you? OK to leave message on   voicemail  Preferred Call Back Phone Number?  [phone]

## 2021-12-06 NOTE — TELEPHONE ENCOUNTER
Patient reached and given contact info to [facility]. A referral was faxed over to practice on 11/24/2021.

## 2022-01-13 ENCOUNTER — TELEPHONE (OUTPATIENT)
Dept: INTERNAL MEDICINE CLINIC | Age: 57
End: 2022-01-13

## 2022-01-13 NOTE — TELEPHONE ENCOUNTER
----- Message from [facility] sent at 1/13/2022  8:29 AM EST -----  Subject: Message to Provider    QUESTIONS  Information for Provider? pt would like to be tested for covid. She have a   sore throat and headache. Also been exposed. ---------------------------------------------------------------------------  --------------  Nii MELO  What is the best way for the office to contact you? OK to leave message on   voicemail  Preferred Call Back Phone Number? [phone]  ---------------------------------------------------------------------------  --------------  SCRIPT ANSWERS  Relationship to Patient?  Self

## 2022-01-13 NOTE — TELEPHONE ENCOUNTER
Patient called back, said she was able to find somewhere to get tested for covid tomorrow. She wanted to ask Dr. Ruel Carrera about the [address] and if that would be a good diet system for her to try. She can be reached at [phone].   Please advise, thank you

## 2022-01-18 ENCOUNTER — TRANSCRIBE ORDER (OUTPATIENT)
Dept: SCHEDULING | Age: 57
End: 2022-01-18

## 2022-01-18 DIAGNOSIS — C50.811 MALIGNANT NEOPLASM OF OVERLAPPING SITES OF RIGHT FEMALE BREAST (HCC): Primary | ICD-10-CM

## 2022-02-02 ENCOUNTER — HOSPITAL ENCOUNTER (EMERGENCY)
Age: 57
Discharge: HOME | End: 2022-02-02
Attending: STUDENT IN AN ORGANIZED HEALTH CARE EDUCATION/TRAINING PROGRAM
Payer: COMMERCIAL

## 2022-02-02 ENCOUNTER — APPOINTMENT (OUTPATIENT)
Dept: GENERAL RADIOLOGY | Age: 57
End: 2022-02-02
Attending: PHYSICIAN ASSISTANT
Payer: COMMERCIAL

## 2022-02-02 VITALS
DIASTOLIC BLOOD PRESSURE: 82 MMHG | TEMPERATURE: 98.2 F | RESPIRATION RATE: 21 BRPM | SYSTOLIC BLOOD PRESSURE: 155 MMHG | OXYGEN SATURATION: 97 % | HEART RATE: 105 BPM

## 2022-02-02 DIAGNOSIS — S60.212A CONTUSION OF LEFT WRIST, INITIAL ENCOUNTER: ICD-10-CM

## 2022-02-02 DIAGNOSIS — M25.532 LEFT WRIST PAIN: Primary | ICD-10-CM

## 2022-02-02 PROCEDURE — 99281 EMR DPT VST MAYX REQ PHY/QHP: CPT

## 2022-02-02 PROCEDURE — 73110 X-RAY EXAM OF WRIST: CPT

## 2022-02-02 RX ORDER — NAPROXEN 500 MG/1
500 TABLET ORAL 2 TIMES DAILY WITH MEALS
Qty: 20 TABLET | Refills: 0 | Status: SHIPPED | OUTPATIENT
Start: 2022-02-02 | End: 2022-05-20

## 2022-02-03 NOTE — DISCHARGE INSTRUCTIONS
[facility] Activation    Thank you for requesting access to [facility]. Please follow the instructions below to securely access and download your online medical record. [facility] allows you to send messages to your doctor, view your test results, renew your prescriptions, schedule appointments, and more. How Do I Sign Up? In your internet browser, go to [email or web address]  Click on the First Time User? Click Here link in the Sign In box. You will be redirect to the New Member Sign Up page. Enter your [facility] Access Code exactly as it appears below. You will not need to use this code after youve completed the sign-up process. If you do not sign up before the expiration date, you must request a new code. [facility] Access Code: [email or web address]  Expires: 2022 10:34 AM (This is the date your [facility] access code will )    Enter the last four digits of your Social Security Number (xxxx) and Date of Birth (mm/dd/yyyy) as indicated and click Submit. You will be taken to the next sign-up page. Create a [facility] ID. This will be your [facility] login ID and cannot be changed, so think of one that is secure and easy to remember. Create a [facility] password. You can change your password at any time. Enter your Password Reset Question and Answer. This can be used at a later time if you forget your password. Enter your e-mail address. You will receive e-mail notification when new information is available in [address] Click Sign Up. You can now view and download portions of your medical record. Click the [place] link to download a portable copy of your medical information. Additional Information    If you have questions, please visit the Frequently Asked Questions section of the [facility] website at [email or web address] Remember, [facility] is NOT to be used for urgent needs. For medical emergencies, dial 911.

## 2022-02-03 NOTE — ED TRIAGE NOTES
Pt presents with L wrist pain x 3-4weeks. Pt states she hit her wrist against the bedside table a couple of weeks ago and then tonight put weight on wrist while on bed. Pain has only gotten worse. Has not tried any pain relief measures. No deformity noted at time of triage.

## 2022-02-04 ENCOUNTER — HOSPITAL ENCOUNTER (OUTPATIENT)
Dept: RADIATION THERAPY | Age: 57
Discharge: HOME | End: 2022-02-04
Payer: COMMERCIAL

## 2022-02-04 PROCEDURE — 99213 OFFICE O/P EST LOW 20 MIN: CPT | Performed by: RADIOLOGY

## 2022-02-04 PROCEDURE — 99211 OFF/OP EST MAY X REQ PHY/QHP: CPT

## 2022-02-09 ENCOUNTER — HOSPITAL ENCOUNTER (OUTPATIENT)
Dept: CT IMAGING | Age: 57
Discharge: HOME | End: 2022-02-09
Attending: INTERNAL MEDICINE
Payer: COMMERCIAL

## 2022-02-09 DIAGNOSIS — C50.811 MALIGNANT NEOPLASM OF OVERLAPPING SITES OF RIGHT FEMALE BREAST (HCC): ICD-10-CM

## 2022-02-09 LAB — CREAT UR-MCNC: 0.8 MG/DL (ref 0.6–1.3)

## 2022-02-09 PROCEDURE — 74011000636 HC RX REV CODE- 636: Performed by: INTERNAL MEDICINE

## 2022-02-09 PROCEDURE — 82565 ASSAY OF CREATININE: CPT

## 2022-02-09 PROCEDURE — 74177 CT ABD & PELVIS W/CONTRAST: CPT

## 2022-02-09 RX ADMIN — IOPAMIDOL 100 ML: 612 INJECTION, SOLUTION INTRAVENOUS at 13:29

## 2022-02-10 ENCOUNTER — OFFICE VISIT (OUTPATIENT)
Dept: ORTHOPEDIC SURGERY | Age: 57
End: 2022-02-10
Payer: COMMERCIAL

## 2022-02-10 VITALS — WEIGHT: 293 LBS | OXYGEN SATURATION: 98 % | HEIGHT: 66 IN | BODY MASS INDEX: 47.09 KG/M2 | HEART RATE: 94 BPM

## 2022-02-10 DIAGNOSIS — M18.12 PRIMARY OSTEOARTHRITIS OF FIRST CARPOMETACARPAL JOINT OF LEFT HAND: ICD-10-CM

## 2022-02-10 DIAGNOSIS — M65.4 DE QUERVAIN'S TENOSYNOVITIS, LEFT: Primary | ICD-10-CM

## 2022-02-10 PROCEDURE — 99203 OFFICE O/P NEW LOW 30 MIN: CPT | Performed by: ORTHOPAEDIC SURGERY

## 2022-02-10 PROCEDURE — 20600 DRAIN/INJ JOINT/BURSA W/O US: CPT | Performed by: ORTHOPAEDIC SURGERY

## 2022-02-10 PROCEDURE — 20605 DRAIN/INJ JOINT/BURSA W/O US: CPT | Performed by: ORTHOPAEDIC SURGERY

## 2022-02-10 NOTE — PROGRESS NOTES
Aime Greenwood is a 49 y.o. female right handed unknown employment. Worker's Compensation and legal considerations: none filed. Vitals:    02/10/22 1441   Pulse: 94   SpO2: 98%   Weight: 309 lb (140.2 kg)   Height: 5' 6\" (1.676 m)   PainSc:  10 - Worst pain ever   PainLoc: Wrist           Chief Complaint   Patient presents with    Wrist Pain     left wrist         HPI: Patient presents with complaints of left wrist pain that radiates into the thumb.     Date of onset: Late 2021    Injury: No    Prior Treatment:  No    Numbness/ Tingling: No      ROS: Review of Systems - General ROS: negative  Psychological ROS: negative  ENT ROS: negative  Allergy and Immunology ROS: negative  Hematological and Lymphatic ROS: negative  Respiratory ROS: no cough, shortness of breath, or wheezing  Cardiovascular ROS: no chest pain or dyspnea on exertion  Gastrointestinal ROS: no abdominal pain, change in bowel habits, or black or bloody stools  Musculoskeletal ROS: negative  Neurological ROS: negative  Dermatological ROS: negative    Past Medical History:   Diagnosis Date    Ankle swelling 5/24/2021    Arthritis     Cancer ([address]     Essential hypertension 5/24/2021    High cholesterol     Hypertension     Malignant neoplasm of right female breast ([address] 3/31/2021    Mixed hyperlipidemia 5/24/2021    Obese body habitus 5/24/2021    Sleep apnea     CPAP    Stress 5/24/2021       Past Surgical History:   Procedure Laterality Date    HX BILATERAL MASTECTOMY Bilateral 03/31/2021    HX BREAST BIOPSY Right     9/2020    HX BREAST LUMPECTOMY Left 4/1/2021    EXPLORATION LEFT MASTECTOMY AND EVACUATION OF HEMATOMA performed by Meli Gooden MD at [facility] Bilateral 3/31/2021    BILATERAL MASTECTOMY; LEFT SENTINEL NODE BIOPSY; RIGHT AXILLARY DESSECTION performed by Meli Gooden MD at [address] HX VASCULAR ACCESS      IR INSERT TUNL CVC W PORT OVER 5 YEARS  11/4/2020    IR REMOVE TUNL CVAD W PORT/PUMP  1/25/2022       Current Outpatient Medications   Medication Sig Dispense Refill    naproxen (NAPROSYN) 500 mg tablet Take 1 Tablet by mouth two (2) times daily (with meals). 20 Tablet 0    chlorhexidine (PERIDEX) 0.12 % solution       letrozole (FEMARA) 2.5 mg tablet       spironolactone (ALDACTONE) 25 mg tablet Take 1 Tablet by mouth daily. 90 Tablet 1    rosuvastatin (CRESTOR) 10 mg tablet Take 1 Tablet by mouth nightly. 90 Tablet 1    amLODIPine (NORVASC) 5 mg tablet Take 1 Tablet by mouth daily. Indications: high blood pressure 90 Tablet 1    OTHER Cancer medication daily      cholecalciferol, vitamin D3, (Vitamin D3) 50 mcg (2,000 unit) tab Take 1 Tab by mouth daily. No Known Allergies        PE:     Physical Exam  Vitals and nursing note reviewed. Constitutional:       General: She is not in acute distress. Appearance: Normal appearance. She is not ill-appearing. Cardiovascular:      Pulses: Normal pulses. Pulmonary:      Effort: Pulmonary effort is normal. No respiratory distress. Musculoskeletal:         General: Swelling and tenderness present. No deformity or signs of injury. Cervical back: Normal range of motion and neck supple. Right lower leg: No edema. Left lower leg: No edema. Skin:     General: Skin is warm and dry. Capillary Refill: Capillary refill takes less than 2 seconds. Findings: No bruising or erythema. Neurological:      General: No focal deficit present. Mental Status: She is alert and oriented to person, place, and time.    Psychiatric:         Mood and Affect: Mood normal.         Behavior: Behavior normal.            Wrist:    Tenderness L R Test L R   1st Ext Comp + - Finkelstein's + -   Snuff Box - - Chavez - -   2nd Ext Comp - - S-L Shear - -   S-L Joint - - L-T Shear - -   L-T Joint - - DRUJ Sup - -   6th Ext Comp - - DRUJ Pro - -   Ulnar Snuff - - DRUJ Grind - -   Fovea - - TFCC - -   STT Joint - - Mid-Carp Inst - -   FCR - - P-T Grind - -   Intersection - - ECU Sublux. - -      Dorsal Ganglion: -   Volar Ganglion: -      ROM: Full      Hand:    Examination L Digit(s) R Digit(s)   1st CMC Tenderness +  -    1st CMC Grind +  -    Josephine Nodes -  -    Heberden Nodes -  -    A1 Pulley Tenderness -  -    Triggering -  -    UCL Instability -  -    RCL Instability -  -    Lateral Stress Pain -  -    Palmar Cords -  -    Tabletop test -  -    Garrod's Pads -  -     Strength       Pinch Strength         ROM: Full        Imagin/2/2022 Left Wrist External Films  FINDINGS:  Three views have been obtained. There is degenerative changes at the first  carpometacarpal joint space with hypertrophic bone formation. There is  bone-on-bone contact. Carpal alignment is normal. Normal bone density.  MPRESSION   Severe first carpometacarpal osteoarthritis. ICD-10-CM ICD-9-CM    1. De Quervain's tenosynovitis, left  M65.4 727.04 INJECT TENDON SHEATH/LIGAMENT      triamcinolone acetonide (KENALOG) 10 mg/mL injection 10 mg      AMB SUPPLY ORDER   2. Primary osteoarthritis of first carpometacarpal joint of left hand  M18.12 715.14 DRAIN/INJECT SMALL JOINT/BURSA      triamcinolone acetonide (KENALOG) 10 mg/mL injection 10 mg         Plan:     Left first dorsal compartment injection left thumb CMC joint injection. Left DQ brace to be worn at all times for 6 weeks. Follow-up and Dispositions    · Return in about 6 weeks (around 3/24/2022) for reevaluation, xrays, and CMC brace.           Plan was reviewed with patient, who verbalized agreement and understanding of the plan     [facility] NOTE        Chart reviewed for the following:   IKev DO, have reviewed the History, Physical and updated the Allergic reactions for Herminio German performed immediately prior to start of procedure:   IBilly DO, have performed the following reviews on [address] [place] [address] prior to the start of the procedure:            * Patient was identified by name and date of birth   * Agreement on procedure being performed was verified  * Risks and Benefits explained to the patient  * Procedure site verified and marked as necessary  * Patient was positioned for comfort  * Consent was signed and verified     Time: 15:40      Date of procedure: 2/14/2022    Procedure performed by: Trini Amaral DO    Provider assisted by: Maricarmen Caballero MA    Patient assisted by: self    How tolerated by patient: tolerated the procedure well with no complications    Post Procedural Pain Scale: 0 - No Hurt    Comments: none    Procedure:  After consent was obtained, using sterile technique the left wrist and thumb base was prepped. Local anesthetic used: 1% lidocaine. Kenalog 5 mg X2 and was then injected and the needle withdrawn. The procedure was well tolerated. The patient is asked to continue to rest the area for a few more days before resuming regular activities. It may be more painful for the first 1-2 days. Watch for fever, or increased swelling or persistent pain in the joint. Call or return to clinic prn if such symptoms occur or there is failure to improve as anticipated.

## 2022-02-22 ENCOUNTER — CLINICAL SUPPORT (OUTPATIENT)
Dept: SURGERY | Age: 57
End: 2022-02-22

## 2022-02-22 VITALS
HEIGHT: 66 IN | BODY MASS INDEX: 47.09 KG/M2 | HEART RATE: 81 BPM | WEIGHT: 293 LBS | OXYGEN SATURATION: 96 % | TEMPERATURE: 97.2 F | RESPIRATION RATE: 16 BRPM

## 2022-02-22 DIAGNOSIS — Z12.11 COLON CANCER SCREENING: ICD-10-CM

## 2022-02-22 DIAGNOSIS — Z01.818 PRE-OP TESTING: Primary | ICD-10-CM

## 2022-02-22 RX ORDER — POLYETHYLENE GLYCOL 3350 17 G/17G
POWDER, FOR SOLUTION ORAL
Qty: 238 G | Refills: 0 | Status: SHIPPED | OUTPATIENT
Start: 2022-02-22 | End: 2022-05-20

## 2022-02-22 RX ORDER — BISACODYL 5 MG
TABLET, DELAYED RELEASE (ENTERIC COATED) ORAL
Qty: 4 TABLET | Refills: 0 | Status: SHIPPED | OUTPATIENT
Start: 2022-02-22

## 2022-02-22 NOTE — PROGRESS NOTES
Colon Screen    Patient: Yehuda Jones MRN: [id number]  SSN: [id number]    YOB: 1965  Age: 49 y.o. Sex: female        Subjective:   Yehuda Jones was referred by her PCP, Liz Strong DNP. Patient referred for colonoscopy for   Screening colonoscopy. Patient denies rectal pain or bleeding. Abdominal surgeries as described below, specifically none. Family history as described below, specifically mother with colon polyps. Patient has never had a colonoscopy. No Known Allergies    Past Medical History:   Diagnosis Date    Ankle swelling 2021    Arthritis     [facility] ([facility]     Essential hypertension 2021    High cholesterol     Hypertension     Malignant neoplasm of right female breast ([facility] 3/31/2021    Mixed hyperlipidemia 2021    Obese body habitus 2021    Sleep apnea     CPAP    Stress 2021     Past Surgical History:   Procedure Laterality Date    HX BILATERAL MASTECTOMY Bilateral 2021    HX BREAST BIOPSY Right     2020    HX BREAST LUMPECTOMY Left 2021    EXPLORATION LEFT MASTECTOMY AND EVACUATION OF HEMATOMA performed by Edwin Calvo MD at [address] HX MASTECTOMY Bilateral 3/31/2021    BILATERAL MASTECTOMY; LEFT SENTINEL NODE BIOPSY; RIGHT AXILLARY DESSECTION performed by Edwin Calvo MD at [facility] [place]    HX VASCULAR ACCESS      IR INSERT TUNL CVC W PORT OVER 5 YEARS  2020    IR REMOVE TUNL CVAD W PORT/PUMP  2022      Family History   Problem Relation Age of Onset    Diabetes Mother     Colon Polyps Mother     Hypertension Father      Social History     Tobacco Use    Smoking status: Former Smoker     Packs/day: 0.50     Years: 2.00     Pack years: 1.00     Quit date: 10/30/2020     Years since quittin.3    Smokeless tobacco: Never Used   Substance Use Topics    Alcohol use: Not Currently      Prior to Admission medications    Medication Sig Start Date End Date Taking?  Authorizing Provider   naproxen (NAPROSYN) 500 mg tablet Take 1 Tablet by mouth two (2) times daily (with meals). 2/2/22  Yes Marlen Sandoval PA-C   chlorhexidine (PERIDEX) 0.12 % solution  11/5/21  Yes Provider, Historical   letrozole [facility]) 2.5 mg tablet  11/1/21  Yes Provider, Historical   spironolactone (ALDACTONE) 25 mg tablet Take 1 Tablet by mouth daily. 11/24/21  Yes Debbie Del Rosario DNP   rosuvastatin (CRESTOR) 10 mg tablet Take 1 Tablet by mouth nightly. 11/24/21  Yes Altagracia Del Rosario DNP   amLODIPine (NORVASC) 5 mg tablet Take 1 Tablet by mouth daily. Indications: high blood pressure 11/24/21  Yes Altagracia Del Rosario [place]   OTHER Cancer medication daily   Yes Provider, Historical   cholecalciferol, vitamin D3, (Vitamin D3) 50 mcg (2,000 unit) tab Take 1 Tab by mouth daily. Yes Provider, Historical          Review of Systems:  Review of Systems   Constitutional: Positive for diaphoresis. Negative for chills, fever, malaise/fatigue and weight loss. Hot flashes   HENT: Negative. Eyes: Negative. Respiratory: Negative. Cardiovascular: Negative. Gastrointestinal: Negative. Genitourinary: Negative. Musculoskeletal: Positive for joint pain. Negative for back pain, falls, myalgias and neck pain. Left wrist   Skin: Negative. Neurological: Negative. Endo/Heme/Allergies: Negative. Psychiatric/Behavioral: Negative. Risks colonoscopy described- colon injury, missed lesion, anesthesia problems, bleeding       Mary Apple, MARIA LUISA  February 22, 3898  10:50 AM

## 2022-03-18 PROBLEM — E66.9 OBESE BODY HABITUS: Status: ACTIVE | Noted: 2021-05-24

## 2022-03-18 PROBLEM — E78.2 MIXED HYPERLIPIDEMIA: Status: ACTIVE | Noted: 2021-05-24

## 2022-03-19 PROBLEM — F43.9 STRESS: Status: ACTIVE | Noted: 2021-05-24

## 2022-03-19 PROBLEM — R73.03 PREDIABETES: Status: ACTIVE | Noted: 2021-05-25

## 2022-03-19 PROBLEM — I10 ESSENTIAL HYPERTENSION: Status: ACTIVE | Noted: 2021-05-24

## 2022-03-19 PROBLEM — M25.473 ANKLE SWELLING: Status: ACTIVE | Noted: 2021-05-24

## 2022-03-19 PROBLEM — C50.911 MALIGNANT NEOPLASM OF RIGHT FEMALE BREAST (HCC): Status: ACTIVE | Noted: 2021-03-31

## 2022-03-19 PROBLEM — E87.6 HYPOKALEMIA: Status: ACTIVE | Noted: 2021-05-25

## 2022-03-28 ENCOUNTER — OFFICE VISIT (OUTPATIENT)
Dept: ORTHOPEDIC SURGERY | Age: 57
End: 2022-03-28
Payer: COMMERCIAL

## 2022-03-28 VITALS
OXYGEN SATURATION: 97 % | WEIGHT: 293 LBS | HEIGHT: 66 IN | TEMPERATURE: 97.6 F | HEART RATE: 102 BPM | BODY MASS INDEX: 47.09 KG/M2

## 2022-03-28 DIAGNOSIS — M65.4 DE QUERVAIN'S TENOSYNOVITIS, LEFT: ICD-10-CM

## 2022-03-28 DIAGNOSIS — M18.12 ARTHRITIS OF CARPOMETACARPAL (CMC) JOINT OF LEFT THUMB: Primary | ICD-10-CM

## 2022-03-28 PROCEDURE — 99213 OFFICE O/P EST LOW 20 MIN: CPT | Performed by: ORTHOPAEDIC SURGERY

## 2022-03-28 PROCEDURE — 73110 X-RAY EXAM OF WRIST: CPT | Performed by: ORTHOPAEDIC SURGERY

## 2022-03-28 NOTE — PROGRESS NOTES
Regulo Priest is a 49 y.o. female right handed unknown employment. Worker's Compensation and legal considerations: none filed. Vitals:    03/28/22 1055   Pulse: (!) 102   Temp: 97.6 °F (36.4 °C)   SpO2: 97%   Weight: 299 lb (135.6 kg)   Height: 5' 6\" (1.676 m)   PainSc:   0 - No pain   PainLoc: Finger           Chief Complaint   Patient presents with    Thumb Pain     left       HPI: Patient returns today for follow-up after left wrist DQ injection and left thumb CMC joint injection and DQ brace. She reports to be doing much better. Initial HPI: Patient presents with complaints of left wrist pain that radiates into the thumb.     Date of onset: Late 2021    Injury: No    Prior Treatment:  Yes: Comment: left wrist DQ injection and left thumb CMC joint injection and DQ brace    Numbness/ Tingling: No      ROS: Review of Systems - General ROS: negative  Psychological ROS: negative  ENT ROS: negative  Allergy and Immunology ROS: negative  Hematological and Lymphatic ROS: negative  Respiratory ROS: no cough, shortness of breath, or wheezing  Cardiovascular ROS: no chest pain or dyspnea on exertion  Gastrointestinal ROS: no abdominal pain, change in bowel habits, or black or bloody stools  Musculoskeletal ROS: negative  Neurological ROS: negative  Dermatological ROS: negative    Past Medical History:   Diagnosis Date    Ankle swelling 5/24/2021    Arthritis     [facility] ([facility]     Essential hypertension 5/24/2021    High cholesterol     Hypertension     Malignant neoplasm of right female breast ([address] 3/31/2021    Mixed hyperlipidemia 5/24/2021    Obese body habitus 5/24/2021    Sleep apnea     CPAP    Stress 5/24/2021       Past Surgical History:   Procedure Laterality Date    HX BILATERAL MASTECTOMY Bilateral 03/31/2021    HX BREAST BIOPSY Right     9/2020    HX BREAST LUMPECTOMY Left 4/1/2021    EXPLORATION LEFT MASTECTOMY AND EVACUATION OF HEMATOMA performed by Aide Pearson MD at [address] [place] [address]  HX MASTECTOMY Bilateral 3/31/2021    BILATERAL MASTECTOMY; LEFT SENTINEL NODE BIOPSY; RIGHT AXILLARY DESSECTION performed by Amarjit Gallagher MD at [address] HX VASCULAR ACCESS      IR INSERT TUNL CVC W PORT OVER 5 YEARS  11/4/2020    IR REMOVE TUNL CVAD W PORT/PUMP  1/25/2022       Current Outpatient Medications   Medication Sig Dispense Refill    bisacodyL (Dulcolax, bisacodyl,) 5 mg EC tablet Take tablets as directed for bowel prep. 4 Tablet 0    polyethylene glycol (Miralax) 17 gram/dose powder Take as directed for bowel preparation 238 g 0    naproxen (NAPROSYN) 500 mg tablet Take 1 Tablet by mouth two (2) times daily (with meals). 20 Tablet 0    chlorhexidine (PERIDEX) 0.12 % solution       letrozole (FEMARA) 2.5 mg tablet       spironolactone (ALDACTONE) 25 mg tablet Take 1 Tablet by mouth daily. 90 Tablet 1    rosuvastatin (CRESTOR) 10 mg tablet Take 1 Tablet by mouth nightly. 90 Tablet 1    amLODIPine (NORVASC) 5 mg tablet Take 1 Tablet by mouth daily. Indications: high blood pressure 90 Tablet 1    OTHER Cancer medication daily      cholecalciferol, vitamin D3, (Vitamin D3) 50 mcg (2,000 unit) tab Take 1 Tab by mouth daily. No Known Allergies        PE:     Physical Exam  Vitals and nursing note reviewed. Constitutional:       General: She is not in acute distress. Appearance: Normal appearance. She is not ill-appearing. Cardiovascular:      Pulses: Normal pulses. Pulmonary:      Effort: Pulmonary effort is normal. No respiratory distress. Musculoskeletal:         General: Swelling and tenderness present. No deformity or signs of injury. Cervical back: Normal range of motion and neck supple. Right lower leg: No edema. Left lower leg: No edema. Skin:     General: Skin is warm and dry. Capillary Refill: Capillary refill takes less than 2 seconds. Findings: No bruising or erythema. Neurological:      General: No focal deficit present. Mental Status: She is alert and oriented to person, place, and time. Psychiatric:         Mood and Affect: Mood normal.         Behavior: Behavior normal.            Wrist:    Tenderness L R Test L R   1st Ext Comp - - Finkelstein's - -   Snuff Box - - Chavez - -   2nd Ext Comp - - S-L Shear - -   S-L Joint - - L-T Shear - -   L-T Joint - - DRUJ Sup - -   6th Ext Comp - - DRUJ Pro - -   Ulnar Snuff - - DRUJ Grind - -   Fovea - - TFCC - -   STT Joint - - Mid-Carp Inst - -   FCR - - P-T Grind - -   Intersection - - ECU Sublux. - -      Dorsal Ganglion: -   Volar Ganglion: -      ROM: Full      Hand:    Examination L Digit(s) R Digit(s)   1st CMC Tenderness +-  -    1st CMC Grind +-  -    Josephine Nodes -  -    Heberden Nodes -  -    A1 Pulley Tenderness -  -    Triggering -  -    UCL Instability -  -    RCL Instability -  -    Lateral Stress Pain -  -    Palmar Cords -  -    Tabletop test -  -    Garrod's Pads -  -     Strength       Pinch Strength         ROM: Full        Imaging:     3/28/2022 3 views of left wrist is significant for severe degenerative changes at the [facility] joints. There are no other osseous abnormalities noted. 2/2/2022 Left Wrist External Films  FINDINGS:  Three views have been obtained. There is degenerative changes at the first  carpometacarpal joint space with hypertrophic bone formation. There is  bone-on-bone contact. Carpal alignment is normal. Normal bone density.  MPRESSION   Severe first carpometacarpal osteoarthritis. ICD-10-CM ICD-9-CM    1. Arthritis of carpometacarpal (CMC) joint of left thumb  M18.12 716.94 AMB POC XRAY, WRIST; COMPLETE, 3+ VIE      AMB SUPPLY ORDER   2. De Quervain's tenosynovitis, left  M65.4 727.04          Plan:     Left thumb CMC brace to be worn as much as possible when active. Follow-up and Dispositions    · Return if symptoms worsen or fail to improve.           Plan was reviewed with patient, who verbalized agreement and understanding of the plan

## 2022-05-20 ENCOUNTER — OFFICE VISIT (OUTPATIENT)
Dept: INTERNAL MEDICINE CLINIC | Age: 57
End: 2022-05-20
Payer: COMMERCIAL

## 2022-05-20 VITALS
OXYGEN SATURATION: 96 % | WEIGHT: 293 LBS | RESPIRATION RATE: 16 BRPM | TEMPERATURE: 96.1 F | HEART RATE: 101 BPM | HEIGHT: 66 IN | DIASTOLIC BLOOD PRESSURE: 89 MMHG | BODY MASS INDEX: 47.09 KG/M2 | SYSTOLIC BLOOD PRESSURE: 117 MMHG

## 2022-05-20 DIAGNOSIS — R73.03 PREDIABETES: ICD-10-CM

## 2022-05-20 DIAGNOSIS — E66.9 OBESE BODY HABITUS: ICD-10-CM

## 2022-05-20 DIAGNOSIS — E78.2 MIXED HYPERLIPIDEMIA: ICD-10-CM

## 2022-05-20 DIAGNOSIS — M25.472 ANKLE EDEMA, BILATERAL: ICD-10-CM

## 2022-05-20 DIAGNOSIS — I10 ESSENTIAL HYPERTENSION: Primary | ICD-10-CM

## 2022-05-20 DIAGNOSIS — Z12.4 CERVICAL CANCER SCREENING: ICD-10-CM

## 2022-05-20 DIAGNOSIS — M25.471 ANKLE EDEMA, BILATERAL: ICD-10-CM

## 2022-05-20 LAB — HBA1C MFR BLD HPLC: 6.2 %

## 2022-05-20 PROCEDURE — 99214 OFFICE O/P EST MOD 30 MIN: CPT | Performed by: NURSE PRACTITIONER

## 2022-05-20 PROCEDURE — 83036 HEMOGLOBIN GLYCOSYLATED A1C: CPT | Performed by: NURSE PRACTITIONER

## 2022-05-20 RX ORDER — SPIRONOLACTONE 25 MG/1
25 TABLET ORAL DAILY
Qty: 90 TABLET | Refills: 1 | Status: SHIPPED | OUTPATIENT
Start: 2022-05-20

## 2022-05-20 RX ORDER — ROSUVASTATIN CALCIUM 10 MG/1
10 TABLET, COATED ORAL
Qty: 90 TABLET | Refills: 1 | Status: SHIPPED | OUTPATIENT
Start: 2022-05-20

## 2022-05-20 RX ORDER — AMLODIPINE BESYLATE 5 MG/1
5 TABLET ORAL DAILY
Qty: 90 TABLET | Refills: 1 | Status: SHIPPED | OUTPATIENT
Start: 2022-05-20

## 2022-05-20 NOTE — PROGRESS NOTES
[facility] [address]  [phone] ALXCIZ/[phone] fax    5/20/2022    HPI:   Christoph Frederick 1965 is a pleasant BLACK/ female. She is  and is caring for her aunt. Past Medical History:   Diagnosis Date    Ankle swelling 5/24/2021    Arthritis     [facility]     Essential hypertension 5/24/2021    High cholesterol     Hypertension     Malignant neoplasm of right female breast ([address] 3/31/2021    Mixed hyperlipidemia 5/24/2021    Obese body habitus 5/24/2021    Sleep apnea     CPAP    Stress 5/24/2021     Past Surgical History:   Procedure Laterality Date    HX BILATERAL MASTECTOMY Bilateral 03/31/2021    HX BREAST BIOPSY Right     9/2020    HX BREAST LUMPECTOMY Left 4/1/2021    EXPLORATION LEFT MASTECTOMY AND EVACUATION OF HEMATOMA performed by Nelly Astudillo MD at [address] HX MASTECTOMY Bilateral 3/31/2021    BILATERAL MASTECTOMY; LEFT SENTINEL NODE BIOPSY; RIGHT AXILLARY DESSECTION performed by Nelly Astudillo MD at [address] HX VASCULAR ACCESS      IR INSERT TUNL CVC W PORT OVER 5 YEARS  11/4/2020    IR REMOVE TUNL CVAD W PORT/PUMP  1/25/2022     Current Outpatient Medications   Medication Sig    amLODIPine (NORVASC) 5 mg tablet Take 1 Tablet by mouth daily. Indications: high blood pressure    rosuvastatin (CRESTOR) 10 mg tablet Take 1 Tablet by mouth nightly.  spironolactone (ALDACTONE) 25 mg tablet Take 1 Tablet by mouth daily.  bisacodyL (Dulcolax, bisacodyl,) 5 mg EC tablet Take tablets as directed for bowel prep.  chlorhexidine (PERIDEX) 0.12 % solution     letrozole (FEMARA) 2.5 mg tablet     OTHER Cancer medication daily    cholecalciferol, vitamin D3, (Vitamin D3) 50 mcg (2,000 unit) tab Take 1 Tab by mouth daily. No current facility-administered medications for this visit.      Allergies and Intolerances:   No Known Allergies  Family History: Family History   Problem Relation Age of Onset    Diabetes Mother     Colon Polyps Mother     Hypertension Father      Social History:   She  reports that she quit smoking about 18 months ago. She has a 1.00 pack-year smoking history. She has never used smokeless tobacco.   Social History     Substance and Sexual Activity   Alcohol Use Not Currently     Immunization History:  Immunization History   Administered Date(s) Administered    COVID-19, Elio Machuca, Primary or Immunocompromised Series, MRNA, PF, 100mcg/0.5mL 03/05/2021, 04/02/2021    Influenza Vaccine 11/22/2021     Todays concern/HPI:  1. Weight gain. Used [employer] in the past and lost weight. The last 2 months she used [employer] and has gained 17lbs in 2 months. She is unsure as to why [employer] didn't work except she is not   exercising. She suspects Femara may be causing her weight gain  3. Cholesterol. Misunderstood instructions 6 months ago and stopped taking statin therapy. Hypertension: taking Aldactone and amlodipine. Tolerating well     Right breast cancer: DX Sept 2020. Underwent bilateral breast surgery (mastectomy) with Dr Roberto Ugalde. She was noted to have + lymph nodes to both breasts. She completed chemo back in June 2020. She continues Femara treatment under Dr. Michael Soni. Review of Systems:   As above included in HPI. Otherwise 11 point review of systems negative including constitutional, skin, HENT, eyes, respiratory, cardiovascular, gastrointestinal, genitourinary, musculoskeletal, endocrine, hematologic, allergy, and neurologic. Physical:   Visit Vitals  /89   Pulse (!) 101   Temp (!) 96.1 °F (35.6 °C) (Temporal)   Resp 16   Ht 5' 6\" (1.676 m)   Wt 316 lb 9.6 oz (143.6 kg)   SpO2 96%   BMI 51.10 kg/m²      Wt Readings from Last 3 Encounters:   05/20/22 316 lb 9.6 oz (143.6 kg)   03/28/22 299 lb (135.6 kg)   02/22/22 299 lb (135.6 kg)         Exam:   Physical Exam  Vitals and nursing note reviewed. Constitutional:       Appearance: Normal appearance. She is obese. HENT:      Head: Normocephalic and atraumatic. Right Ear: External ear normal.      Left Ear: External ear normal.      Mouth/Throat:      Mouth: Mucous membranes are moist.   Eyes:      Extraocular Movements: Extraocular movements intact. Conjunctiva/sclera: Conjunctivae normal.   Neck:      Vascular: No carotid bruit. Cardiovascular:      Rate and Rhythm: Normal rate and regular rhythm. Pulses: Normal pulses. Heart sounds: Normal heart sounds. Comments: 1+ nonpitting edema raz ankles  Pulmonary:      Effort: Pulmonary effort is normal. No respiratory distress. Breath sounds: Normal breath sounds. No wheezing. Musculoskeletal:         General: Normal range of motion. Cervical back: Normal range of motion and neck supple. Comments: Gait stable   Skin:     General: Skin is warm and dry. Neurological:      General: No focal deficit present. Mental Status: She is alert and oriented to person, place, and time. Psychiatric:         Mood and Affect: Mood normal.         Behavior: Behavior normal.         Review of Data:  POC A1c 6.2      Plan:    ICD-10-CM ICD-9-CM    1. Essential hypertension  I10 401.9 amLODIPine (NORVASC) 5 mg tablet      spironolactone (ALDACTONE) 25 mg tablet      METABOLIC PANEL, COMPREHENSIVE   2. Ankle edema, bilateral  M25.471 719.07     M25.472     3.  Mixed hyperlipidemia  E78.2 272.2 rosuvastatin (CRESTOR) 10 mg tablet      LIPID PANEL   4. Obese body habitus  E66.9 278.00 TSH 3RD GENERATION   5. Cervical cancer screening  Z12.4 V76.2 REFERRAL TO GYNECOLOGY   6. Prediabetes  R73.03 790.29 AMB POC HEMOGLOBIN A1C      HEMOGLOBIN A1C WITH EAG      MICROALBUMIN, UR, RAND W/ MICROALB/CREAT RATIO     -Hypertension  BP well controlled  continue amlodipine 5mg and aldactone 25mg every day     -Raz ankle edema  Most likely related to amlodipine  Elevate at night    -Hyperlipidemia  11/2021 .8;   Restart lovastatin 10 mg daily    -Prediabetes  POC A1c 6.2  No tx needed at this time.    Will reassess in 6m, if remains above 6, initiate metformin    -Obese body habitus  17lb weight gain in 2 months  Encouraged exercise regimen    -  Colonoscopy was scheduled for 2/10/2022; this is now scheduled for 7/6/2022  Placed referral to GYN for Pap smear back in Nov. Placed another referral to GYN today        Follow up 6 months with labs (A1c, microalbumin, lipid, CMP, TSH)      Dr. Garth Russo, AGNP-C, DNP  [facility]

## 2022-05-20 NOTE — PROGRESS NOTES
Chief Complaint   Patient presents with    Hypertension     6 month f/u     1. \"Have you been to the ER, urgent care clinic since your last visit? Hospitalized since your last visit? \" No    2. \"Have you seen or consulted any other health care providers outside of the [address] [place] [address] since your last visit? \" No     3. For patients aged 39-70: Has the patient had a colonoscopy / FIT/ Cologuard? No      If the patient is female:    4. For patients aged 41-77: Has the patient had a mammogram within the past 2 years? Yes - no Care Gap present      5. For patients aged 21-65: Has the patient had a pap smear?  No

## 2022-06-27 ENCOUNTER — HOSPITAL ENCOUNTER (OUTPATIENT)
Dept: LAB | Age: 57
Discharge: HOME | End: 2022-06-27
Payer: COMMERCIAL

## 2022-06-27 ENCOUNTER — HOSPITAL ENCOUNTER (OUTPATIENT)
Dept: PREADMISSION TESTING | Age: 57
Discharge: HOME | End: 2022-06-27
Payer: COMMERCIAL

## 2022-06-27 DIAGNOSIS — Z01.818 PRE-OP TESTING: ICD-10-CM

## 2022-06-27 DIAGNOSIS — I10 ESSENTIAL HYPERTENSION: ICD-10-CM

## 2022-06-27 DIAGNOSIS — E78.2 MIXED HYPERLIPIDEMIA: ICD-10-CM

## 2022-06-27 DIAGNOSIS — R73.03 PREDIABETES: ICD-10-CM

## 2022-06-27 DIAGNOSIS — E66.9 OBESE BODY HABITUS: ICD-10-CM

## 2022-06-27 LAB
ALBUMIN SERPL-MCNC: 3.7 G/DL (ref 3.4–5)
ALBUMIN/GLOB SERPL: 1 {RATIO} (ref 0.8–1.7)
ALP SERPL-CCNC: 84 U/L (ref 45–117)
ALT SERPL-CCNC: 40 U/L (ref 13–56)
ANION GAP SERPL CALC-SCNC: 4 MMOL/L (ref 3–18)
AST SERPL-CCNC: 37 U/L (ref 10–38)
ATRIAL RATE: 87 BPM
BILIRUB SERPL-MCNC: 0.6 MG/DL (ref 0.2–1)
BUN SERPL-MCNC: 21 MG/DL (ref 7–18)
BUN/CREAT SERPL: 26 (ref 12–20)
CALCIUM SERPL-MCNC: 9.5 MG/DL (ref 8.5–10.1)
CALCULATED P AXIS, ECG09: 28 DEGREES
CALCULATED R AXIS, ECG10: -2 DEGREES
CALCULATED T AXIS, ECG11: 25 DEGREES
CHLORIDE SERPL-SCNC: 107 MMOL/L (ref 100–111)
CHOLEST SERPL-MCNC: 145 MG/DL
CO2 SERPL-SCNC: 29 MMOL/L (ref 21–32)
CREAT SERPL-MCNC: 0.82 MG/DL (ref 0.6–1.3)
CREAT UR-MCNC: 144 MG/DL (ref 30–125)
DIAGNOSIS, 93000: NORMAL
EST. AVERAGE GLUCOSE BLD GHB EST-MCNC: 131 MG/DL
GLOBULIN SER CALC-MCNC: 3.8 G/DL (ref 2–4)
GLUCOSE SERPL-MCNC: 100 MG/DL (ref 74–99)
HBA1C MFR BLD: 6.2 % (ref 4.2–5.6)
HDLC SERPL-MCNC: 37 MG/DL (ref 40–60)
HDLC SERPL: 3.9 {RATIO} (ref 0–5)
LDLC SERPL CALC-MCNC: 90.8 MG/DL (ref 0–100)
LIPID PROFILE,FLP: ABNORMAL
MICROALBUMIN UR-MCNC: 0.82 MG/DL (ref 0–3)
MICROALBUMIN/CREAT UR-RTO: 6 MG/G (ref 0–30)
P-R INTERVAL, ECG05: 194 MS
POTASSIUM SERPL-SCNC: 4.1 MMOL/L (ref 3.5–5.5)
PROT SERPL-MCNC: 7.5 G/DL (ref 6.4–8.2)
Q-T INTERVAL, ECG07: 378 MS
QRS DURATION, ECG06: 82 MS
QTC CALCULATION (BEZET), ECG08: 454 MS
SODIUM SERPL-SCNC: 140 MMOL/L (ref 136–145)
TRIGL SERPL-MCNC: 86 MG/DL (ref ?–150)
TSH SERPL DL<=0.05 MIU/L-ACNC: 1.87 UIU/ML (ref 0.36–3.74)
VENTRICULAR RATE, ECG03: 87 BPM
VLDLC SERPL CALC-MCNC: 17.2 MG/DL

## 2022-06-27 PROCEDURE — 80053 COMPREHEN METABOLIC PANEL: CPT

## 2022-06-27 PROCEDURE — 36415 COLL VENOUS BLD VENIPUNCTURE: CPT

## 2022-06-27 PROCEDURE — 84443 ASSAY THYROID STIM HORMONE: CPT

## 2022-06-27 PROCEDURE — 80061 LIPID PANEL: CPT

## 2022-06-27 PROCEDURE — 82043 UR ALBUMIN QUANTITATIVE: CPT

## 2022-06-27 PROCEDURE — 83036 HEMOGLOBIN GLYCOSYLATED A1C: CPT

## 2022-06-27 PROCEDURE — 93005 ELECTROCARDIOGRAM TRACING: CPT

## 2022-06-30 RX ORDER — ERGOCALCIFEROL 1.25 MG/1
50000 CAPSULE ORAL
COMMUNITY
Start: 2022-04-22

## 2022-06-30 NOTE — PERIOP NOTES
Left VM for patient informing her her bmbx results are complete. Dr. Burleson is reviewing them with a colleague and will be in touch with her shortly.     Андрей Gandhi' PAT phone assessment completed on 7/6. The following instructions were reviewed with Андрей Gandhi. Андрей Gandhi verbalized understanding. 1. Do NOT eat or drink anything, including candy, gum, or ice chips after midnight on 7/6, unless you have specific instructions from your surgeon or anesthesia provider to do so.  2. You may brush your teeth before coming to the hospital.  3. No smoking 24 hours prior to the day of surgery. 4. No alcohol 24 hours prior to the day of surgery. 5. No recreational drugs for one week prior to the day of surgery. 6. Leave all valuables, including money/purse, at home. 7. Remove all jewelry, nail polish, acrylic nails, and makeup (including mascara); no lotions powders, deodorant, or perfume/cologne/after shave on the skin. 8. Glasses/contact lenses and dentures may be worn to the hospital.  They will be removed prior to surgery. 9. Call your doctor if symptoms of a cold or illness develop within 24-48 hours prior to your surgery. 10.  AN ADULT MUST DRIVE YOU HOME AFTER OUTPATIENT SURGERY. 11.  If you are having an outpatient procedure, please make arrangements for a responsible adult to be with you for 24 hours after your surgery. 12. ONE VISITOR in the hospital at this time for outpatient procedures. Exceptions may be made for surgical admissions, per hospital guidelines        Special Instructions:      Bring list of CURRENT medications. Bring CPAP machine. Bring any pertinent legal medical records. Take these medications the morning of surgery with a sip of water:  As directed by MD  Follow physician instructions about stopping anticoagulants. Complete bowel prep per MD instructions.

## 2022-07-05 ENCOUNTER — ANESTHESIA EVENT (OUTPATIENT)
Dept: ENDOSCOPY | Age: 57
End: 2022-07-05
Payer: MEDICAID

## 2022-07-06 ENCOUNTER — ANESTHESIA (OUTPATIENT)
Dept: ENDOSCOPY | Age: 57
End: 2022-07-06
Payer: MEDICAID

## 2022-07-06 ENCOUNTER — HOSPITAL ENCOUNTER (OUTPATIENT)
Age: 57
Setting detail: OUTPATIENT SURGERY
Discharge: HOME | End: 2022-07-06
Attending: COLON & RECTAL SURGERY | Admitting: COLON & RECTAL SURGERY
Payer: MEDICAID

## 2022-07-06 VITALS
BODY MASS INDEX: 46.12 KG/M2 | SYSTOLIC BLOOD PRESSURE: 127 MMHG | OXYGEN SATURATION: 97 % | WEIGHT: 287 LBS | RESPIRATION RATE: 16 BRPM | TEMPERATURE: 97.9 F | DIASTOLIC BLOOD PRESSURE: 84 MMHG | HEIGHT: 66 IN | HEART RATE: 87 BPM

## 2022-07-06 LAB — HCG UR QL: NEGATIVE

## 2022-07-06 PROCEDURE — 2709999900 HC NON-CHARGEABLE SUPPLY: Performed by: COLON & RECTAL SURGERY

## 2022-07-06 PROCEDURE — 74011000250 HC RX REV CODE- 250: Performed by: NURSE ANESTHETIST, CERTIFIED REGISTERED

## 2022-07-06 PROCEDURE — 77030008565 HC TBNG SUC IRR ERBE -B: Performed by: COLON & RECTAL SURGERY

## 2022-07-06 PROCEDURE — 77030011985 HC AIRWY NP COVD -A: Performed by: NURSE ANESTHETIST, CERTIFIED REGISTERED

## 2022-07-06 PROCEDURE — 74011250637 HC RX REV CODE- 250/637: Performed by: COLON & RECTAL SURGERY

## 2022-07-06 PROCEDURE — 76040000019: Performed by: COLON & RECTAL SURGERY

## 2022-07-06 PROCEDURE — 76060000031 HC ANESTHESIA FIRST 0.5 HR: Performed by: COLON & RECTAL SURGERY

## 2022-07-06 PROCEDURE — 00812 ANES LWR INTST SCR COLSC: CPT | Performed by: STUDENT IN AN ORGANIZED HEALTH CARE EDUCATION/TRAINING PROGRAM

## 2022-07-06 PROCEDURE — 74011250636 HC RX REV CODE- 250/636: Performed by: NURSE ANESTHETIST, CERTIFIED REGISTERED

## 2022-07-06 PROCEDURE — 81025 URINE PREGNANCY TEST: CPT

## 2022-07-06 PROCEDURE — 45378 DIAGNOSTIC COLONOSCOPY: CPT | Performed by: COLON & RECTAL SURGERY

## 2022-07-06 PROCEDURE — 00812 ANES LWR INTST SCR COLSC: CPT | Performed by: NURSE ANESTHETIST, CERTIFIED REGISTERED

## 2022-07-06 PROCEDURE — C1729 CATH, DRAINAGE: HCPCS | Performed by: COLON & RECTAL SURGERY

## 2022-07-06 RX ORDER — DEXTROMETHORPHAN/PSEUDOEPHED 2.5-7.5/.8
DROPS ORAL AS NEEDED
Status: DISCONTINUED | OUTPATIENT
Start: 2022-07-06 | End: 2022-07-06 | Stop reason: HOSPADM

## 2022-07-06 RX ORDER — MAGNESIUM SULFATE 100 %
4 CRYSTALS MISCELLANEOUS AS NEEDED
Status: CANCELLED | OUTPATIENT
Start: 2022-07-06

## 2022-07-06 RX ORDER — DIPHENHYDRAMINE HYDROCHLORIDE 50 MG/ML
12.5 INJECTION, SOLUTION INTRAMUSCULAR; INTRAVENOUS
Status: CANCELLED | OUTPATIENT
Start: 2022-07-06

## 2022-07-06 RX ORDER — SODIUM CHLORIDE 0.9 % (FLUSH) 0.9 %
5-40 SYRINGE (ML) INJECTION EVERY 8 HOURS
Status: CANCELLED | OUTPATIENT
Start: 2022-07-06

## 2022-07-06 RX ORDER — DEXTROSE MONOHYDRATE 100 MG/ML
0-250 INJECTION, SOLUTION INTRAVENOUS AS NEEDED
Status: CANCELLED | OUTPATIENT
Start: 2022-07-06

## 2022-07-06 RX ORDER — SODIUM CHLORIDE, SODIUM LACTATE, POTASSIUM CHLORIDE, CALCIUM CHLORIDE 600; 310; 30; 20 MG/100ML; MG/100ML; MG/100ML; MG/100ML
100 INJECTION, SOLUTION INTRAVENOUS CONTINUOUS
Status: CANCELLED | OUTPATIENT
Start: 2022-07-06

## 2022-07-06 RX ORDER — SODIUM CHLORIDE 0.9 % (FLUSH) 0.9 %
5-40 SYRINGE (ML) INJECTION EVERY 8 HOURS
Status: DISCONTINUED | OUTPATIENT
Start: 2022-07-06 | End: 2022-07-06 | Stop reason: HOSPADM

## 2022-07-06 RX ORDER — SODIUM CHLORIDE 0.9 % (FLUSH) 0.9 %
5-40 SYRINGE (ML) INJECTION AS NEEDED
Status: CANCELLED | OUTPATIENT
Start: 2022-07-06

## 2022-07-06 RX ORDER — PROPOFOL 10 MG/ML
INJECTION, EMULSION INTRAVENOUS AS NEEDED
Status: DISCONTINUED | OUTPATIENT
Start: 2022-07-06 | End: 2022-07-06 | Stop reason: HOSPADM

## 2022-07-06 RX ORDER — ONDANSETRON 2 MG/ML
4 INJECTION INTRAMUSCULAR; INTRAVENOUS
Status: CANCELLED | OUTPATIENT
Start: 2022-07-06 | End: 2022-07-07

## 2022-07-06 RX ORDER — LIDOCAINE HYDROCHLORIDE 20 MG/ML
INJECTION, SOLUTION EPIDURAL; INFILTRATION; INTRACAUDAL; PERINEURAL AS NEEDED
Status: DISCONTINUED | OUTPATIENT
Start: 2022-07-06 | End: 2022-07-06 | Stop reason: HOSPADM

## 2022-07-06 RX ORDER — SODIUM CHLORIDE 0.9 % (FLUSH) 0.9 %
5-40 SYRINGE (ML) INJECTION AS NEEDED
Status: DISCONTINUED | OUTPATIENT
Start: 2022-07-06 | End: 2022-07-06 | Stop reason: HOSPADM

## 2022-07-06 RX ORDER — INSULIN LISPRO 100 [IU]/ML
INJECTION, SOLUTION INTRAVENOUS; SUBCUTANEOUS ONCE
Status: CANCELLED | OUTPATIENT
Start: 2022-07-06 | End: 2022-07-06

## 2022-07-06 RX ORDER — SODIUM CHLORIDE, SODIUM LACTATE, POTASSIUM CHLORIDE, CALCIUM CHLORIDE 600; 310; 30; 20 MG/100ML; MG/100ML; MG/100ML; MG/100ML
75 INJECTION, SOLUTION INTRAVENOUS CONTINUOUS
Status: DISCONTINUED | OUTPATIENT
Start: 2022-07-06 | End: 2022-07-06 | Stop reason: HOSPADM

## 2022-07-06 RX ADMIN — LIDOCAINE HYDROCHLORIDE 40 MG: 20 INJECTION, SOLUTION EPIDURAL; INFILTRATION; INTRACAUDAL; PERINEURAL at 09:46

## 2022-07-06 RX ADMIN — PROPOFOL 50 MG: 10 INJECTION, EMULSION INTRAVENOUS at 09:59

## 2022-07-06 RX ADMIN — FAMOTIDINE 20 MG: 10 INJECTION, SOLUTION INTRAVENOUS at 09:35

## 2022-07-06 RX ADMIN — PROPOFOL 50 MG: 10 INJECTION, EMULSION INTRAVENOUS at 09:52

## 2022-07-06 RX ADMIN — PROPOFOL 50 MG: 10 INJECTION, EMULSION INTRAVENOUS at 09:50

## 2022-07-06 RX ADMIN — PROPOFOL 50 MG: 10 INJECTION, EMULSION INTRAVENOUS at 09:57

## 2022-07-06 RX ADMIN — PROPOFOL 50 MG: 10 INJECTION, EMULSION INTRAVENOUS at 09:55

## 2022-07-06 RX ADMIN — PROPOFOL 50 MG: 10 INJECTION, EMULSION INTRAVENOUS at 09:48

## 2022-07-06 RX ADMIN — SODIUM CHLORIDE, POTASSIUM CHLORIDE, SODIUM LACTATE AND CALCIUM CHLORIDE 75 ML/HR: 600; 310; 30; 20 INJECTION, SOLUTION INTRAVENOUS at 09:35

## 2022-07-06 RX ADMIN — PROPOFOL 50 MG: 10 INJECTION, EMULSION INTRAVENOUS at 09:46

## 2022-07-06 NOTE — H&P
HPI: Madhuri Kruse is a 56 y.o. female presenting with chief complain of need crc screen    Past Medical History:   Diagnosis Date    Ankle swelling 2021    Arthritis     Cancer ([address]     Essential hypertension 2021    High cholesterol     Hypertension     Malignant neoplasm of right female breast ([address] 2021    Dr Primitivo Siegel (including lymph nodes) chemo/radiation    Mixed hyperlipidemia 2021    Obese body habitus 2021    Sleep apnea     CPAP    Stress 2021       Past Surgical History:   Procedure Laterality Date    HX BILATERAL MASTECTOMY Bilateral 2021    HX BREAST BIOPSY Right     2020    HX BREAST LUMPECTOMY Left 2021    EXPLORATION LEFT MASTECTOMY AND EVACUATION OF HEMATOMA performed by Poonam Zuleta MD at [address] HX MASTECTOMY Bilateral 3/31/2021    BILATERAL MASTECTOMY; LEFT SENTINEL NODE BIOPSY; RIGHT AXILLARY DESSECTION performed by Poonam Zuleta MD at [address] HX VASCULAR ACCESS      IR INSERT TUNL CVC W PORT OVER 5 YEARS  2020    IR REMOVE TUNL CVAD W PORT/PUMP  2022       Family History   Problem Relation Age of Onset    Diabetes Mother     Colon Polyps Mother     Hypertension Father        Social History     Socioeconomic History    Marital status:    Tobacco Use    Smoking status: Former Smoker     Packs/day: 0.50     Years: 2.00     Pack years: 1.00     Quit date: 10/30/2020     Years since quittin.6    Smokeless tobacco: Never Used   Substance and Sexual Activity    Alcohol use: Not Currently    Drug use: Never    Sexual activity: Yes     Birth control/protection: None       Review of Systems - neg    Outpatient Medications Marked as Taking for the 22 encounter [facility] Encounter)   Medication Sig Dispense Refill    amLODIPine (NORVASC) 5 mg tablet Take 1 Tablet by mouth daily.  Indications: high blood pressure 90 Tablet 1    rosuvastatin (CRESTOR) 10 mg tablet Take 1 Tablet by mouth nightly. 90 Tablet 1    spironolactone (ALDACTONE) 25 mg tablet Take 1 Tablet by mouth daily. 90 Tablet 1    letrozole (FEMARA) 2.5 mg tablet Take 2.5 mg by mouth daily. No Known Allergies    Vitals:    06/30/22 1021   Weight: 130.2 kg (287 lb)   Height: 5' 6\" (1.676 m)       Physical Exam  Constitutional:       Appearance: She is well-developed. HENT:      Head: Normocephalic and atraumatic. Eyes:      Conjunctiva/sclera: Conjunctivae normal.   Abdominal:      General: There is no distension. Palpations: Abdomen is soft. Tenderness: There is no abdominal tenderness. Musculoskeletal:         General: Normal range of motion. Lymphadenopathy:      Cervical: No cervical adenopathy. Skin:     General: Skin is warm and dry. Findings: No rash. Neurological:      Sensory: No sensory deficit. Psychiatric:         Speech: Speech normal.         Assessment / Plan    colonoscopy    The diagnoses and plan were discussed with the patient. All questions answered. Plan of care agreed to by all concerned.

## 2022-07-06 NOTE — PROCEDURES
[facility]  [address], [address]   [place], [address]  [phone]                    Colonoscopy Procedure Note      Perla Meza  1965  [id number]                Date of Procedure: 7/6/2022    Preoperative diagnosis: Colon cancer Screening:  Z12.11    Postoperative diagnosis: Normal     :  Brown Palmer MD    Assistant(s): Endoscopy Technician-1: Rivera Grady Staff: Amauri Telles RN    Sedation: MAC    Complications: None    Implants: None    Procedure Details:  Prior to the procedure, a history and physical were performed. The patients medications, allergies and sensitivities were reviewed and all questions were answered. After informed consent was obtained for the procedure, with all risks and benefits of procedure explained. The patient was taken to the endoscopy suite and placed in the left lateral decubitus position. Patient identification and proposed procedure were verified prior to the procedure by the nurse and I. After sequential anesthesia administered by anesthesiologist, a digital rectal exam was performed and was normal.  The Olympus video colonoscope was introduced through the anus and advanced to cecum, which was identified by the ileocecal valve and appendiceal orifice. The quality of preparation was good. The colonoscope was slowly withdrawn and the mucosa examined for any abnormalities. Cecal withdrawal time was greater than 6 minutes. The patient tolerated the procedure well. There were no complications. Findings/Interventions:   Polyps - none    EBL: none    Recommendations: -For colon cancer screening in this average-risk patient, colonoscopy may be repeated in 10 years. Resume normal medication(s).      Discharge Disposition:  Garret Ivey MD  7/6/2022  10:05 AM

## 2022-07-06 NOTE — ANESTHESIA PREPROCEDURE EVALUATION
Relevant Problems   CARDIOVASCULAR   (+) Essential hypertension      PERSONAL HX & FAMILY HX OF CANCER   (+) Malignant neoplasm of right female breast (HCC)       Anesthetic History   No history of anesthetic complications            Review of Systems / Medical History  Patient summary reviewed, nursing notes reviewed and pertinent labs reviewed    Pulmonary        Sleep apnea: CPAP           Neuro/Psych   Within defined limits           Cardiovascular    Hypertension: well controlled                   GI/Hepatic/Renal  Within defined limits              Endo/Other        Arthritis     Other Findings              Physical Exam    Airway  Mallampati: III  TM Distance: 4 - 6 cm  Neck ROM: normal range of motion, short neck   Mouth opening: Normal     Cardiovascular    Rhythm: regular  Rate: normal         Dental  No notable dental hx       Pulmonary  Breath sounds clear to auscultation               Abdominal  GI exam deferred       Other Findings            Anesthetic Plan    ASA: 3  Anesthesia type: MAC          Induction: Intravenous  Anesthetic plan and risks discussed with: Patient

## 2022-07-06 NOTE — ANESTHESIA POSTPROCEDURE EVALUATION
Procedure(s):  COLONOSCOPY. MAC    Anesthesia Post Evaluation      Multimodal analgesia: multimodal analgesia used between 6 hours prior to anesthesia start to PACU discharge  Patient location during evaluation: PACU  Patient participation: complete - patient participated  Level of consciousness: sleepy but conscious  Pain management: adequate  Airway patency: patent  Anesthetic complications: no  Cardiovascular status: acceptable  Respiratory status: acceptable  Hydration status: acceptable  Post anesthesia nausea and vomiting:  controlled  Final Post Anesthesia Temperature Assessment:  Normothermia (36.0-37.5 degrees C)      INITIAL Post-op Vital signs:   Vitals Value Taken Time   BP 96/73 07/06/22 1013   Temp 36.3 °C (97.3 °F) 07/06/22 1013   Pulse 96 07/06/22 1016   Resp 20 07/06/22 1016   SpO2 95 % 07/06/22 1015   Vitals shown include unvalidated device data.

## 2022-07-06 NOTE — DISCHARGE INSTRUCTIONS
Findings/Interventions:   Polyps - none     EBL: none     Recommendations: -For colon cancer screening in this average-risk patient, colonoscopy may be repeated in 10 years. Resume normal medication(s).      Discharge Disposition:  Home      Colonoscopy: What to Expect at Home  Your Recovery  After you have a colonoscopy, you will stay at the clinic for 1 to 2 hours until the medicines wear off. Then you can go home. But you will need to arrange for a ride. Your doctor will tell you when you can eat and do your other usual activities. Your doctor will talk to you about when you will need your next colonoscopy. Your doctor can help you decide how often you need to be checked. This will depend on the results of your test and your risk for colorectal cancer. After the test, you may be bloated or have gas pains. You may need to pass gas. If a biopsy was done or a polyp was removed, you may have streaks of blood in your stool (feces) for a few days. This care sheet gives you a general idea about how long it will take for you to recover. But each person recovers at a different pace. Follow the steps below to get better as quickly as possible. How can you care for yourself at home? Activity  · Rest when you feel tired. · You can do your normal activities when it feels okay to do so. Diet  · Follow your doctor's directions for eating. · Unless your doctor has told you not to, drink plenty of fluids. This helps to replace the fluids that were lost during the colon prep. · Do not drink alcohol. Medicines  · Your doctor will tell you if and when you can restart your medicines. He or she will also give you instructions about taking any new medicines. · If you take blood thinners, such as warfarin (Coumadin), clopidogrel (Plavix), or aspirin, be sure to talk to your doctor. He or she will tell you if and when to start taking those medicines again.  Make sure that you understand exactly what your doctor wants you to do.  · If polyps were removed or a biopsy was done during the test, your doctor may tell you not to take aspirin or other anti-inflammatory medicines for a few days. These include ibuprofen (Advil, Motrin) and naproxen (Aleve). Other instructions  · For your safety, do not drive or operate machinery until the medicine wears off and you can think clearly. Your doctor may tell you not to drive or operate machinery until the day after your test.  · Do not sign legal documents or make major decisions until the medicine wears off and you can think clearly. The anesthesia can make it hard for you to fully understand what you are agreeing to. Follow-up care is a key part of your treatment and safety. Be sure to make and go to all appointments, and call your doctor if you are having problems. It's also a good idea to know your test results and keep a list of the medicines you take. When should you call for help? Call 911 anytime you think you may need emergency care. For example, call if:  · You passed out (lost consciousness). · You pass maroon or bloody stools. · You have severe belly pain. Call your doctor now or seek immediate medical care if:  · Your stools are black and tarlike. · Your stools have streaks of blood, but you did not have a biopsy or any polyps removed. · You have belly pain, or your belly is swollen and firm. · You vomit. · You have a fever. · You are very dizzy. Watch closely for changes in your health, and be sure to contact your doctor if you have any problems. Where can you learn more? Go to [employer]  Enter E264 in the search box to learn more about \"Colonoscopy: What to Expect at Home. \"   © 2709-7101 [employer]. Care instructions adapted under license by [facility] (which disclaims liability or warranty for this information).  This care instruction is for use with your licensed healthcare professional. If you have questions about a medical condition or this instruction, always ask your healthcare professional. [place] [address] any warranty or liability for your use of this information. Content Version: 50.0.256865; Current as of: November 20, 2015                  DISCHARGE SUMMARY from Nurse     POST-PROCEDURE INSTRUCTIONS:    Call your Physician if you:  ? Observe any excess bleeding. ? Develop a temperature over 100.5o F.  ? Experience abdominal, shoulder or chest pain. ? Notice any signs of decreased circulation or nerve impairment to an extremity such as a change in color, persistent numbness, tingling, coldness or increase in pain. ? Vomit blood or you have nausea and vomiting lasting longer than 4 hours. ? Are unable to take medications. ? Are unable to urinate within 8 hours after discharge following general anesthesia or intravenous sedation. For the next 24 hours after receiving general anesthesia or intravenous sedation, or while taking prescription Narcotics, limit your activities:  ? Do NOT drive a motor vehicle, operate hazard machinery or power tools, or perform tasks that require coordination. The medication you received during your procedure may have some effect on your mental awareness. ? Do NOT make important personal or business decisions. The medication you received during your procedure may have some effect on your mental awareness. ? Do NOT drink alcoholic beverages. These drinks do not mix well with the medications that have been given to you. ? Upon discharge from the hospital, you must be accompanied by a responsible adult. ? Resume your diet as directed by your physician. ? Resume medications as your physician has prescribed. ? Please give a list of your current medications to your Primary Care Provider. ? Please update this list whenever your medications are discontinued, doses are changed, or new medications (including over-the-counter products) are added. ?  Please carry medication information at all times in case of emergency situations. These are general instructions for a healthy lifestyle:    No smoking/ No tobacco products/ Avoid exposure to second hand smoke.  Surgeon General's Warning:  Quitting smoking now greatly reduces serious risk to your health. Obesity, smoking, and a sedentary lifestyle greatly increase your risk for illness.  A healthy diet, regular physical exercise & weight monitoring are important for maintaining a healthy lifestyle   You may be retaining fluid if you have a history of heart failure or if you experience any of the following symptoms:  Weight gain of 3 pounds or more overnight or 5 pounds in a week, increased swelling in our hands or feet or shortness of breath while lying flat in bed. Please call your doctor as soon as you notice any of these symptoms; do not wait until your next office visit. Recognize signs and symptoms of STROKE:  F  -  Face looks uneven  A  -  Arms unable to move or move unevenly  S  -  Speech slurred or non-existent  T  -  Time to call 911 - as soon as signs and symptoms begin - DO NOT go back to bed or wait to see If you get better - TIME IS BRAIN. Colorectal Screening   Colorectal cancer almost always develops from precancerous polyps (abnormal growths) in the colon or rectum. Screening tests can find precancerous polyps, so that they can be removed before they turn into cancer. Screening tests can also find colorectal cancer early, when treatment works best.  [facility] Speak with your physician about when you should begin screening and how often you should be tested. Educational references and/or instructions provided during this visit included:    normal      APPOINTMENTS:    Please make a follow-up appointment with your physician. Discharge information has been reviewed with the patient. The patient verbalized understanding.

## 2022-08-05 ENCOUNTER — HOSPITAL ENCOUNTER (OUTPATIENT)
Dept: RADIATION THERAPY | Age: 57
Discharge: HOME | End: 2022-08-05
Payer: MEDICAID

## 2022-08-05 PROCEDURE — 99211 OFF/OP EST MAY X REQ PHY/QHP: CPT

## 2022-08-05 PROCEDURE — 99212 OFFICE O/P EST SF 10 MIN: CPT | Performed by: RADIOLOGY

## 2022-08-20 ENCOUNTER — TRANSCRIBE ORDER (OUTPATIENT)
Dept: SCHEDULING | Age: 57
End: 2022-08-20

## 2022-08-20 DIAGNOSIS — C50.811 MALIGNANT NEOPLASM OF OVERLAPPING SITES OF RIGHT FEMALE BREAST (HCC): Primary | ICD-10-CM

## 2022-08-29 ENCOUNTER — HOSPITAL ENCOUNTER (OUTPATIENT)
Dept: CT IMAGING | Age: 57
Discharge: HOME | End: 2022-08-29
Attending: INTERNAL MEDICINE
Payer: MEDICAID

## 2022-08-29 DIAGNOSIS — C50.811 MALIGNANT NEOPLASM OF OVERLAPPING SITES OF RIGHT FEMALE BREAST (HCC): ICD-10-CM

## 2022-08-29 PROCEDURE — 74177 CT ABD & PELVIS W/CONTRAST: CPT

## 2022-08-29 PROCEDURE — 74011000636 HC RX REV CODE- 636: Performed by: INTERNAL MEDICINE

## 2022-08-29 PROCEDURE — 82565 ASSAY OF CREATININE: CPT

## 2022-08-29 RX ADMIN — IOPAMIDOL 100 ML: 612 INJECTION, SOLUTION INTRAVENOUS at 12:58

## 2022-09-01 LAB — CREAT UR-MCNC: 0.7 MG/DL (ref 0.6–1.3)

## 2022-10-18 DIAGNOSIS — E78.2 MIXED HYPERLIPIDEMIA: ICD-10-CM

## 2022-10-18 DIAGNOSIS — I10 ESSENTIAL HYPERTENSION: ICD-10-CM

## 2022-10-18 RX ORDER — AMLODIPINE BESYLATE 5 MG/1
TABLET ORAL
Qty: 90 TABLET | Refills: 1 | OUTPATIENT
Start: 2022-10-18

## 2022-10-18 RX ORDER — ROSUVASTATIN CALCIUM 10 MG/1
TABLET, COATED ORAL
Qty: 90 TABLET | Refills: 1 | OUTPATIENT
Start: 2022-10-18

## 2022-10-18 NOTE — TELEPHONE ENCOUNTER
According to RX from Pharm, these prescriptions will filled 9/28/22. Pt should have enough meds to hold her until her 11/11/22 appt.     Requested Prescriptions     Refused Prescriptions Disp Refills    rosuvastatin (CRESTOR) 10 mg tablet [Pharmacy Med Name: rosuvastatin 10 mg tablet] 90 Tablet 1     Sig: TAKE ONE TABLET BY MOUTH ONCE NIGHTLY     Refused By: Yani Bateman     Reason for Refusal: Patient has requested refill too soon    amLODIPine (NORVASC) 5 mg tablet [Pharmacy Med Name: amlodipine 5 mg tablet] 90 Tablet 1     Sig: TAKE ONE TABLET BY MOUTH ONCE DAILY     Refused By: Yani Bateman     Reason for Refusal: Patient has requested refill too soon

## 2022-11-03 ENCOUNTER — HOSPITAL ENCOUNTER (EMERGENCY)
Age: 57
Discharge: HOME | End: 2022-11-03
Attending: EMERGENCY MEDICINE
Payer: MEDICAID

## 2022-11-03 VITALS
HEART RATE: 80 BPM | WEIGHT: 293 LBS | BODY MASS INDEX: 47.09 KG/M2 | RESPIRATION RATE: 15 BRPM | OXYGEN SATURATION: 97 % | DIASTOLIC BLOOD PRESSURE: 81 MMHG | SYSTOLIC BLOOD PRESSURE: 123 MMHG | TEMPERATURE: 98.7 F | HEIGHT: 66 IN

## 2022-11-03 DIAGNOSIS — K02.9 DENTAL CARIES: ICD-10-CM

## 2022-11-03 DIAGNOSIS — K08.89 TOOTHACHE: Primary | ICD-10-CM

## 2022-11-03 PROCEDURE — 99283 EMERGENCY DEPT VISIT LOW MDM: CPT

## 2022-11-03 RX ORDER — LIDOCAINE HYDROCHLORIDE 20 MG/ML
5 SOLUTION ORAL; TOPICAL
Qty: 200 ML | Refills: 0 | Status: SHIPPED | OUTPATIENT
Start: 2022-11-03

## 2022-11-03 RX ORDER — CHLORHEXIDINE GLUCONATE 1.2 MG/ML
15 RINSE ORAL EVERY 12 HOURS
Qty: 420 ML | Refills: 0 | Status: SHIPPED | OUTPATIENT
Start: 2022-11-03 | End: 2022-11-17

## 2022-11-03 RX ORDER — OXYCODONE AND ACETAMINOPHEN 5; 325 MG/1; MG/1
1 TABLET ORAL
Qty: 12 TABLET | Refills: 0 | Status: SHIPPED | OUTPATIENT
Start: 2022-11-03 | End: 2022-11-06

## 2022-11-03 RX ORDER — IBUPROFEN 600 MG/1
600 TABLET ORAL EVERY 8 HOURS
Qty: 15 TABLET | Refills: 0 | Status: SHIPPED | OUTPATIENT
Start: 2022-11-03

## 2022-11-03 RX ORDER — PENICILLIN V POTASSIUM 500 MG/1
500 TABLET, FILM COATED ORAL 4 TIMES DAILY
Qty: 40 TABLET | Refills: 0 | Status: SHIPPED | OUTPATIENT
Start: 2022-11-03 | End: 2022-11-13

## 2022-11-03 NOTE — ED PROVIDER NOTES
EMERGENCY DEPARTMENT HISTORY AND PHYSICAL EXAM    Date: 11/3/2022  Patient Name: Kyra Esparza    History of Presenting Illness     Chief Complaint   Patient presents with    Dental Pain         History Provided By: Patient        Additional History (Context): Kyra Esparza is a 56 y.o. female with hypertension, hyperlipidemia, obesity, and breast cancer  who presents with left upper dental pain for several months worse today and intolerance of pain. Has a dental appointment scheduled for next Monday in the Monday following that. Patient had a dentist but apparently she said that dentist had some family issues and is no longer taking patients. Wants to have her tooth extracted as it is causing so much pain which she describes as severe. Pain is worse with any kind of p.o. Denies fever or facial swelling. PCP: Camryn Stephens DNP    Current Outpatient Medications   Medication Sig Dispense Refill    penicillin v potassium (VEETID) 500 mg tablet Take 1 Tablet by mouth four (4) times daily for 10 days. 40 Tablet 0    ibuprofen (MOTRIN) 600 mg tablet Take 1 Tablet by mouth every eight (8) hours. 15 Tablet 0    Lidocaine Viscous 2 % solution Take 5 mL by mouth two (2) times daily as needed for Pain. Put 5-10ml onto gauze and put in mouth; hold for 30min. Can repeat up to twice daily. Indications: administration of local anesthetic drug 200 mL 0    oxyCODONE-acetaminophen (Percocet) 5-325 mg per tablet Take 1 Tablet by mouth every six (6) hours as needed for Pain for up to 3 days. Max Daily Amount: 4 Tablets. 12 Tablet 0    chlorhexidine (Peridex) 0.12 % solution 15 mL by Swish and Spit route every twelve (12) hours for 14 days. 420 mL 0    Vitamin D2 1,250 mcg (50,000 unit) capsule Take 50,000 Units by mouth every seven (7) days. amLODIPine (NORVASC) 5 mg tablet Take 1 Tablet by mouth daily.  Indications: high blood pressure 90 Tablet 1    rosuvastatin (CRESTOR) 10 mg tablet Take 1 Tablet by mouth nightly. 90 Tablet 1    spironolactone (ALDACTONE) 25 mg tablet Take 1 Tablet by mouth daily. 90 Tablet 1    bisacodyL (Dulcolax, bisacodyl,) 5 mg EC tablet Take tablets as directed for bowel prep. 4 Tablet 0    letrozole (FEMARA) 2.5 mg tablet Take 2.5 mg by mouth daily. Past History     Past Medical History:  Past Medical History:   Diagnosis Date    Ankle swelling 2021    Arthritis     Cancer ([facility]     Essential hypertension 2021    High cholesterol     Hypertension     [facility] 10/20/2020    Dr Master Meredith (including lymph nodes) chemo/radiation.   T4dN2,ER/[place]+,HER2-    Mixed hyperlipidemia 2021    Obese body habitus 2021    Sleep apnea     CPAP    Stress 2021       Past Surgical History:  Past Surgical History:   Procedure Laterality Date    COLONOSCOPY N/A 2022    COLONOSCOPY performed by Margaret Fraser MD at [facility] Bilateral 2021    HX BREAST BIOPSY Right     2020    HX BREAST LUMPECTOMY Left 2021    EXPLORATION LEFT MASTECTOMY AND EVACUATION OF HEMATOMA performed by Moira Archer MD at [address] [place] [address]    HX MASTECTOMY Bilateral 3/31/2021    BILATERAL MASTECTOMY; LEFT SENTINEL NODE BIOPSY; RIGHT AXILLARY DESSECTION performed by Moira Archer MD at [facility] OR    HX VASCULAR ACCESS      IR INSERT TUNL CVC W PORT OVER 5 YEARS  2020    IR [de-identified] TUNL CVAD W PORT/PUMP  2022       Family History:  Family History   Problem Relation Age of Onset    Diabetes Mother     Colon Polyps Mother     Hypertension Father        Social History:  Social History     Tobacco Use    Smoking status: Former     Packs/day: 0.50     Years: 2.00     Pack years: 1.00     Types: Cigarettes     Quit date: 10/30/2020     Years since quittin.0    Smokeless tobacco: Never   Substance Use Topics    Alcohol use: Not Currently    Drug use: Never       Allergies:  No Known Allergies      Review of Systems   Review of Systems   Constitutional:  Negative for fever. HENT:  Positive for dental problem. Negative for drooling and facial swelling. All Other Systems Negative  Physical Exam     Vitals:    11/03/22 1124   BP: 123/81   Pulse: 80   Resp: 15   Temp: 98.7 °F (37.1 °C)   SpO2: 97%   Weight: 145 kg (319 lb 10.7 oz)   Height: 5' 6\" (1.676 m)     Physical Exam  Vitals and nursing note reviewed. Constitutional:       Appearance: She is well-developed. She is obese. HENT:      Head: Normocephalic and atraumatic. Mouth/Throat:      Comments: Dental: Left upper second molar is eroded and tender without any adjacent gingival fluctuance seen or palpated. Eyes:      Pupils: Pupils are equal, round, and reactive to light. Neck:      Thyroid: No thyromegaly. Vascular: No JVD. Trachea: No tracheal deviation. Cardiovascular:      Rate and Rhythm: Normal rate and regular rhythm. Heart sounds: Normal heart sounds. No murmur heard. No friction rub. No gallop. Pulmonary:      Effort: Pulmonary effort is normal. No respiratory distress. Breath sounds: Normal breath sounds. No stridor. No wheezing or rales. Chest:      Chest wall: No tenderness. Abdominal:      General: There is no distension. Palpations: Abdomen is soft. There is no mass. Tenderness: There is no abdominal tenderness. There is no guarding or rebound. Musculoskeletal:         General: No tenderness. Lymphadenopathy:      Cervical: No cervical adenopathy. Skin:     General: Skin is warm and dry. Coloration: Skin is not pale. Findings: No erythema or rash. Neurological:      Mental Status: She is alert and oriented to person, place, and time. Psychiatric:         Behavior: Behavior normal.         Thought Content: Thought content normal.          Diagnostic Study Results     Labs -   No results found for this or any previous visit (from the past 12 hour(s)).     Radiologic Studies -   No orders to display     CT Results  (Last 48 hours)      None          CXR Results  (Last 48 hours)      None              Medical Decision Making   I am the first provider for this patient. I reviewed the vital signs, available nursing notes, past medical history, past surgical history, family history and social history. Vital Signs-Reviewed the patient's vital signs. Records Reviewed: Nursing Notes    Procedures:  Procedures    Provider Notes (Medical Decision Making):     MED RECONCILIATION:  No current facility-administered medications for this encounter. Current Outpatient Medications   Medication Sig    penicillin v potassium (VEETID) 500 mg tablet Take 1 Tablet by mouth four (4) times daily for 10 days. ibuprofen (MOTRIN) 600 mg tablet Take 1 Tablet by mouth every eight (8) hours. Lidocaine Viscous 2 % solution Take 5 mL by mouth two (2) times daily as needed for Pain. Put 5-10ml onto gauze and put in mouth; hold for 30min. Can repeat up to twice daily. Indications: administration of local anesthetic drug    oxyCODONE-acetaminophen (Percocet) 5-325 mg per tablet Take 1 Tablet by mouth every six (6) hours as needed for Pain for up to 3 days. Max Daily Amount: 4 Tablets. chlorhexidine (Peridex) 0.12 % solution 15 mL by Swish and Spit route every twelve (12) hours for 14 days. Vitamin D2 1,250 mcg (50,000 unit) capsule Take 50,000 Units by mouth every seven (7) days. amLODIPine (NORVASC) 5 mg tablet Take 1 Tablet by mouth daily. Indications: high blood pressure    rosuvastatin (CRESTOR) 10 mg tablet Take 1 Tablet by mouth nightly. spironolactone (ALDACTONE) 25 mg tablet Take 1 Tablet by mouth daily. bisacodyL (Dulcolax, bisacodyl,) 5 mg EC tablet Take tablets as directed for bowel prep.    letrozole (FEMARA) 2.5 mg tablet Take 2.5 mg by mouth daily. Disposition:  home    DISCHARGE NOTE:   11:27 AM    Pt has been reexamined.   Patient has no new complaints, changes, or physical findings. Care plan outlined and precautions discussed. Results of exam were reviewed with the patient. All medications were reviewed with the patient; will d/c home with see below. All of pt's questions and concerns were addressed. Patient was instructed and agrees to follow up with dental, as well as to return to the ED upon further deterioration. Patient is ready to go home. Follow-up Information       Follow up With Specialties Details Why [facility]  Schedule an appointment as soon as possible for a visit today  [address] [facility]  Schedule an appointment as soon as possible for a visit today  [address] [facility] [address] [facility],[address]    [facility] EMERGENCY DEPT Emergency Medicine  If symptoms worsen return immediately [address] [facility] [address]  [phone]            Current Discharge Medication List        START taking these medications    Details   penicillin v potassium (VEETID) 500 mg tablet Take 1 Tablet by mouth four (4) times daily for 10 days. Qty: 40 Tablet, Refills: 0  Start date: 11/3/2022, End date: 11/13/2022      ibuprofen (MOTRIN) 600 mg tablet Take 1 Tablet by mouth every eight (8) hours. Qty: 15 Tablet, Refills: 0  Start date: 11/3/2022      Lidocaine Viscous 2 % solution Take 5 mL by mouth two (2) times daily as needed for Pain. Put 5-10ml onto gauze and put in mouth; hold for 30min. Can repeat up to twice daily. Indications: administration of local anesthetic drug  Qty: 200 mL, Refills: 0  Start date: 11/3/2022      oxyCODONE-acetaminophen (Percocet) 5-325 mg per tablet Take 1 Tablet by mouth every six (6) hours as needed for Pain for up to 3 days. Max Daily Amount: 4 Tablets.   Qty: 12 Tablet, Refills: 0  Start date: 11/3/2022, End date: 11/6/2022    Comments: ED Attending: Humphrey Johnson DO  MARIN: [id number]  Associated Diagnoses: Toothache      chlorhexidine (Peridex) 0.12 % solution 15 mL by Swish and Spit route every twelve (12) hours for 14 days. Qty: 420 mL, Refills: 0  Start date: 11/3/2022, End date: 11/17/2022             Diagnosis     Clinical Impression:   1. Toothache    2.  Dental caries

## 2022-11-04 ENCOUNTER — LAB ONLY (OUTPATIENT)
Dept: INTERNAL MEDICINE CLINIC | Age: 57
End: 2022-11-04

## 2022-11-04 ENCOUNTER — HOSPITAL ENCOUNTER (OUTPATIENT)
Dept: LAB | Age: 57
Discharge: HOME | End: 2022-11-04
Payer: MEDICAID

## 2022-11-04 DIAGNOSIS — R73.03 PREDIABETES: ICD-10-CM

## 2022-11-04 DIAGNOSIS — E78.2 MIXED HYPERLIPIDEMIA: ICD-10-CM

## 2022-11-04 DIAGNOSIS — I10 ESSENTIAL HYPERTENSION: ICD-10-CM

## 2022-11-04 DIAGNOSIS — I10 ESSENTIAL HYPERTENSION: Primary | ICD-10-CM

## 2022-11-04 LAB
ALBUMIN SERPL-MCNC: 4 G/DL (ref 3.4–5)
ALBUMIN/GLOB SERPL: 1.1 {RATIO} (ref 0.8–1.7)
ALP SERPL-CCNC: 84 U/L (ref 45–117)
ALT SERPL-CCNC: 35 U/L (ref 13–56)
ANION GAP SERPL CALC-SCNC: 4 MMOL/L (ref 3–18)
AST SERPL-CCNC: 25 U/L (ref 10–38)
BILIRUB SERPL-MCNC: 0.8 MG/DL (ref 0.2–1)
BUN SERPL-MCNC: 19 MG/DL (ref 7–18)
BUN/CREAT SERPL: 22 (ref 12–20)
CALCIUM SERPL-MCNC: 9.8 MG/DL (ref 8.5–10.1)
CHLORIDE SERPL-SCNC: 103 MMOL/L (ref 100–111)
CHOLEST SERPL-MCNC: 163 MG/DL
CO2 SERPL-SCNC: 33 MMOL/L (ref 21–32)
CREAT SERPL-MCNC: 0.85 MG/DL (ref 0.6–1.3)
EST. AVERAGE GLUCOSE BLD GHB EST-MCNC: 140 MG/DL
GLOBULIN SER CALC-MCNC: 3.5 G/DL (ref 2–4)
GLUCOSE SERPL-MCNC: 111 MG/DL (ref 74–99)
HBA1C MFR BLD: 6.5 % (ref 4.2–5.6)
HDLC SERPL-MCNC: 36 MG/DL (ref 40–60)
HDLC SERPL: 4.5 {RATIO} (ref 0–5)
LDLC SERPL CALC-MCNC: 108.4 MG/DL (ref 0–100)
LIPID PROFILE,FLP: ABNORMAL
POTASSIUM SERPL-SCNC: 3.4 MMOL/L (ref 3.5–5.5)
PROT SERPL-MCNC: 7.5 G/DL (ref 6.4–8.2)
SODIUM SERPL-SCNC: 140 MMOL/L (ref 136–145)
TRIGL SERPL-MCNC: 93 MG/DL (ref ?–150)
VLDLC SERPL CALC-MCNC: 18.6 MG/DL

## 2022-11-04 PROCEDURE — 80061 LIPID PANEL: CPT

## 2022-11-04 PROCEDURE — 36415 COLL VENOUS BLD VENIPUNCTURE: CPT

## 2022-11-04 PROCEDURE — 83036 HEMOGLOBIN GLYCOSYLATED A1C: CPT

## 2022-11-04 PROCEDURE — 80053 COMPREHEN METABOLIC PANEL: CPT

## 2022-11-10 NOTE — PROGRESS NOTES
CPE appointment 11/11/2022  A1c 6.2-6.5  TG 93; LDL   Hypokalemia 3.4.   Past 3 previous readings have been within normal limits

## 2022-11-11 ENCOUNTER — OFFICE VISIT (OUTPATIENT)
Dept: INTERNAL MEDICINE CLINIC | Age: 57
End: 2022-11-11
Payer: MEDICAID

## 2022-11-11 VITALS
BODY MASS INDEX: 47.09 KG/M2 | RESPIRATION RATE: 16 BRPM | HEIGHT: 66 IN | DIASTOLIC BLOOD PRESSURE: 67 MMHG | TEMPERATURE: 97 F | SYSTOLIC BLOOD PRESSURE: 125 MMHG | HEART RATE: 92 BPM | OXYGEN SATURATION: 98 % | WEIGHT: 293 LBS

## 2022-11-11 DIAGNOSIS — I10 ESSENTIAL HYPERTENSION: ICD-10-CM

## 2022-11-11 DIAGNOSIS — Z00.00 ANNUAL PHYSICAL EXAM: Primary | ICD-10-CM

## 2022-11-11 DIAGNOSIS — E11.65 TYPE 2 DIABETES MELLITUS WITH HYPERGLYCEMIA, WITHOUT LONG-TERM CURRENT USE OF INSULIN (HCC): ICD-10-CM

## 2022-11-11 DIAGNOSIS — E87.6 HYPOKALEMIA: ICD-10-CM

## 2022-11-11 DIAGNOSIS — Z23 NEEDS FLU SHOT: ICD-10-CM

## 2022-11-11 DIAGNOSIS — K04.7 TOOTH INFECTION: ICD-10-CM

## 2022-11-11 DIAGNOSIS — E78.2 MIXED HYPERLIPIDEMIA: ICD-10-CM

## 2022-11-11 PROCEDURE — 3074F SYST BP LT 130 MM HG: CPT | Performed by: NURSE PRACTITIONER

## 2022-11-11 PROCEDURE — 90686 IIV4 VACC NO PRSV 0.5 ML IM: CPT | Performed by: NURSE PRACTITIONER

## 2022-11-11 PROCEDURE — 3078F DIAST BP <80 MM HG: CPT | Performed by: NURSE PRACTITIONER

## 2022-11-11 PROCEDURE — 99396 PREV VISIT EST AGE 40-64: CPT | Performed by: NURSE PRACTITIONER

## 2022-11-11 RX ORDER — ROSUVASTATIN CALCIUM 20 MG/1
20 TABLET, COATED ORAL
Qty: 90 TABLET | Refills: 1 | Status: SHIPPED | OUTPATIENT
Start: 2022-11-11

## 2022-11-11 RX ORDER — INSULIN PUMP SYRINGE, 3 ML
EACH MISCELLANEOUS
Qty: 1 KIT | Refills: 0 | Status: SHIPPED | OUTPATIENT
Start: 2022-11-11

## 2022-11-11 RX ORDER — LANCETS
EACH MISCELLANEOUS
Qty: 1 EACH | Refills: 11 | Status: SHIPPED | OUTPATIENT
Start: 2022-11-11

## 2022-11-11 RX ORDER — METFORMIN HYDROCHLORIDE 500 MG/1
500 TABLET ORAL
Qty: 90 TABLET | Refills: 1 | Status: SHIPPED | OUTPATIENT
Start: 2022-11-11

## 2022-11-11 RX ORDER — IBUPROFEN 200 MG
CAPSULE ORAL
Qty: 100 STRIP | Refills: 5 | Status: SHIPPED | OUTPATIENT
Start: 2022-11-11

## 2022-11-11 RX ORDER — SPIRONOLACTONE 25 MG/1
25 TABLET ORAL DAILY
Qty: 90 TABLET | Refills: 1 | Status: SHIPPED | OUTPATIENT
Start: 2022-11-11

## 2022-11-11 RX ORDER — AMLODIPINE BESYLATE 5 MG/1
5 TABLET ORAL DAILY
Qty: 90 TABLET | Refills: 1 | Status: SHIPPED | OUTPATIENT
Start: 2022-11-11

## 2022-11-11 NOTE — PROGRESS NOTES
Chief Complaint   Patient presents with    Labs     Completed 11/4/2022    Physical     1. \"Have you been to the ER, urgent care clinic since your last visit? Hospitalized since your last visit? \" Yes ER for toothache    2. \"Have you seen or consulted any other health care providers outside of the [address] [place] [address] since your last visit? \" No     3. For patients aged 39-70: Has the patient had a colonoscopy / FIT/ Cologuard? Yes - Care Gap present. Most recent result on file      If the patient is female:    4. For patients aged 41-77: Has the patient had a mammogram within the past 2 years? Yes - no Care Gap present      5. For patients aged 21-65: Has the patient had a pap smear?  No

## 2022-11-11 NOTE — PROGRESS NOTES
Reason for Visit:    Chief Complaint   Patient presents with    Labs     Completed 11/4/2022    Physical       HPI: Dereje Epperson presents today for annual physical.    Tooth infection. Left upper #15 and 16.  Currently taking penicillin for infection. Other treatments lidocaine viscous and Peridex. Pain to her teeth 5-6/10. She has ibuprofen 600 mg available but has been taking Tylenol instead. She also has Tylenol with codeine at home but does not want to take this medication due to fear of addiction. She has an appointment with the dentist on 11/19/2022 to have teeth pulled. Obesity. Has not been very active. Was on Nutrisystem and weight was reduced to 287 pounds. After a month of Nutrisystem she noted weight gain so she quit the program.  Her actual weight today is 308 pounds. She understands the importance of proper diet and exercise and will work on both. Right breast cancer: DX Sept 2020. Underwent bilateral breast surgery (mastectomy) with Dr Andre Martinez. She was noted to have + lymph nodes to both breasts. She completed chemo back in June 2020. She continues Femara treatment under Dr. Gigi Barnard. Hypertension. Taking amlodipine and spironolactone daily and tolerating well. Denies side effects or complications with medication. Denies shortness of breath, chest pain, fatigue, edema, syncope. Cholesterol. Taking statin therapy. Denies myalgia. Not focusing on a low-cholesterol diet. HM:  Mammogram-had bilateral mastectomy.   Mammograms no longer needed  Last Pap smear August/2022 by Dr. Sharon Wilhelm appointment scheduled for January 2023 with [facility]  Tdap-will obtain from local pharmacy  Influenza vaccine-seeking to receive today    Problem List:  Patient Active Problem List   Diagnosis Code    [facility] C50.911    Obese body habitus E66.9    Stress F43.9    Mixed hyperlipidemia E78.2    Ankle swelling M25.473    Essential hypertension I10 Hypokalemia E87.6    Prediabetes R73.03       Past Medical History:    Past Medical History:   Diagnosis Date    Ankle swelling 05/24/2021    Arthritis     Cancer ([facility]     Essential hypertension 05/24/2021    High cholesterol     Hypertension     Malignant neoplasm of right female breast ([facility] 10/20/2020    Dr Martha Cartagena (including lymph nodes) chemo/radiation. T4dN2,ER/MT+,HER2-    Mixed hyperlipidemia 05/24/2021    Obese body habitus 05/24/2021    Sleep apnea     CPAP    Stress 05/24/2021       Past Surgical History:  Past Surgical History:   Procedure Laterality Date    COLONOSCOPY N/A 7/6/2022    COLONOSCOPY performed by Ronen Duke MD at [facility] Bilateral 03/31/2021    HX BREAST BIOPSY Right     9/2020    HX BREAST LUMPECTOMY Left 4/1/2021    EXPLORATION LEFT MASTECTOMY AND EVACUATION OF HEMATOMA performed by Rich Marrero MD at [address] [place] [address]    HX MASTECTOMY Bilateral 3/31/2021    BILATERAL MASTECTOMY; LEFT SENTINEL NODE BIOPSY; RIGHT AXILLARY DESSECTION performed by Rich Marrero MD at [facility] OR    HX VASCULAR ACCESS      IR INSERT TUNL CVC W PORT OVER 5 YEARS  11/4/2020    IR [de-identified] TUNL CVAD W PORT/PUMP  1/25/2022       Family History:  Family History   Problem Relation Age of Onset    Diabetes Mother     Colon Polyps Mother     Hypertension Father        Immunizations:  Immunization History   Administered Date(s) Administered    COVID-19, MODERNA BLUE border, Primary or Immunocompromised, (age 18y+), IM, 100 mcg/0.5mL 03/05/2021, 04/02/2021    Influenza Vaccine 11/22/2021    Influenza, FLUARIX, FLULAVAL, FLUZONE (age 10 mo+) AND AFLURIA, (age 1 y+), PF, 0.5mL 11/11/2022       Allergies:  No Known Allergies    Current Medications:   Current Outpatient Medications:     metFORMIN (GLUCOPHAGE) 500 mg tablet, Take 1 Tablet by mouth daily (with breakfast).  For diabetes, Disp: 90 Tablet, Rfl: 1    Blood-Glucose Meter monitoring kit, Used to check blood sugars 1 times daily. Please dispense brand preferred by insurance, Disp: 1 Kit, Rfl: 0    glucose blood VI test strips (blood glucose test) strip, Used to check blood sugars 1 times daily. DX E11.9. Please dispense brand preferred by insurance, Disp: 100 Strip, Rfl: 5    lancets misc, Use 1 time per day as directed. Please dispense what insurance will cover, Disp: 1 Each, Rfl: 11    amLODIPine (NORVASC) 5 mg tablet, Take 1 Tablet by mouth daily. Indications: high blood pressure, Disp: 90 Tablet, Rfl: 1    rosuvastatin (CRESTOR) 20 mg tablet, Take 1 Tablet by mouth nightly., Disp: 90 Tablet, Rfl: 1    spironolactone (ALDACTONE) 25 mg tablet, Take 1 Tablet by mouth daily. , Disp: 90 Tablet, Rfl: 1    penicillin v potassium (VEETID) 500 mg tablet, Take 1 Tablet by mouth four (4) times daily for 10 days. , Disp: 40 Tablet, Rfl: 0    ibuprofen (MOTRIN) 600 mg tablet, Take 1 Tablet by mouth every eight (8) hours. , Disp: 15 Tablet, Rfl: 0    Lidocaine Viscous 2 % solution, Take 5 mL by mouth two (2) times daily as needed for Pain. Put 5-10ml onto gauze and put in mouth; hold for 30min. Can repeat up to twice daily. Indications: administration of local anesthetic drug, Disp: 200 mL, Rfl: 0    chlorhexidine (Peridex) 0.12 % solution, 15 mL by Swish and Spit route every twelve (12) hours for 14 days. , Disp: 420 mL, Rfl: 0    Vitamin D2 1,250 mcg (50,000 unit) capsule, Take 50,000 Units by mouth every seven (7) days. , Disp: , Rfl:     bisacodyL (Dulcolax, bisacodyl,) 5 mg EC tablet, Take tablets as directed for bowel prep., Disp: 4 Tablet, Rfl: 0    letrozole (FEMARA) 2.5 mg tablet, Take 2.5 mg by mouth daily. , Disp: , Rfl:     Review of System:  Negative except-see HPI    Vitals:  Visit Vitals  /67   Pulse 92   Temp 97 °F (36.1 °C) (Temporal)   Resp 16   Ht 5' 6\" (1.676 m)   Wt 308 lb 12.8 oz (140.1 kg)   SpO2 98%   BMI 49.84 kg/m²       Physical Examination:  General appearance - alert, well appearing, and in no distress, overweight, and well hydrated  Mental status - alert, oriented to person, place, and time  Eyes - pupils equal and reactive, extraocular eye movements intact  Ears - bilateral TM's and external ear canals normal  Nose - normal and patent, no erythema, discharge or polyps  Mouth - mucous membranes moist, pharynx normal without lesions and dental caries left upper (#15 and #16)  Chest - clear to auscultation, no wheezes, rales or rhonchi, symmetric air entry  Heart - normal rate, regular rhythm, normal S1, S2, no murmurs, rubs, clicks or gallops  Abdomen - soft, nontender, nondistended, no masses or organomegaly  Neurological - alert, oriented, normal speech, no focal findings or movement disorder noted  Musculoskeletal - no joint tenderness, deformity or swelling  Extremities - peripheral pulses normal, no pedal edema, no clubbing or cyanosis  Skin - normal coloration and turgor, no rashes, no suspicious skin lesions noted    Reviewed Data:  A1c 6.2-6.5  TG 93; LDL   Hypokalemia 3.4. Past 3 previous readings have been within normal limits    Assessment:  1. Annual physical exam    2. Essential hypertension  BP well controlled on current therapy    - amLODIPine (NORVASC) 5 mg tablet; Take 1 Tablet by mouth daily. Indications: high blood pressure  Dispense: 90 Tablet; Refill: 1  - spironolactone (ALDACTONE) 25 mg tablet; Take 1 Tablet by mouth daily. Dispense: 90 Tablet; Refill: 1    3. Mixed hyperlipidemia  Not controlled  TG 93; LDL   Currently taking Crestor 10 mg daily  Increase Crestor to 20 mg daily    Reduce fried fatty or oily foods in diet  Limit red meats, processed foods, and alcohol. Limit fast food  Work on weight reduction  Increase water intake    - rosuvastatin (CRESTOR) 20 mg tablet; Take 1 Tablet by mouth nightly. Dispense: 90 Tablet; Refill: 1    4.  Type 2 diabetes mellitus with hyperglycemia, without long-term current use of insulin ([facility])  A1c 6.2-6.5  Not controlled  New diagnosis of type 2 diabetes  Initiate metformin 500 mg daily  Discussed possible side effects  Placed order for glucometer    Instructed patient to cut back on carbs and sugary foods  Increase exercise  Encourage weight loss  Increase water intake    Reviewed current value and goal related to age, discussed dietary changes including to select low sugar and low simple carbohydrates and eating stable protein throughout the day, medications with the correct way to take them and side effects that should be reported such as muscle pain, weakness, near syncope. Discussed consequences of poor management with vascular stress, increased occurrence of neuropathy causing pain and disability, decreased circulation and increased occurrence of infection resulting in extremity amputation and general illness, and increased incidence of death by renal failure, stroke and MI.     Educated patient on the importance of maintaining proper skin/foot care as diabetics can have poor vascular circulation that could lead to ulcers. Educated patient on the importance of following up with a podiatrist and if unable to see a podiatrist then to make sure they cut the nails straight across to prevent any trauma to the skin. Educated patient on the importance of wearing proper fitting shoes, avoid soaking feet in hot water or Epson salt, and never walk barefoot as this exposes the feet to possible trauma. Refer to Pharm. D. No    - metFORMIN (GLUCOPHAGE) 500 mg tablet; Take 1 Tablet by mouth daily (with breakfast). For diabetes  Dispense: 90 Tablet; Refill: 1  - Blood-Glucose Meter monitoring kit; Used to check blood sugars 1 times daily. Please dispense brand preferred by insurance  Dispense: 1 Kit; Refill: 0  - glucose blood VI test strips (blood glucose test) strip; Used to check blood sugars 1 times daily. DX E11.9. Please dispense brand preferred by insurance  Dispense: 100 Strip; Refill: 5  - lancets misc; Use 1 time per day as directed. Please dispense what insurance will cover  Dispense: 1 Each; Refill: 11    5. Hypokalemia  Level 3.4  Stable for the past 3 evaluations  No chg in therapy   Will continue to monitor    6. Tooth infection  Continue current antibiotic therapy  Peridex and lidocaine viscous    Specialist managed condition is being evaluated/managed by dentist. No acute findings today meriting change in management plan. 7. Needs flu shot    - INFLUENZA, FLUARIX, FLULAVAL, FLUZONE (AGE 6 MO+), AFLURIA(AGE 3Y+) IM, PF, 0.5 ML      Plan:  1. Continue current meds as prescribed. Follow up: Follow-up and Dispositions    Return in about 6 months (around 5/11/2023) for med release Dr Suyapa Mcclure need pap results, Lab-fasting. (CMP, A1c, lipid)  2. Colonoscopy due: 7/6/2032  3. Mammogram due: N/A-bilateral mastectomy 3/2021  4. Immunizations due: Flu today. Obtain Tdap at local pharmacy  5. Bone density due: N/A  6. Pap Smear due: Completed 8/2022.   Requested Pap results from Dr. Sarina Reilly, DNP

## 2022-11-11 NOTE — PROGRESS NOTES
Yamileth Cleveland Jessica 1965 female who presents for routine immunizations. Patient denies any symptoms , reactions or allergies that would exclude them from being immunized today. Risks and adverse reactions were discussed and the VIS was given to them. All questions were addressed. Order placed for INFLUENZA in RIGHT deltoid, per Verbal Order from Katia Coe, [address], with read back. Patient was observed for 15 min post injection. There were no reactions observed.     Salma FRANKEL

## 2022-11-11 NOTE — PATIENT INSTRUCTIONS
Vaccine Information Statement    Influenza (Flu) Vaccine (Inactivated or Recombinant): What You Need to Know    Many vaccine information statements are available in Serbian and other languages. See www.immunize.org/vis. Hojas de información sobre vacunas están disponibles en español y en muchos otros idiomas. Visite www.immunize.org/vis. 1. Why get vaccinated? Influenza vaccine can prevent influenza (flu). Flu is a contagious disease that spreads around the [facility] every year, usually between October and May. Anyone can get the flu, but it is more dangerous for some people. Infants and young children, people 72 years and older, pregnant people, and people with certain health conditions or a weakened immune system are at greatest risk of flu complications. Pneumonia, bronchitis, sinus infections, and ear infections are examples of flu-related complications. If you have a medical condition, such as heart disease, cancer, or diabetes, flu can make it worse. Flu can cause fever and chills, sore throat, muscle aches, fatigue, cough, headache, and runny or stuffy nose. Some people may have vomiting and diarrhea, though this is more common in children than adults. In an average year, thousands of people in the [facility] die from flu, and many more are hospitalized. Flu vaccine prevents millions of illnesses and flu-related visits to the doctor each year. 2. Influenza vaccines     CDC recommends everyone 6 months and older get vaccinated every flu season. Children 6 months through 6years of age may need 2 doses during a single flu season. Everyone else needs only 1 dose each flu season. It takes about 2 weeks for protection to develop after vaccination. There are many flu viruses, and they are always changing. Each year a new flu vaccine is made to protect against the influenza viruses believed to be likely to cause disease in the upcoming flu season.  Even when the vaccine doesnt exactly match these viruses, it may still provide some protection. Influenza vaccine does not cause flu. Influenza vaccine may be given at the same time as other vaccines. 3. Talk with your health care provider    Tell your vaccination provider if the person getting the vaccine:  Has had an allergic reaction after a previous dose of influenza vaccine, or has any severe, life-threatening allergies   Has ever had Guillain-Barré Syndrome (also called GBS)    In some cases, your health care provider may decide to postpone influenza vaccination until a future visit. Influenza vaccine can be administered at any time during pregnancy. People who are or will be pregnant during influenza season should receive inactivated influenza vaccine. People with minor illnesses, such as a cold, may be vaccinated. People who are moderately or severely ill should usually wait until they recover before getting influenza vaccine. Your health care provider can give you more information. 4. Risks of a vaccine reaction    Soreness, redness, and swelling where the shot is given, fever, muscle aches, and headache can happen after influenza vaccination. There may be a very small increased risk of Guillain-Barré Syndrome (GBS) after inactivated influenza vaccine (the flu shot). [place] children who get the flu shot along with pneumococcal vaccine (PCV13) and/or DTaP vaccine at the same time might be slightly more likely to have a seizure caused by fever. Tell your health care provider if a child who is getting flu vaccine has ever had a seizure. People sometimes faint after medical procedures, including vaccination. Tell your provider if you feel dizzy or have vision changes or ringing in the ears. As with any medicine, there is a very remote chance of a vaccine causing a severe allergic reaction, other serious injury, or death. 5. What if there is a serious problem?     An allergic reaction could occur after the vaccinated person leaves the clinic. If you see signs of a severe allergic reaction (hives, swelling of the face and throat, difficulty breathing, a fast heartbeat, dizziness, or weakness), call 9-1-1 and get the person to the nearest hospital.    For other signs that concern you, call your health care provider. Adverse reactions should be reported to the Vaccine Adverse Event Reporting System (VAERS). Your health care provider will usually file this report, or you can do it yourself. Visit the VAERS website at www.vaers. [facility].gov or call 9-263.607.8496. VAERS is only for reporting reactions, and VAERS staff members do not give medical advice. 6. The National Vaccine Injury Compensation Program    The [facility] Vaccine Injury Compensation Program (VICP) is a federal program that was created to compensate people who may have been injured by certain vaccines. Claims regarding alleged injury or death due to vaccination have a time limit for filing, which may be as short as two years. Visit the VICP website at www.[place].gov/vaccinecompensation or call 4-714.526.8020 to learn about the program and about filing a claim. 7. How can I learn more? Ask your health care provider. Call your local or state health department. Visit the website of the Food and Drug Administration (FDA) for vaccine package inserts and additional information at www.fda.gov/vaccines-blood-biologics/vaccines. Contact the Centers for Disease Control and Prevention (CDC): Call 3-913.407.5265 (1-800-CDC-INFO) or  Visit CDCs influenza website at www.cdc.gov/flu. Vaccine Information Statement   Inactivated Influenza Vaccine   8/6/2021  42 FRANCA Johns [id number]   Department of Health and Human Services  Centers for Disease Control and Prevention    Office Use Only

## 2023-02-09 ENCOUNTER — HOSPITAL ENCOUNTER (OUTPATIENT)
Dept: RADIATION THERAPY | Age: 58
Discharge: HOME | End: 2023-02-09
Payer: MEDICAID

## 2023-02-09 PROCEDURE — 99212 OFFICE O/P EST SF 10 MIN: CPT | Performed by: RADIOLOGY

## 2023-02-09 PROCEDURE — 99213 OFFICE O/P EST LOW 20 MIN: CPT

## 2023-04-04 ENCOUNTER — HOSPITAL ENCOUNTER (OUTPATIENT)
Facility: HOSPITAL | Age: 58
Discharge: HOME | End: 2023-04-07
Payer: MEDICARE

## 2023-04-04 DIAGNOSIS — Z78.0 MENOPAUSE: ICD-10-CM

## 2023-04-04 DIAGNOSIS — C50.811 MALIGNANT NEOPLASM OF OVERLAPPING SITES OF RIGHT FEMALE BREAST, UNSPECIFIED ESTROGEN RECEPTOR STATUS (HCC): ICD-10-CM

## 2023-04-04 LAB — CREAT UR-MCNC: 0.8 MG/DL (ref 0.6–1.3)

## 2023-04-04 PROCEDURE — 71260 CT THORAX DX C+: CPT

## 2023-04-04 PROCEDURE — 82565 ASSAY OF CREATININE: CPT

## 2023-04-04 PROCEDURE — 77080 DXA BONE DENSITY AXIAL: CPT

## 2023-04-04 PROCEDURE — 6360000004 HC RX CONTRAST MEDICATION: Performed by: INTERNAL MEDICINE

## 2023-04-04 RX ADMIN — IOPAMIDOL 100 ML: 612 INJECTION, SOLUTION INTRAVENOUS at 11:29

## 2023-04-28 ENCOUNTER — HOSPITAL ENCOUNTER (OUTPATIENT)
Facility: HOSPITAL | Age: 58
Setting detail: SPECIMEN
End: 2023-04-28
Payer: MEDICARE

## 2023-04-28 DIAGNOSIS — I10 ESSENTIAL HYPERTENSION: ICD-10-CM

## 2023-04-28 DIAGNOSIS — I10 ESSENTIAL HYPERTENSION: Primary | ICD-10-CM

## 2023-04-28 DIAGNOSIS — R73.03 PREDIABETES: ICD-10-CM

## 2023-04-28 DIAGNOSIS — E78.2 MIXED HYPERLIPIDEMIA: ICD-10-CM

## 2023-04-28 LAB
ALBUMIN SERPL-MCNC: 3.9 G/DL (ref 3.4–5)
ALBUMIN/GLOB SERPL: 1.1 (ref 0.8–1.7)
ALP SERPL-CCNC: 81 U/L (ref 45–117)
ALT SERPL-CCNC: 39 U/L (ref 13–56)
ANION GAP SERPL CALC-SCNC: 6 MMOL/L (ref 3–18)
AST SERPL-CCNC: 25 U/L (ref 10–38)
BILIRUB SERPL-MCNC: 0.6 MG/DL (ref 0.2–1)
BUN SERPL-MCNC: 17 MG/DL (ref 7–18)
BUN/CREAT SERPL: 16 (ref 12–20)
CALCIUM SERPL-MCNC: 9.8 MG/DL (ref 8.5–10.1)
CHLORIDE SERPL-SCNC: 108 MMOL/L (ref 100–111)
CHOLEST SERPL-MCNC: 137 MG/DL
CO2 SERPL-SCNC: 27 MMOL/L (ref 21–32)
CREAT SERPL-MCNC: 1.04 MG/DL (ref 0.6–1.3)
EST. AVERAGE GLUCOSE BLD GHB EST-MCNC: 143 MG/DL
GLOBULIN SER CALC-MCNC: 3.7 G/DL (ref 2–4)
GLUCOSE SERPL-MCNC: 133 MG/DL (ref 74–99)
HBA1C MFR BLD: 6.6 % (ref 4.2–5.6)
HDLC SERPL-MCNC: 37 MG/DL (ref 40–60)
HDLC SERPL: 3.7 (ref 0–5)
LDLC SERPL CALC-MCNC: 53.4 MG/DL (ref 0–100)
LIPID PANEL: ABNORMAL
POTASSIUM SERPL-SCNC: 4.2 MMOL/L (ref 3.5–5.5)
PROT SERPL-MCNC: 7.6 G/DL (ref 6.4–8.2)
SODIUM SERPL-SCNC: 141 MMOL/L (ref 136–145)
TRIGL SERPL-MCNC: 233 MG/DL
VLDLC SERPL CALC-MCNC: 46.6 MG/DL

## 2023-04-28 PROCEDURE — 80053 COMPREHEN METABOLIC PANEL: CPT

## 2023-04-28 PROCEDURE — 83036 HEMOGLOBIN GLYCOSYLATED A1C: CPT

## 2023-04-28 PROCEDURE — 80061 LIPID PANEL: CPT

## 2023-04-28 PROCEDURE — 36415 COLL VENOUS BLD VENIPUNCTURE: CPT

## 2023-04-28 NOTE — PROGRESS NOTES
Kelsea Hernández 1965 female who presents for lab orders following office visit. Order placed for (CMP, A1c, lipid),  per Verbal Order from Allen Mendez, with read back. There were no reactions observed.     NICHOLAS Lyon

## 2023-05-05 ENCOUNTER — OFFICE VISIT (OUTPATIENT)
Age: 58
End: 2023-05-05
Payer: MEDICARE

## 2023-05-05 VITALS
TEMPERATURE: 96.8 F | WEIGHT: 293 LBS | BODY MASS INDEX: 47.09 KG/M2 | DIASTOLIC BLOOD PRESSURE: 83 MMHG | OXYGEN SATURATION: 95 % | HEART RATE: 98 BPM | HEIGHT: 66 IN | RESPIRATION RATE: 18 BRPM | SYSTOLIC BLOOD PRESSURE: 126 MMHG

## 2023-05-05 DIAGNOSIS — I10 ESSENTIAL HYPERTENSION: Primary | ICD-10-CM

## 2023-05-05 DIAGNOSIS — E78.2 MIXED HYPERLIPIDEMIA: ICD-10-CM

## 2023-05-05 DIAGNOSIS — E11.65 TYPE 2 DIABETES MELLITUS WITH HYPERGLYCEMIA, WITHOUT LONG-TERM CURRENT USE OF INSULIN (HCC): ICD-10-CM

## 2023-05-05 DIAGNOSIS — E66.01 CLASS 3 SEVERE OBESITY DUE TO EXCESS CALORIES WITH SERIOUS COMORBIDITY AND BODY MASS INDEX (BMI) OF 50.0 TO 59.9 IN ADULT (HCC): ICD-10-CM

## 2023-05-05 PROBLEM — E66.813 CLASS 3 SEVERE OBESITY DUE TO EXCESS CALORIES WITH SERIOUS COMORBIDITY AND BODY MASS INDEX (BMI) OF 50.0 TO 59.9 IN ADULT: Status: ACTIVE | Noted: 2023-05-05

## 2023-05-05 PROBLEM — E66.9 OBESE BODY HABITUS: Status: RESOLVED | Noted: 2021-05-24 | Resolved: 2023-05-05

## 2023-05-05 PROCEDURE — 3044F HG A1C LEVEL LT 7.0%: CPT | Performed by: NURSE PRACTITIONER

## 2023-05-05 PROCEDURE — 3074F SYST BP LT 130 MM HG: CPT | Performed by: NURSE PRACTITIONER

## 2023-05-05 PROCEDURE — 3079F DIAST BP 80-89 MM HG: CPT | Performed by: NURSE PRACTITIONER

## 2023-05-05 PROCEDURE — 99213 OFFICE O/P EST LOW 20 MIN: CPT | Performed by: NURSE PRACTITIONER

## 2023-05-05 RX ORDER — ROSUVASTATIN CALCIUM 20 MG/1
20 TABLET, COATED ORAL DAILY
Qty: 90 TABLET | Refills: 1 | Status: SHIPPED | OUTPATIENT
Start: 2023-05-05

## 2023-05-05 RX ORDER — AMLODIPINE BESYLATE 5 MG/1
5 TABLET ORAL DAILY
Qty: 90 TABLET | Refills: 1 | Status: SHIPPED | OUTPATIENT
Start: 2023-05-05

## 2023-05-05 SDOH — ECONOMIC STABILITY: FOOD INSECURITY: WITHIN THE PAST 12 MONTHS, THE FOOD YOU BOUGHT JUST DIDN'T LAST AND YOU DIDN'T HAVE MONEY TO GET MORE.: NEVER TRUE

## 2023-05-05 SDOH — ECONOMIC STABILITY: HOUSING INSECURITY
IN THE LAST 12 MONTHS, WAS THERE A TIME WHEN YOU DID NOT HAVE A STEADY PLACE TO SLEEP OR SLEPT IN A SHELTER (INCLUDING NOW)?: NO

## 2023-05-05 SDOH — ECONOMIC STABILITY: FOOD INSECURITY: WITHIN THE PAST 12 MONTHS, YOU WORRIED THAT YOUR FOOD WOULD RUN OUT BEFORE YOU GOT MONEY TO BUY MORE.: NEVER TRUE

## 2023-05-05 SDOH — ECONOMIC STABILITY: INCOME INSECURITY: HOW HARD IS IT FOR YOU TO PAY FOR THE VERY BASICS LIKE FOOD, HOUSING, MEDICAL CARE, AND HEATING?: NOT HARD AT ALL

## 2023-05-05 ASSESSMENT — PATIENT HEALTH QUESTIONNAIRE - PHQ9
SUM OF ALL RESPONSES TO PHQ QUESTIONS 1-9: 1
3. TROUBLE FALLING OR STAYING ASLEEP: 0
SUM OF ALL RESPONSES TO PHQ9 QUESTIONS 1 & 2: 1
9. THOUGHTS THAT YOU WOULD BE BETTER OFF DEAD, OR OF HURTING YOURSELF: 0
6. FEELING BAD ABOUT YOURSELF - OR THAT YOU ARE A FAILURE OR HAVE LET YOURSELF OR YOUR FAMILY DOWN: 0
SUM OF ALL RESPONSES TO PHQ QUESTIONS 1-9: 1
2. FEELING DOWN, DEPRESSED OR HOPELESS: 1
7. TROUBLE CONCENTRATING ON THINGS, SUCH AS READING THE NEWSPAPER OR WATCHING TELEVISION: 0
10. IF YOU CHECKED OFF ANY PROBLEMS, HOW DIFFICULT HAVE THESE PROBLEMS MADE IT FOR YOU TO DO YOUR WORK, TAKE CARE OF THINGS AT HOME, OR GET ALONG WITH OTHER PEOPLE: 0
8. MOVING OR SPEAKING SO SLOWLY THAT OTHER PEOPLE COULD HAVE NOTICED. OR THE OPPOSITE, BEING SO FIGETY OR RESTLESS THAT YOU HAVE BEEN MOVING AROUND A LOT MORE THAN USUAL: 0
SUM OF ALL RESPONSES TO PHQ QUESTIONS 1-9: 1
1. LITTLE INTEREST OR PLEASURE IN DOING THINGS: 0
5. POOR APPETITE OR OVEREATING: 0
4. FEELING TIRED OR HAVING LITTLE ENERGY: 0
SUM OF ALL RESPONSES TO PHQ QUESTIONS 1-9: 1

## 2023-05-05 NOTE — ASSESSMENT & PLAN NOTE
Well-controlled, continue current medications   A1c 6.5-6.6  Discussed increasing A1c and the importance of weight loss and proper diet

## 2023-05-05 NOTE — ASSESSMENT & PLAN NOTE
Uncontrolled, continue current medications and lifestyle modifications recommended   TG ; -53  We will hold off with adjusting statin therapy for now. Patient to work on diet and weight loss. Recheck level 6 months    Reduce fried fatty or oily foods in diet  Limit red meats, processed foods, and alcohol. Limit fast food  Increase physical activity to 60 minutes of moderate intensity aerobic exercise per week. Increase water intake  Reduce alcohol intake    How to raise HDL if low:  Consume oats, beans, legumes, olive oil, whole grains, nuts, seeds, fatty fish, and berries.   Lose weight/fat  Reduce alcohol consumption  Smoking cessation

## 2023-05-05 NOTE — ASSESSMENT & PLAN NOTE
Uncontrolled, lifestyle modifications recommended   29 pound weight gain in 1 year  BMI is out of normal parameters and plan is as follows: Discussed in great detail on diet, portion control, exercise, avoiding foods high in sugar, carbs and starches, fatty/greasy foods and to eat until satisfied not til full. I have counseled this patient on diet and exercise regimens as well.

## 2023-05-05 NOTE — PROGRESS NOTES
[facility] [address]  [place], [address]  [phone] [id number]/[phone] fax    5/5/2023    Cookie Andrade 1965 is a pleasant Black /  female. Todays concern/HPI:  Cholesterol. Not eating right. Lots of fried foods. DM. Taking metformin. Weight gain. Not active, eating poorly. HTN. Tolerating meds. Denies chest pain, fatigue, shortness of breath, dizziness. Review of Systems - Negative except above    Past Medical History:   Diagnosis Date    Ankle swelling 05/24/2021    Arthritis     Cancer ([facility] [address]     Essential hypertension 05/24/2021    High cholesterol     Hypertension     Malignant neoplasm of right female breast ([facility] [address] 10/20/2020    Dr Angelia Phan (including lymph nodes) chemo/radiation. T4dN2,ER/DE+,HER2-    Mixed hyperlipidemia 05/24/2021    Obese body habitus 05/24/2021    Sleep apnea     CPAP    Stress 05/24/2021     Current Outpatient Medications   Medication Sig    rosuvastatin (CRESTOR) 20 MG tablet Take 1 tablet by mouth daily    metFORMIN (GLUCOPHAGE) 500 MG tablet Take 1 tablet by mouth daily (with breakfast)    amLODIPine (NORVASC) 5 MG tablet Take 1 tablet by mouth daily    Lancets MISC Use 1 time per day as directed. Please dispense what insurance will cover    bisacodyl (DULCOLAX) 5 MG EC tablet Take tablets as directed for bowel prep.    letrozole (FEMARA) 2.5 MG tablet Take 1 tablet by mouth daily     No current facility-administered medications for this visit. Physical:   /83 Comment: manual  Pulse 98   Temp 96.8 °F (36 °C) (Temporal)   Resp 18   Ht 5' 6\" (1.676 m)   Wt (!) 316 lb (143.3 kg)   SpO2 95%   BMI 51.00 kg/m²     Exam:   Physical Exam  Constitutional:       Appearance: Normal appearance. She is obese. Eyes:      Extraocular Movements: Extraocular movements intact. Conjunctiva/sclera: Conjunctivae normal.   Neck:      Vascular: No carotid bruit.    Cardiovascular:      Rate and

## 2023-08-09 ENCOUNTER — HOSPITAL ENCOUNTER (OUTPATIENT)
Facility: HOSPITAL | Age: 58
Setting detail: RECURRING SERIES
Discharge: HOME | End: 2023-08-12
Payer: MEDICARE

## 2023-08-09 PROCEDURE — 99212 OFFICE O/P EST SF 10 MIN: CPT | Performed by: RADIOLOGY

## 2023-08-09 PROCEDURE — 99213 OFFICE O/P EST LOW 20 MIN: CPT

## 2023-09-25 DIAGNOSIS — I10 ESSENTIAL (PRIMARY) HYPERTENSION: ICD-10-CM

## 2023-09-26 RX ORDER — SPIRONOLACTONE 25 MG/1
25 TABLET ORAL DAILY
Qty: 90 TABLET | Refills: 1 | OUTPATIENT
Start: 2023-09-26

## 2023-11-01 ENCOUNTER — NURSE ONLY (OUTPATIENT)
Age: 58
End: 2023-11-01

## 2023-11-01 ENCOUNTER — HOSPITAL ENCOUNTER (OUTPATIENT)
Facility: HOSPITAL | Age: 58
Setting detail: SPECIMEN
Discharge: HOME | End: 2023-11-04
Payer: MEDICARE

## 2023-11-01 DIAGNOSIS — I10 ESSENTIAL HYPERTENSION: ICD-10-CM

## 2023-11-01 DIAGNOSIS — E11.65 TYPE 2 DIABETES MELLITUS WITH HYPERGLYCEMIA, UNSPECIFIED WHETHER LONG TERM INSULIN USE (HCC): ICD-10-CM

## 2023-11-01 DIAGNOSIS — E11.65 TYPE 2 DIABETES MELLITUS WITH HYPERGLYCEMIA, WITHOUT LONG-TERM CURRENT USE OF INSULIN (HCC): ICD-10-CM

## 2023-11-01 DIAGNOSIS — E78.2 MIXED HYPERLIPIDEMIA: ICD-10-CM

## 2023-11-01 DIAGNOSIS — E78.2 MIXED HYPERLIPIDEMIA: Primary | ICD-10-CM

## 2023-11-01 LAB
ALBUMIN SERPL-MCNC: 3.6 G/DL (ref 3.4–5)
ALBUMIN/GLOB SERPL: 0.9 (ref 0.8–1.7)
ALP SERPL-CCNC: 77 U/L (ref 45–117)
ALT SERPL-CCNC: 30 U/L (ref 13–56)
ANION GAP SERPL CALC-SCNC: 3 MMOL/L (ref 3–18)
AST SERPL-CCNC: 22 U/L (ref 10–38)
BILIRUB SERPL-MCNC: 0.7 MG/DL (ref 0.2–1)
BUN SERPL-MCNC: 20 MG/DL (ref 7–18)
BUN/CREAT SERPL: 23 (ref 12–20)
CALCIUM SERPL-MCNC: 9.4 MG/DL (ref 8.5–10.1)
CHLORIDE SERPL-SCNC: 107 MMOL/L (ref 100–111)
CHOLEST SERPL-MCNC: 135 MG/DL
CO2 SERPL-SCNC: 30 MMOL/L (ref 21–32)
CREAT SERPL-MCNC: 0.87 MG/DL (ref 0.6–1.3)
EST. AVERAGE GLUCOSE BLD GHB EST-MCNC: 128 MG/DL
GLOBULIN SER CALC-MCNC: 3.8 G/DL (ref 2–4)
GLUCOSE SERPL-MCNC: 104 MG/DL (ref 74–99)
HBA1C MFR BLD: 6.1 % (ref 4.2–5.6)
HDLC SERPL-MCNC: 34 MG/DL (ref 40–60)
HDLC SERPL: 4 (ref 0–5)
LDLC SERPL CALC-MCNC: 84 MG/DL (ref 0–100)
LIPID PANEL: ABNORMAL
POTASSIUM SERPL-SCNC: 4.1 MMOL/L (ref 3.5–5.5)
PROT SERPL-MCNC: 7.4 G/DL (ref 6.4–8.2)
SODIUM SERPL-SCNC: 140 MMOL/L (ref 136–145)
TRIGL SERPL-MCNC: 85 MG/DL
VLDLC SERPL CALC-MCNC: 17 MG/DL

## 2023-11-01 PROCEDURE — 80053 COMPREHEN METABOLIC PANEL: CPT

## 2023-11-01 PROCEDURE — 80061 LIPID PANEL: CPT

## 2023-11-01 PROCEDURE — 36415 COLL VENOUS BLD VENIPUNCTURE: CPT

## 2023-11-01 PROCEDURE — 83036 HEMOGLOBIN GLYCOSYLATED A1C: CPT

## 2023-11-08 ENCOUNTER — OFFICE VISIT (OUTPATIENT)
Age: 58
End: 2023-11-08
Payer: MEDICARE

## 2023-11-08 DIAGNOSIS — C50.911 MALIGNANT NEOPLASM OF RIGHT FEMALE BREAST, UNSPECIFIED ESTROGEN RECEPTOR STATUS, UNSPECIFIED SITE OF BREAST (HCC): ICD-10-CM

## 2023-11-08 DIAGNOSIS — I10 ESSENTIAL HYPERTENSION: ICD-10-CM

## 2023-11-08 DIAGNOSIS — Z00.00 WELCOME TO MEDICARE PREVENTIVE VISIT: Primary | ICD-10-CM

## 2023-11-08 DIAGNOSIS — E78.2 MIXED HYPERLIPIDEMIA: ICD-10-CM

## 2023-11-08 DIAGNOSIS — Z23 NEED FOR PROPHYLACTIC VACCINATION AGAINST DIPHTHERIA-TETANUS-PERTUSSIS (DTP): ICD-10-CM

## 2023-11-08 DIAGNOSIS — Z00.00 MEDICARE WELCOME EXAM: ICD-10-CM

## 2023-11-08 DIAGNOSIS — E66.01 CLASS 3 SEVERE OBESITY DUE TO EXCESS CALORIES WITH SERIOUS COMORBIDITY AND BODY MASS INDEX (BMI) OF 45.0 TO 49.9 IN ADULT (HCC): ICD-10-CM

## 2023-11-08 DIAGNOSIS — E11.65 TYPE 2 DIABETES MELLITUS WITH HYPERGLYCEMIA, WITHOUT LONG-TERM CURRENT USE OF INSULIN (HCC): ICD-10-CM

## 2023-11-08 DIAGNOSIS — Z23 NEED FOR PROPHYLACTIC VACCINATION AND INOCULATION AGAINST VARICELLA: ICD-10-CM

## 2023-11-08 DIAGNOSIS — Z23 NEED FOR PROPHYLACTIC VACCINATION AGAINST STREPTOCOCCUS PNEUMONIAE (PNEUMOCOCCUS): ICD-10-CM

## 2023-11-08 PROCEDURE — 90670 PCV13 VACCINE IM: CPT | Performed by: NURSE PRACTITIONER

## 2023-11-08 PROCEDURE — G0008 ADMIN INFLUENZA VIRUS VAC: HCPCS | Performed by: NURSE PRACTITIONER

## 2023-11-08 PROCEDURE — 93005 ELECTROCARDIOGRAM TRACING: CPT | Performed by: NURSE PRACTITIONER

## 2023-11-08 RX ORDER — ROSUVASTATIN CALCIUM 20 MG/1
20 TABLET, COATED ORAL DAILY
Qty: 93 TABLET | Refills: 3 | Status: SHIPPED | OUTPATIENT
Start: 2023-11-08

## 2023-11-08 RX ORDER — AMLODIPINE BESYLATE 5 MG/1
5 TABLET ORAL DAILY
Qty: 93 TABLET | Refills: 1 | Status: SHIPPED | OUTPATIENT
Start: 2023-11-08

## 2023-11-08 ASSESSMENT — PATIENT HEALTH QUESTIONNAIRE - PHQ9
SUM OF ALL RESPONSES TO PHQ QUESTIONS 1-9: 0
2. FEELING DOWN, DEPRESSED OR HOPELESS: 0
SUM OF ALL RESPONSES TO PHQ QUESTIONS 1-9: 0
SUM OF ALL RESPONSES TO PHQ9 QUESTIONS 1 & 2: 0
SUM OF ALL RESPONSES TO PHQ QUESTIONS 1-9: 0
SUM OF ALL RESPONSES TO PHQ QUESTIONS 1-9: 0
SUM OF ALL RESPONSES TO PHQ9 QUESTIONS 1 & 2: 0
2. FEELING DOWN, DEPRESSED OR HOPELESS: 0
SUM OF ALL RESPONSES TO PHQ QUESTIONS 1-9: 0
1. LITTLE INTEREST OR PLEASURE IN DOING THINGS: 0
1. LITTLE INTEREST OR PLEASURE IN DOING THINGS: 0
SUM OF ALL RESPONSES TO PHQ QUESTIONS 1-9: 0

## 2023-11-08 ASSESSMENT — LIFESTYLE VARIABLES
HOW OFTEN DO YOU HAVE A DRINK CONTAINING ALCOHOL: NEVER
HOW MANY STANDARD DRINKS CONTAINING ALCOHOL DO YOU HAVE ON A TYPICAL DAY: PATIENT DOES NOT DRINK

## 2023-11-09 VITALS
WEIGHT: 293 LBS | DIASTOLIC BLOOD PRESSURE: 64 MMHG | RESPIRATION RATE: 18 BRPM | BODY MASS INDEX: 47.09 KG/M2 | SYSTOLIC BLOOD PRESSURE: 120 MMHG | OXYGEN SATURATION: 96 % | HEIGHT: 66 IN | HEART RATE: 94 BPM | TEMPERATURE: 97 F

## 2023-11-09 PROBLEM — M25.473 ANKLE SWELLING: Status: RESOLVED | Noted: 2021-05-24 | Resolved: 2023-11-09

## 2023-11-09 PROBLEM — E78.2 MIXED HYPERLIPIDEMIA: Status: ACTIVE | Noted: 2021-05-24

## 2023-11-09 PROBLEM — E87.6 HYPOKALEMIA: Status: RESOLVED | Noted: 2021-05-25 | Resolved: 2023-11-09

## 2023-11-09 PROBLEM — Z00.00 WELCOME TO MEDICARE PREVENTIVE VISIT: Status: ACTIVE | Noted: 2023-11-09

## 2023-11-09 PROBLEM — C50.911 MALIGNANT NEOPLASM OF RIGHT FEMALE BREAST (HCC): Status: ACTIVE | Noted: 2021-03-31

## 2023-11-09 PROBLEM — E66.01 CLASS 3 SEVERE OBESITY DUE TO EXCESS CALORIES WITH SERIOUS COMORBIDITY AND BODY MASS INDEX (BMI) OF 50.0 TO 59.9 IN ADULT (HCC): Status: RESOLVED | Noted: 2023-05-05 | Resolved: 2023-11-09

## 2023-11-09 PROBLEM — E11.65 TYPE 2 DIABETES MELLITUS WITH HYPERGLYCEMIA, WITHOUT LONG-TERM CURRENT USE OF INSULIN (HCC): Status: ACTIVE | Noted: 2022-11-11

## 2023-11-09 PROBLEM — R73.03 PREDIABETES: Status: RESOLVED | Noted: 2021-05-25 | Resolved: 2023-11-09

## 2023-11-09 PROBLEM — F43.9 STRESS: Status: RESOLVED | Noted: 2021-05-24 | Resolved: 2023-11-09

## 2023-11-09 PROBLEM — E66.813 CLASS 3 SEVERE OBESITY DUE TO EXCESS CALORIES WITH SERIOUS COMORBIDITY AND BODY MASS INDEX (BMI) OF 50.0 TO 59.9 IN ADULT: Status: RESOLVED | Noted: 2023-05-05 | Resolved: 2023-11-09

## 2023-11-09 NOTE — ASSESSMENT & PLAN NOTE
Well-controlled, continue current medications   -85; LDL 84  Continue Crestor 20 mg daily  Recheck level 12 months    Reduce fried fatty or oily foods in diet  Limit red meats, processed foods, and alcohol. Limit fast food  Increase physical activity to 60 minutes of moderate intensity aerobic exercise per week. Increase water intake  Reduce alcohol intake    How to raise HDL if low:  Consume oats, beans, legumes, olive oil, whole grains, nuts, seeds, fatty fish, and berries.   Lose weight/fat  Reduce alcohol consumption  Smoking cessation

## 2023-11-09 NOTE — ASSESSMENT & PLAN NOTE
Down 12lbs    Obesity Counseling: Patient was asked about her current diet and exercise habits, and personalized advice was provided regarding recommended lifestyle changes. Patient's comorbid health conditions associated with elevated BMI were discussed, as well as the likely benefits of weight loss. Based upon patient's motivation to change her behavior, the following plan was agreed upon to work toward a weight loss goal of 5 pounds: lower carbohydrate diet. Educational materials for weight loss were provided. Patient will follow-up in 12 month(s) with PCP. Discussed Silver Sneakers program. Provider spent 8 minutes counseling patient.

## 2023-11-09 NOTE — ASSESSMENT & PLAN NOTE
Well-controlled, continue current medications   Continue amlodipine 5 mg daily    Limit sodium in diet to 2g/d  Initiate cardio exercise  Weight reduction  Increase water intake  Check BP and report if 3 consecutive readings greater than 139/89 to office

## 2023-11-09 NOTE — ASSESSMENT & PLAN NOTE
[facility]: Reviewed all HM and ordered tests/imaging appropriately. Reviewed care teams and medications with updates. Lab Reviewed with Patient:  CMP okay  -85; LDL 84  A1c 6.6-6.1    Health Maintenance:  Colonoscopy:  7/2032  Mammogram: n/a. had bilateral mastectomy. Immunizations: PNA 15 and flu  Bone density: n/a  Pap Smear: 8/2027  Eye exam: 9/26/24  LDCT: n/a  WTM EKG revealed atrial flutter.  Dr Alvin Connors reviewed EKG and stated this was artifact and EKG is normal.     Specialists:  Dr Simon Mcclure

## 2023-11-09 NOTE — ASSESSMENT & PLAN NOTE
Right breast cancer: DX Sept 2020. Underwent bilateral breast surgery (mastectomy) with Dr Lesvia Sylvester. She was noted to have + lymph nodes to both breasts. She completed chemo back in June 2020. She continues Femara treatment under Dr. Liliana Arreguin.

## 2023-11-09 NOTE — ASSESSMENT & PLAN NOTE
Down 12lbs in 6m  BMI is out of normal parameters and plan is as follows: Discussed in great detail on diet, portion control, exercise, avoiding foods high in sugar, carbs and starches, fatty/greasy foods and to eat until satisfied not til full. I have counseled this patient on diet and exercise regimens as well.

## 2023-11-09 NOTE — ASSESSMENT & PLAN NOTE
Well-controlled, continue current medications   A1c 6.6-6.1  Cont metformin therapy  Reduce carbs and sweets in diet  Be more active to lose weight

## 2023-12-09 PROBLEM — Z00.00 WELCOME TO MEDICARE PREVENTIVE VISIT: Status: RESOLVED | Noted: 2023-11-09 | Resolved: 2023-12-09

## 2024-01-11 ENCOUNTER — HOSPITAL ENCOUNTER (EMERGENCY)
Facility: HOSPITAL | Age: 59
Discharge: HOME | End: 2024-01-11
Attending: EMERGENCY MEDICINE
Payer: MEDICARE

## 2024-01-11 ENCOUNTER — APPOINTMENT (OUTPATIENT)
Facility: HOSPITAL | Age: 59
End: 2024-01-11
Payer: MEDICARE

## 2024-01-11 VITALS
RESPIRATION RATE: 18 BRPM | WEIGHT: 293 LBS | DIASTOLIC BLOOD PRESSURE: 66 MMHG | TEMPERATURE: 98.2 F | BODY MASS INDEX: 47.09 KG/M2 | OXYGEN SATURATION: 98 % | HEART RATE: 95 BPM | HEIGHT: 66 IN | SYSTOLIC BLOOD PRESSURE: 120 MMHG

## 2024-01-11 DIAGNOSIS — N39.0 ACUTE UTI: ICD-10-CM

## 2024-01-11 DIAGNOSIS — R14.0 ABDOMINAL DISTENSION: Primary | ICD-10-CM

## 2024-01-11 LAB
ALBUMIN SERPL-MCNC: 3.5 G/DL (ref 3.4–5)
ALBUMIN/GLOB SERPL: 0.8 (ref 0.8–1.7)
ALP SERPL-CCNC: 68 U/L (ref 45–117)
ALT SERPL-CCNC: 36 U/L (ref 13–56)
ANION GAP SERPL CALC-SCNC: 4 MMOL/L (ref 3–18)
APPEARANCE UR: CLEAR
AST SERPL-CCNC: 30 U/L (ref 10–38)
BACTERIA URNS QL MICRO: NEGATIVE /HPF
BASOPHILS # BLD: 0 K/UL (ref 0–0.1)
BASOPHILS NFR BLD: 0 % (ref 0–2)
BILIRUB SERPL-MCNC: 0.8 MG/DL (ref 0.2–1)
BILIRUB UR QL: NEGATIVE
BUN SERPL-MCNC: 20 MG/DL (ref 7–18)
BUN/CREAT SERPL: 20 (ref 12–20)
CALCIUM SERPL-MCNC: 9.3 MG/DL (ref 8.5–10.1)
CHLORIDE SERPL-SCNC: 111 MMOL/L (ref 100–111)
CO2 SERPL-SCNC: 28 MMOL/L (ref 21–32)
COLOR UR: YELLOW
CREAT SERPL-MCNC: 1 MG/DL (ref 0.6–1.3)
DIFFERENTIAL METHOD BLD: ABNORMAL
EOSINOPHIL # BLD: 0.1 K/UL (ref 0–0.4)
EOSINOPHIL NFR BLD: 1 % (ref 0–5)
EPITH CASTS URNS QL MICRO: NORMAL /LPF (ref 0–5)
ERYTHROCYTE [DISTWIDTH] IN BLOOD BY AUTOMATED COUNT: 13.3 % (ref 11.6–14.5)
GLOBULIN SER CALC-MCNC: 4.5 G/DL (ref 2–4)
GLUCOSE SERPL-MCNC: 92 MG/DL (ref 74–99)
GLUCOSE UR STRIP.AUTO-MCNC: NEGATIVE MG/DL
HCT VFR BLD AUTO: 37.9 % (ref 35–45)
HGB BLD-MCNC: 12 G/DL (ref 12–16)
HGB UR QL STRIP: NEGATIVE
IMM GRANULOCYTES # BLD AUTO: 0 K/UL (ref 0–0.04)
IMM GRANULOCYTES NFR BLD AUTO: 0 % (ref 0–0.5)
KETONES UR QL STRIP.AUTO: ABNORMAL MG/DL
LEUKOCYTE ESTERASE UR QL STRIP.AUTO: ABNORMAL
LIPASE SERPL-CCNC: 38 U/L (ref 13–75)
LYMPHOCYTES # BLD: 1.6 K/UL (ref 0.9–3.6)
LYMPHOCYTES NFR BLD: 23 % (ref 21–52)
MCH RBC QN AUTO: 29.4 PG (ref 24–34)
MCHC RBC AUTO-ENTMCNC: 31.7 G/DL (ref 31–37)
MCV RBC AUTO: 92.9 FL (ref 78–100)
MONOCYTES # BLD: 0.4 K/UL (ref 0.05–1.2)
MONOCYTES NFR BLD: 6 % (ref 3–10)
NEUTS SEG # BLD: 4.7 K/UL (ref 1.8–8)
NEUTS SEG NFR BLD: 69 % (ref 40–73)
NITRITE UR QL STRIP.AUTO: NEGATIVE
NRBC # BLD: 0 K/UL (ref 0–0.01)
NRBC BLD-RTO: 0 PER 100 WBC
PH UR STRIP: 5 (ref 5–8)
PLATELET # BLD AUTO: 271 K/UL (ref 135–420)
PMV BLD AUTO: 9.1 FL (ref 9.2–11.8)
POTASSIUM SERPL-SCNC: 3.6 MMOL/L (ref 3.5–5.5)
PROT SERPL-MCNC: 8 G/DL (ref 6.4–8.2)
PROT UR STRIP-MCNC: NEGATIVE MG/DL
RBC # BLD AUTO: 4.08 M/UL (ref 4.2–5.3)
RBC #/AREA URNS HPF: NEGATIVE /HPF (ref 0–5)
SODIUM SERPL-SCNC: 143 MMOL/L (ref 136–145)
SP GR UR REFRACTOMETRY: 1.02 (ref 1–1.03)
UROBILINOGEN UR QL STRIP.AUTO: 0.2 EU/DL (ref 0.2–1)
WBC # BLD AUTO: 6.8 K/UL (ref 4.6–13.2)
WBC URNS QL MICRO: NORMAL /HPF (ref 0–4)

## 2024-01-11 PROCEDURE — 6360000004 HC RX CONTRAST MEDICATION: Performed by: EMERGENCY MEDICINE

## 2024-01-11 PROCEDURE — 85025 COMPLETE CBC W/AUTO DIFF WBC: CPT

## 2024-01-11 PROCEDURE — 81001 URINALYSIS AUTO W/SCOPE: CPT

## 2024-01-11 PROCEDURE — 74177 CT ABD & PELVIS W/CONTRAST: CPT

## 2024-01-11 PROCEDURE — 83690 ASSAY OF LIPASE: CPT

## 2024-01-11 PROCEDURE — 99285 EMERGENCY DEPT VISIT HI MDM: CPT

## 2024-01-11 PROCEDURE — 80053 COMPREHEN METABOLIC PANEL: CPT

## 2024-01-11 RX ORDER — POLYETHYLENE GLYCOL 3350 17 G/17G
17 POWDER, FOR SOLUTION ORAL DAILY PRN
Qty: 510 G | Refills: 0 | Status: SHIPPED | OUTPATIENT
Start: 2024-01-11 | End: 2024-02-10

## 2024-01-11 RX ORDER — CEPHALEXIN 500 MG/1
500 CAPSULE ORAL 3 TIMES DAILY
Qty: 21 CAPSULE | Refills: 0 | Status: SHIPPED | OUTPATIENT
Start: 2024-01-11 | End: 2024-01-18

## 2024-01-11 RX ADMIN — IOPAMIDOL 100 ML: 612 INJECTION, SOLUTION INTRAVENOUS at 18:15

## 2024-01-11 ASSESSMENT — LIFESTYLE VARIABLES
HOW MANY STANDARD DRINKS CONTAINING ALCOHOL DO YOU HAVE ON A TYPICAL DAY: PATIENT DOES NOT DRINK
HOW OFTEN DO YOU HAVE A DRINK CONTAINING ALCOHOL: NEVER

## 2024-01-11 ASSESSMENT — ENCOUNTER SYMPTOMS
ABDOMINAL PAIN: 1
EYES NEGATIVE: 1
CONSTIPATION: 1
CHEST TIGHTNESS: 0

## 2024-01-11 ASSESSMENT — PAIN - FUNCTIONAL ASSESSMENT: PAIN_FUNCTIONAL_ASSESSMENT: NONE - DENIES PAIN

## 2024-01-11 NOTE — ED PROVIDER NOTES
ensure there is no evidence of malignancy or intra-abdominal and or biliary infection.         The patient eloped prior to being discharged but CT scan resulted showing no evidence of intra-abdominal acute process however there is a hiatal hernia, renal cyst, and an inguinal lymph node all of which were reviewed over the phone after 2 patient identifiers.  The patient has some pyuria so we will place the patient on Keflex, and start MiraLAX for possible constipation and the patient will follow-up closely with her primary provider.  Given that I was able to have a good conversation with the patient we will discharge her home and she will return if at all worsened or concerned.    Workup and recommendations were reviewed with the patient and all questions were answered.  The patient understands the plan and will proceed with close outpatient care.  I have encouraged the patient to return if at all worsened or concerned.   Ronal Francis DO 8:41 PM      Patient was given the following medications:  Medications   iopamidol (ISOVUE-300) 61 % injection 100 mL (100 mLs IntraVENous Given 1/11/24 1815)       CONSULTS: (Who and What was discussed)  None    Chronic Conditions: Breast CA    Social Determinants affecting Dx or Tx: None    Records Reviewed (source and summary of external notes): Nursing Notes, Old Medical Records, Previous Radiology Studies, and Previous Laboratory Studies    Procedures          Diagnosis     Clinical Impression:   1. Abdominal distension    2. Acute UTI        Disposition: Mimi Solis, DNP  [address] [facility]  [address] [place] [id number] [phone]    In 1 day      [facility] EMERGENCY DEPT  [address] [place] [id number] [phone]    As needed, If symptoms worsen       Disclaimer: Sections of this note are dictated using utilizing voice recognition software.  Minor typographical errors may be present. If questions arise, please do not hesitate to

## 2024-01-12 NOTE — ED NOTES
Patient states she could not wait for CT results and needed to leave. Provider informed. PIV removed. Instructed to follow up with provider.

## 2024-02-08 ENCOUNTER — HOSPITAL ENCOUNTER (OUTPATIENT)
Facility: HOSPITAL | Age: 59
Setting detail: RECURRING SERIES
Discharge: HOME | End: 2024-02-11
Payer: MEDICARE

## 2024-02-08 PROCEDURE — 99213 OFFICE O/P EST LOW 20 MIN: CPT

## 2024-02-23 ENCOUNTER — HOSPITAL ENCOUNTER (EMERGENCY)
Facility: HOSPITAL | Age: 59
Discharge: HOME | End: 2024-02-23
Payer: MEDICARE

## 2024-02-23 VITALS
WEIGHT: 293 LBS | HEART RATE: 93 BPM | OXYGEN SATURATION: 99 % | SYSTOLIC BLOOD PRESSURE: 128 MMHG | HEIGHT: 66 IN | DIASTOLIC BLOOD PRESSURE: 57 MMHG | BODY MASS INDEX: 47.09 KG/M2 | TEMPERATURE: 98.1 F | RESPIRATION RATE: 18 BRPM

## 2024-02-23 DIAGNOSIS — Z23 IMMUNIZATION, TETANUS-DIPHTHERIA: ICD-10-CM

## 2024-02-23 DIAGNOSIS — M79.644 PAIN OF FINGER OF RIGHT HAND: Primary | ICD-10-CM

## 2024-02-23 DIAGNOSIS — S61.210A LACERATION OF RIGHT INDEX FINGER WITHOUT FOREIGN BODY WITHOUT DAMAGE TO NAIL, INITIAL ENCOUNTER: ICD-10-CM

## 2024-02-23 PROCEDURE — 90715 TDAP VACCINE 7 YRS/> IM: CPT

## 2024-02-23 PROCEDURE — 99284 EMERGENCY DEPT VISIT MOD MDM: CPT

## 2024-02-23 PROCEDURE — 6360000002 HC RX W HCPCS

## 2024-02-23 PROCEDURE — 90471 IMMUNIZATION ADMIN: CPT

## 2024-02-23 RX ADMIN — TETANUS TOXOID, REDUCED DIPHTHERIA TOXOID AND ACELLULAR PERTUSSIS VACCINE, ADSORBED 0.5 ML: 5; 2.5; 8; 8; 2.5 SUSPENSION INTRAMUSCULAR at 15:56

## 2024-02-23 ASSESSMENT — PAIN DESCRIPTION - LOCATION: LOCATION: FINGER (COMMENT WHICH ONE)

## 2024-02-23 ASSESSMENT — PAIN - FUNCTIONAL ASSESSMENT: PAIN_FUNCTIONAL_ASSESSMENT: 0-10

## 2024-02-23 ASSESSMENT — PAIN SCALES - GENERAL: PAINLEVEL_OUTOF10: 6

## 2024-02-23 NOTE — DISCHARGE INSTRUCTIONS
Keep covered and dry for the first 24 hours.  Please wash daily with soap and water.  Monitor for signs of infection such as redness drainage fevers.  Take medication as prescribed. Follow-up with your primary care physician within 3 days for reassessment. Bring the results from this visit with you for their review. Return to the ED immediately for any new, worsening, or persistent symptoms, including fever, vomiting, chest pain, shortness of breath, or any other medical concerns.

## 2024-02-23 NOTE — ED PROVIDER NOTES
[address]  [facility] [address]  [phone]    Schedule an appointment as soon as possible for a visit in 3 days            Medication List        CONTINUE taking these medications      Lancets Misc            ASK your doctor about these medications      amLODIPine 5 MG tablet  Commonly known as: NORVASC  Take 1 tablet by mouth daily     letrozole 2.5 MG tablet  Commonly known as: FEMARA     metFORMIN 500 MG tablet  Commonly known as: GLUCOPHAGE  Take 1 tablet by mouth daily (with breakfast)     rosuvastatin 20 MG tablet  Commonly known as: CRESTOR  Take 1 tablet by mouth daily               Dictation disclaimer:  Please note that this dictation was completed with D[employer], the [employer] voice recognition software.  Quite often unanticipated grammatical, syntax, homophones, and other interpretive errors are inadvertently transcribed by the computer software.  Please disregard these errors.  Please excuse any errors that have escaped final proofreading.         Esha Harris APRN - NP  02/23/24 [id number]

## 2024-03-25 ENCOUNTER — OFFICE VISIT (OUTPATIENT)
Age: 59
End: 2024-03-25
Payer: MEDICARE

## 2024-03-25 VITALS — HEIGHT: 66 IN | BODY MASS INDEX: 49.55 KG/M2

## 2024-03-25 DIAGNOSIS — M18.12 ARTHRITIS OF CARPOMETACARPAL (CMC) JOINT OF LEFT THUMB: Primary | ICD-10-CM

## 2024-03-25 PROCEDURE — 20600 DRAIN/INJ JOINT/BURSA W/O US: CPT | Performed by: ORTHOPAEDIC SURGERY

## 2024-03-25 PROCEDURE — 73110 X-RAY EXAM OF WRIST: CPT | Performed by: ORTHOPAEDIC SURGERY

## 2024-03-25 RX ORDER — LIDOCAINE HYDROCHLORIDE 10 MG/ML
0.5 INJECTION, SOLUTION INFILTRATION; PERINEURAL ONCE
Status: COMPLETED | OUTPATIENT
Start: 2024-03-25 | End: 2024-03-25

## 2024-03-25 RX ADMIN — LIDOCAINE HYDROCHLORIDE 0.5 ML: 10 INJECTION, SOLUTION INFILTRATION; PERINEURAL at 15:13

## 2024-03-25 NOTE — PROGRESS NOTES
Amanda Hernández is a 58 y.o. female right handed.  Worker's Compensation and legal considerations: none    Chief Complaint   Patient presents with    Hand Pain     Left hand pain     Pain Score:   0 - No pain    HPI: Patient presents today with complaints of left hand hand pain recurrence localized to the base of the thumb.    3/28/2022 HPI: Patient returns today for follow-up after left wrist DQ injection and left thumb CMC joint injection and DQ brace.  She reports to be doing much better.     Initial HPI: Patient presents with complaints of left wrist pain that radiates into the thumb.    Date of onset: Chronic  Injury: No  Prior Treatment:  Yes: Comment: Left wrist DQ injection and left thumb CMC joint injection and brace.    ROS: Review of Systems - General ROS: negative except HPI    Past Medical History:   Diagnosis Date    Arthritis     Class 3 severe obesity due to excess calories with serious comorbidity and body mass index (BMI) of 45.0 to 49.9 in adult ([facility]) 05/05/2023    Essential hypertension 05/24/2021    Hypertension     Malignant neoplasm of right female breast ([facility]) 10/20/2020    Dr Woodard (including lymph nodes) chemo/radiation.  T4dN2,ER/AR+,HER2-    Mixed hyperlipidemia 05/24/2021    Sleep apnea     CPAP    Type 2 diabetes mellitus with hyperglycemia, without long-term current use of insulin ([facility]) 11/11/2022    A1c 6.6-6.1 Cont metformin therapy       Past Surgical History:   Procedure Laterality Date    BREAST BIOPSY Right     9/2020    BREAST LUMPECTOMY Left 4/1/2021    EXPLORATION LEFT MASTECTOMY AND EVACUATION OF HEMATOMA performed by Raiza Brown MD at [facility] MAIN OR    COLONOSCOPY N/A 7/6/2022    COLONOSCOPY performed by Juan Conrad MD at [facility] ENDOSCOPY    IR PORT PLACEMENT EQUAL OR GREATER THAN 5 YEARS  11/4/2020    IR PORT PLACEMENT EQUAL OR GREATER THAN 5 YEARS 11/4/2020 [facility] RAD ANGIO    IR PORT PLACEMENT EQUAL OR GREATER THAN 5 YEARS  11/4/2020    IR REMOVE TUNNELED VAD W

## 2024-05-01 ENCOUNTER — OFFICE VISIT (OUTPATIENT)
Age: 59
End: 2024-05-01
Payer: MEDICARE

## 2024-05-01 VITALS
SYSTOLIC BLOOD PRESSURE: 118 MMHG | HEART RATE: 92 BPM | WEIGHT: 293 LBS | DIASTOLIC BLOOD PRESSURE: 78 MMHG | TEMPERATURE: 97.1 F | HEIGHT: 66 IN | OXYGEN SATURATION: 97 % | BODY MASS INDEX: 47.09 KG/M2

## 2024-05-01 DIAGNOSIS — I10 ESSENTIAL HYPERTENSION: Primary | ICD-10-CM

## 2024-05-01 DIAGNOSIS — E11.65 TYPE 2 DIABETES MELLITUS WITH HYPERGLYCEMIA, WITHOUT LONG-TERM CURRENT USE OF INSULIN (HCC): ICD-10-CM

## 2024-05-01 DIAGNOSIS — E78.2 MIXED HYPERLIPIDEMIA: ICD-10-CM

## 2024-05-01 DIAGNOSIS — C50.911 MALIGNANT NEOPLASM OF RIGHT FEMALE BREAST, UNSPECIFIED ESTROGEN RECEPTOR STATUS, UNSPECIFIED SITE OF BREAST (HCC): ICD-10-CM

## 2024-05-01 PROCEDURE — 3074F SYST BP LT 130 MM HG: CPT | Performed by: NURSE PRACTITIONER

## 2024-05-01 PROCEDURE — 3078F DIAST BP <80 MM HG: CPT | Performed by: NURSE PRACTITIONER

## 2024-05-01 PROCEDURE — 99213 OFFICE O/P EST LOW 20 MIN: CPT | Performed by: NURSE PRACTITIONER

## 2024-05-01 RX ORDER — AMLODIPINE BESYLATE 5 MG/1
5 TABLET ORAL DAILY
Qty: 93 TABLET | Refills: 1 | Status: SHIPPED | OUTPATIENT
Start: 2024-05-01

## 2024-05-01 ASSESSMENT — PATIENT HEALTH QUESTIONNAIRE - PHQ9
2. FEELING DOWN, DEPRESSED OR HOPELESS: NOT AT ALL
SUM OF ALL RESPONSES TO PHQ QUESTIONS 1-9: 0
1. LITTLE INTEREST OR PLEASURE IN DOING THINGS: NOT AT ALL
SUM OF ALL RESPONSES TO PHQ QUESTIONS 1-9: 0
SUM OF ALL RESPONSES TO PHQ9 QUESTIONS 1 & 2: 0
SUM OF ALL RESPONSES TO PHQ QUESTIONS 1-9: 0
SUM OF ALL RESPONSES TO PHQ QUESTIONS 1-9: 0

## 2024-05-01 NOTE — ASSESSMENT & PLAN NOTE
Well-controlled, continue current medications  The patient's blood pressure readings today are within the optimal range. A six-month supply of amlodipine has been prescribed. The patient has been advised to maintain a balanced diet, avoiding microwaveable foods, and foods high in sodium ie canned vegetables into her diet.

## 2024-05-01 NOTE — ASSESSMENT & PLAN NOTE
Well-controlled, continue current medications  A1c was 5.6. via insurance home visit today  Flako level 6m  Cont metformin therapy

## 2024-05-01 NOTE — PROGRESS NOTES
tablet by mouth daily (with breakfast)    Lancets MISC Use 1 time per day as directed. Please dispense what insurance will cover    letrozole (FEMARA) 2.5 MG tablet Take 1 tablet by mouth daily     No current facility-administered medications for this visit.       Past Surgical History:   Procedure Laterality Date    BREAST BIOPSY Right     9/2020    BREAST LUMPECTOMY Left 4/1/2021    EXPLORATION LEFT MASTECTOMY AND EVACUATION OF HEMATOMA performed by Raiza Brown MD at [facility] MAIN OR    COLONOSCOPY N/A 7/6/2022    COLONOSCOPY performed by Juan Conrad MD at [facility] ENDOSCOPY    IR PORT PLACEMENT EQUAL OR GREATER THAN 5 YEARS  11/4/2020    IR PORT PLACEMENT EQUAL OR GREATER THAN 5 YEARS 11/4/2020 [facility] RAD ANGIO    IR PORT PLACEMENT EQUAL OR GREATER THAN 5 YEARS  11/4/2020    IR REMOVE TUNNELED VAD W PORT  1/25/2022    MASTECTOMY Bilateral 3/31/2021    BILATERAL MASTECTOMY; LEFT SENTINEL NODE BIOPSY; RIGHT AXILLARY DESSECTION performed by Raiza Brown MD at [facility] MAIN OR    MASTECTOMY, BILATERAL Bilateral 03/31/2021    VASCULAR SURGERY       Allergies and Intolerances:   No Known Allergies  Family History:   Family History   Problem Relation Age of Onset    Colon Polyps Mother     Hypertension Father     Diabetes Mother      Social History:   She  reports that she quit smoking about 3 years ago. Her smoking use included cigarettes. She has never used smokeless tobacco.   Social History     Substance and Sexual Activity   Alcohol Use Not Currently     Immunization History:  Immunization History   Administered Date(s) Administered    COVID-19, MODERNA BLUE border, Primary or Immunocompromised, (age 12y+), IM, 100 mcg/0.5mL 03/05/2021, 04/02/2021    Influenza Virus Vaccine 10/19/2018, 11/22/2021, 11/11/2022    Influenza, FLUARIX, FLULAVAL, FLUZONE (age 6 mo+) AND AFLURIA, (age 3 y+), PF, 0.5mL 11/11/2022    Influenza, FLUCELVAX, (age 6 mo+), MDCK, PF, 0.5mL 11/08/2023    Pneumococcal, PCV-13, PREVNAR 13, (age

## 2024-08-07 ENCOUNTER — TELEPHONE (OUTPATIENT)
Facility: CLINIC | Age: 59
End: 2024-08-07

## 2024-08-07 ENCOUNTER — CLINICAL DOCUMENTATION (OUTPATIENT)
Facility: CLINIC | Age: 59
End: 2024-08-07

## 2024-08-07 NOTE — PROGRESS NOTES
Received this message from FSR for this patient:    Pt, Lidia Vanegas, was seen on 07/31/24, her daughter (jeanne Hernández 1965)  was with her and is asking to get a work note for that day . Please advise        Work note placed in mychart for patient to print and provide to her appointment.

## 2024-08-08 ENCOUNTER — HOSPITAL ENCOUNTER (OUTPATIENT)
Facility: HOSPITAL | Age: 59
Setting detail: RECURRING SERIES
Discharge: HOME | End: 2024-08-11
Payer: MEDICARE

## 2024-08-08 PROCEDURE — 99213 OFFICE O/P EST LOW 20 MIN: CPT

## 2024-08-22 ENCOUNTER — HOSPITAL ENCOUNTER (OUTPATIENT)
Facility: HOSPITAL | Age: 59
Discharge: HOME | End: 2024-08-22
Payer: MEDICARE

## 2024-08-22 DIAGNOSIS — C50.811 MALIGNANT NEOPLASM OF OVERLAPPING SITES OF RIGHT FEMALE BREAST, UNSPECIFIED ESTROGEN RECEPTOR STATUS (HCC): ICD-10-CM

## 2024-08-22 LAB — CREAT UR-MCNC: 0.7 MG/DL (ref 0.6–1.3)

## 2024-08-22 PROCEDURE — 74177 CT ABD & PELVIS W/CONTRAST: CPT

## 2024-08-22 PROCEDURE — 6360000004 HC RX CONTRAST MEDICATION: Performed by: PHYSICIAN ASSISTANT

## 2024-08-22 PROCEDURE — 82565 ASSAY OF CREATININE: CPT

## 2024-08-22 PROCEDURE — 2500000003 HC RX 250 WO HCPCS: Performed by: PHYSICIAN ASSISTANT

## 2024-08-22 RX ORDER — IOPAMIDOL 612 MG/ML
100 INJECTION, SOLUTION INTRAVASCULAR
Status: COMPLETED | OUTPATIENT
Start: 2024-08-22 | End: 2024-08-22

## 2024-08-22 RX ADMIN — IOPAMIDOL 100 ML: 612 INJECTION, SOLUTION INTRAVENOUS at 12:19

## 2024-08-22 RX ADMIN — BARIUM SULFATE 450 ML: 20 SUSPENSION ORAL at 10:37

## 2024-09-23 DIAGNOSIS — E78.2 MIXED HYPERLIPIDEMIA: ICD-10-CM

## 2024-09-23 DIAGNOSIS — E11.65 TYPE 2 DIABETES MELLITUS WITH HYPERGLYCEMIA, WITHOUT LONG-TERM CURRENT USE OF INSULIN (HCC): ICD-10-CM

## 2024-09-23 RX ORDER — ROSUVASTATIN CALCIUM 20 MG/1
20 TABLET, COATED ORAL DAILY
Qty: 90 TABLET | Refills: 3 | OUTPATIENT
Start: 2024-09-23

## 2024-10-03 DIAGNOSIS — E78.2 MIXED HYPERLIPIDEMIA: ICD-10-CM

## 2024-10-03 RX ORDER — ROSUVASTATIN CALCIUM 20 MG/1
20 TABLET, COATED ORAL DAILY
Qty: 90 TABLET | Refills: 3 | OUTPATIENT
Start: 2024-10-03

## 2024-10-05 DIAGNOSIS — E11.65 TYPE 2 DIABETES MELLITUS WITH HYPERGLYCEMIA, WITHOUT LONG-TERM CURRENT USE OF INSULIN (HCC): ICD-10-CM

## 2024-10-15 ENCOUNTER — HOSPITAL ENCOUNTER (OUTPATIENT)
Facility: HOSPITAL | Age: 59
Setting detail: SPECIMEN
Discharge: HOME | End: 2024-10-18
Payer: MEDICARE

## 2024-10-15 DIAGNOSIS — I10 ESSENTIAL HYPERTENSION: ICD-10-CM

## 2024-10-15 DIAGNOSIS — E78.2 MIXED HYPERLIPIDEMIA: ICD-10-CM

## 2024-10-15 DIAGNOSIS — C50.911 MALIGNANT NEOPLASM OF RIGHT FEMALE BREAST, UNSPECIFIED ESTROGEN RECEPTOR STATUS, UNSPECIFIED SITE OF BREAST (HCC): ICD-10-CM

## 2024-10-15 DIAGNOSIS — E11.65 TYPE 2 DIABETES MELLITUS WITH HYPERGLYCEMIA, WITHOUT LONG-TERM CURRENT USE OF INSULIN (HCC): ICD-10-CM

## 2024-10-15 LAB
ALBUMIN SERPL-MCNC: 3.9 G/DL (ref 3.4–5)
ALBUMIN/GLOB SERPL: 1.1 (ref 0.8–1.7)
ALP SERPL-CCNC: 78 U/L (ref 45–117)
ALT SERPL-CCNC: 28 U/L (ref 13–56)
ANION GAP SERPL CALC-SCNC: 5 MMOL/L (ref 3–18)
AST SERPL-CCNC: 21 U/L (ref 10–38)
BASOPHILS # BLD: 0 K/UL (ref 0–0.1)
BASOPHILS NFR BLD: 0 % (ref 0–2)
BILIRUB SERPL-MCNC: 1.1 MG/DL (ref 0.2–1)
BUN SERPL-MCNC: 16 MG/DL (ref 7–18)
BUN/CREAT SERPL: 20 (ref 12–20)
CALCIUM SERPL-MCNC: 9.8 MG/DL (ref 8.5–10.1)
CHLORIDE SERPL-SCNC: 107 MMOL/L (ref 100–111)
CHOLEST SERPL-MCNC: 111 MG/DL
CO2 SERPL-SCNC: 26 MMOL/L (ref 21–32)
CREAT SERPL-MCNC: 0.8 MG/DL (ref 0.6–1.3)
CREAT UR-MCNC: 226 MG/DL (ref 30–125)
DIFFERENTIAL METHOD BLD: ABNORMAL
EOSINOPHIL # BLD: 0.1 K/UL (ref 0–0.4)
EOSINOPHIL NFR BLD: 1 % (ref 0–5)
ERYTHROCYTE [DISTWIDTH] IN BLOOD BY AUTOMATED COUNT: 13.2 % (ref 11.6–14.5)
EST. AVERAGE GLUCOSE BLD GHB EST-MCNC: 134 MG/DL
GLOBULIN SER CALC-MCNC: 3.7 G/DL (ref 2–4)
GLUCOSE SERPL-MCNC: 93 MG/DL (ref 74–99)
HBA1C MFR BLD: 6.3 % (ref 4.2–5.6)
HCT VFR BLD AUTO: 42 % (ref 35–45)
HDLC SERPL-MCNC: 41 MG/DL (ref 40–60)
HDLC SERPL: 2.7 (ref 0–5)
HGB BLD-MCNC: 12.5 G/DL (ref 12–16)
IMM GRANULOCYTES # BLD AUTO: 0 K/UL (ref 0–0.04)
IMM GRANULOCYTES NFR BLD AUTO: 0 % (ref 0–0.5)
LDLC SERPL CALC-MCNC: 58.4 MG/DL (ref 0–100)
LIPID PANEL: NORMAL
LYMPHOCYTES # BLD: 1.4 K/UL (ref 0.9–3.6)
LYMPHOCYTES NFR BLD: 20 % (ref 21–52)
MCH RBC QN AUTO: 28 PG (ref 24–34)
MCHC RBC AUTO-ENTMCNC: 29.8 G/DL (ref 31–37)
MCV RBC AUTO: 94.2 FL (ref 78–100)
MICROALBUMIN UR-MCNC: 2.16 MG/DL (ref 0–3)
MICROALBUMIN/CREAT UR-RTO: 10 MG/G (ref 0–30)
MONOCYTES # BLD: 0.6 K/UL (ref 0.05–1.2)
MONOCYTES NFR BLD: 8 % (ref 3–10)
NEUTS SEG # BLD: 5 K/UL (ref 1.8–8)
NEUTS SEG NFR BLD: 71 % (ref 40–73)
NRBC # BLD: 0 K/UL (ref 0–0.01)
NRBC BLD-RTO: 0 PER 100 WBC
PLATELET # BLD AUTO: 370 K/UL (ref 135–420)
PMV BLD AUTO: 9.4 FL (ref 9.2–11.8)
POTASSIUM SERPL-SCNC: 4 MMOL/L (ref 3.5–5.5)
PROT SERPL-MCNC: 7.6 G/DL (ref 6.4–8.2)
RBC # BLD AUTO: 4.46 M/UL (ref 4.2–5.3)
SODIUM SERPL-SCNC: 138 MMOL/L (ref 136–145)
TRIGL SERPL-MCNC: 58 MG/DL
VLDLC SERPL CALC-MCNC: 11.6 MG/DL
WBC # BLD AUTO: 7 K/UL (ref 4.6–13.2)

## 2024-10-15 PROCEDURE — 82570 ASSAY OF URINE CREATININE: CPT

## 2024-10-15 PROCEDURE — 85025 COMPLETE CBC W/AUTO DIFF WBC: CPT

## 2024-10-15 PROCEDURE — 83036 HEMOGLOBIN GLYCOSYLATED A1C: CPT

## 2024-10-15 PROCEDURE — 80061 LIPID PANEL: CPT

## 2024-10-15 PROCEDURE — 82043 UR ALBUMIN QUANTITATIVE: CPT

## 2024-10-15 PROCEDURE — 80053 COMPREHEN METABOLIC PANEL: CPT

## 2024-10-15 PROCEDURE — 36415 COLL VENOUS BLD VENIPUNCTURE: CPT

## 2024-10-22 ENCOUNTER — OFFICE VISIT (OUTPATIENT)
Facility: CLINIC | Age: 59
End: 2024-10-22

## 2024-10-22 VITALS
RESPIRATION RATE: 20 BRPM | OXYGEN SATURATION: 99 % | TEMPERATURE: 97.4 F | HEIGHT: 66 IN | HEART RATE: 91 BPM | DIASTOLIC BLOOD PRESSURE: 86 MMHG | WEIGHT: 283 LBS | BODY MASS INDEX: 45.48 KG/M2 | SYSTOLIC BLOOD PRESSURE: 134 MMHG

## 2024-10-22 DIAGNOSIS — C50.911 MALIGNANT NEOPLASM OF RIGHT FEMALE BREAST, UNSPECIFIED ESTROGEN RECEPTOR STATUS, UNSPECIFIED SITE OF BREAST (HCC): ICD-10-CM

## 2024-10-22 DIAGNOSIS — E78.2 MIXED HYPERLIPIDEMIA: ICD-10-CM

## 2024-10-22 DIAGNOSIS — Z23 IMMUNIZATION DUE: ICD-10-CM

## 2024-10-22 DIAGNOSIS — E66.01 CLASS 3 SEVERE OBESITY DUE TO EXCESS CALORIES WITH SERIOUS COMORBIDITY AND BODY MASS INDEX (BMI) OF 45.0 TO 49.9 IN ADULT: ICD-10-CM

## 2024-10-22 DIAGNOSIS — E66.813 CLASS 3 SEVERE OBESITY DUE TO EXCESS CALORIES WITH SERIOUS COMORBIDITY AND BODY MASS INDEX (BMI) OF 45.0 TO 49.9 IN ADULT: ICD-10-CM

## 2024-10-22 DIAGNOSIS — Z23 NEED FOR PROPHYLACTIC VACCINATION AGAINST STREPTOCOCCUS PNEUMONIAE (PNEUMOCOCCUS): ICD-10-CM

## 2024-10-22 DIAGNOSIS — E80.6 HYPERBILIRUBINEMIA: ICD-10-CM

## 2024-10-22 DIAGNOSIS — Z71.89 ACP (ADVANCE CARE PLANNING): ICD-10-CM

## 2024-10-22 DIAGNOSIS — Z00.00 MEDICARE ANNUAL WELLNESS VISIT, SUBSEQUENT: Primary | ICD-10-CM

## 2024-10-22 DIAGNOSIS — I10 UNCONTROLLED HYPERTENSION: ICD-10-CM

## 2024-10-22 DIAGNOSIS — E11.65 TYPE 2 DIABETES MELLITUS WITH HYPERGLYCEMIA, WITHOUT LONG-TERM CURRENT USE OF INSULIN (HCC): ICD-10-CM

## 2024-10-22 DIAGNOSIS — Z23 NEED FOR PROPHYLACTIC VACCINATION AND INOCULATION AGAINST VARICELLA: ICD-10-CM

## 2024-10-22 RX ORDER — ROSUVASTATIN CALCIUM 20 MG/1
20 TABLET, COATED ORAL DAILY
Qty: 90 TABLET | Refills: 3 | Status: SHIPPED | OUTPATIENT
Start: 2024-10-22

## 2024-10-22 RX ORDER — SEMAGLUTIDE 1.34 MG/ML
1 INJECTION, SOLUTION SUBCUTANEOUS WEEKLY
COMMUNITY

## 2024-10-22 RX ORDER — SPIRONOLACTONE 25 MG/1
25 TABLET ORAL DAILY
Qty: 90 TABLET | Refills: 1 | Status: SHIPPED | OUTPATIENT
Start: 2024-10-22

## 2024-10-22 SDOH — ECONOMIC STABILITY: FOOD INSECURITY: WITHIN THE PAST 12 MONTHS, YOU WORRIED THAT YOUR FOOD WOULD RUN OUT BEFORE YOU GOT MONEY TO BUY MORE.: PATIENT DECLINED

## 2024-10-22 SDOH — ECONOMIC STABILITY: FOOD INSECURITY: WITHIN THE PAST 12 MONTHS, THE FOOD YOU BOUGHT JUST DIDN'T LAST AND YOU DIDN'T HAVE MONEY TO GET MORE.: PATIENT DECLINED

## 2024-10-22 SDOH — ECONOMIC STABILITY: INCOME INSECURITY: HOW HARD IS IT FOR YOU TO PAY FOR THE VERY BASICS LIKE FOOD, HOUSING, MEDICAL CARE, AND HEATING?: PATIENT DECLINED

## 2024-10-22 ASSESSMENT — PATIENT HEALTH QUESTIONNAIRE - PHQ9
SUM OF ALL RESPONSES TO PHQ9 QUESTIONS 1 & 2: 0
SUM OF ALL RESPONSES TO PHQ QUESTIONS 1-9: 0
2. FEELING DOWN, DEPRESSED OR HOPELESS: NOT AT ALL
SUM OF ALL RESPONSES TO PHQ QUESTIONS 1-9: 0
1. LITTLE INTEREST OR PLEASURE IN DOING THINGS: NOT AT ALL

## 2024-10-22 NOTE — ACP (ADVANCE CARE PLANNING)
Advance Care Planning     Advance Care Planning (ACP) Physician/NP/PA Conversation    Date of Conversation: 10/22/2024  Conducted with: Patient with Decision Making Capacity    Healthcare Decision Maker:      Primary Decision Maker: Yoav Hernández - Spouse - [phone]    Click here to complete Healthcare Decision Makers including selection of the Healthcare Decision Maker Relationship (ie \"Primary\")  Today we documented Decision Maker(s) consistent with Legal Next of Kin hierarchy.    Care Preferences:    Hospitalization:  \"If your health worsens and it becomes clear that your chance of recovery is unlikely, what would be your preference regarding hospitalization?\"  The patient would prefer hospitalization.    Ventilation:  \"If you were unable to breath on your own and your chance of recovery was unlikely, what would be your preference about the use of a ventilator (breathing machine) if it was available to you?\"  The patient would NOT desire the use of a ventilator.    Resuscitation:  \"In the event your heart stopped as a result of an underlying serious health condition, would you want attempts made to restart your heart, or would you prefer a natural death?\"  Yes, attempt to resuscitate.    ventilation preferences, hospitalization preferences, and resuscitation preferences    Conversation Outcomes / Follow-Up Plan:  ACP in process - completing/providing documents  Reviewed DNR/DNI and patient elects Full Code (Attempt Resuscitation)    Length of Voluntary ACP Conversation in minutes:  16 minutes    Mimi Valenzuela DNP

## 2024-10-22 NOTE — PATIENT INSTRUCTIONS
Eating Healthy Foods: Care Instructions  With every meal, you can make healthy food choices. Try to eat a variety of fruits, vegetables, whole grains, lean proteins, and low-fat dairy products. This can help you get the right balance of nutrients, including vitamins and minerals. Small changes add up over time. You can start by adding one healthy food to your meals each day.    Try to make half your plate fruits and vegetables, one-fourth whole grains, and one-fourth lean proteins. Try including dairy with your meals.   Eat more fruits and vegetables. Try to have them with most meals and snacks.   Foods for healthy eating        Fruits   These can be fresh, frozen, canned, or dried.  Try to choose whole fruit rather than fruit juice.  Eat a variety of colors.        Vegetables   These can be fresh, frozen, canned, or dried.  Beans, peas, and lentils count too.        Whole grains   Choose whole-grain breads, cereals, and noodles.  Try brown rice.        Lean proteins   These can include lean meat, poultry, fish, and eggs.  You can also have tofu, beans, peas, lentils, nuts, and seeds.        Dairy   Try milk, yogurt, and cheese.  Choose low-fat or fat-free when you can.  If you need to, use lactose-free milk or fortified plant-based milk products, such as soy milk.        Water   Drink water when you're thirsty.  Limit sugar-sweetened drinks, including soda, fruit drinks, and sports drinks.  Where can you learn more?  Go to https://www.[employer].net/patientEd and enter T756 to learn more about \"Eating Healthy Foods: Care Instructions.\"  Current as of: September 20, 2023  Content Version: 14.2  © 2024 [employer].   Care instructions adapted under license by [employer]. If you have questions about a medical condition or this instruction, always ask your healthcare professional. [employer] disclaims any warranty or liability for your use of this information.           Starting a Weight

## 2024-10-22 NOTE — PROGRESS NOTES
Amanda Hernández is a 59 y.o. female (: 1965) presenting to address:    Chief Complaint   Patient presents with    Medicare AWV       Vitals:    10/22/24 1047   BP: 134/86   Pulse:    Resp:    Temp:    SpO2:        \"Have you been to the ER, urgent care clinic since your last visit?  Hospitalized since your last visit?\"    NO    “Have you seen or consulted any other health care providers outside of [facility] since your last visit?”    NO       Immunizations Administered       Name Date Dose Route    Influenza, FLUCELVAX, (age 6 mo+) IM, Trivalent PF, 0.5mL 10/22/2024 0.5 mL Intramuscular    Site: Deltoid- Right    Lot: [id number]    NDC: 71688-610-67    Pneumococcal, PCV20, PREVNAR 20, (age 6w+), IM, 0.5mL 10/22/2024 0.5 mL Intramuscular    Site: Deltoid- Left    Lot: [id number]    NDC: 0005-2000-10            Influenza Fluad was administered to pt's right deltoid pt reported a slight tingle in her right arm. Pt declined any pain. Verified if pt was okay and per pt \"she is fine\".

## 2024-10-22 NOTE — PROGRESS NOTES
[facility] [address]  [address]  [place], [place] [address]  [phone] office/[phone] fax    10/22/2024    Amanda Hernández 1965 is a pleasant Black /  female.       History of Present Illness  The patient is a 59-year-old female who presents for her annual wellness visit.    She is prepared to receive her influenza vaccine today. She has previously received the COVID-19 vaccine and is due for the bivalent vaccine. She is also due for the pneumococcal vaccine. Her next Pap smear is not due until 2027, and her next colonoscopy is not due until 2032. Her tetanus vaccine is up-to-date until 2034. She has regular eye check-ups at [facility]. She has an upcoming appointment with her weight loss specialist, Dr. Luis, who has prescribed her Ozempic 1 mg weekly. She is currently taking metformin 500 mg, which she tolerates well.    HTN. She reports that her blood pressure was high during her last visit to Dr. Luis on 10/11/2024. She was previously on chlorthalidone, which was discontinued on 05/20/2021, and was started on Aldactone due to hypokalemia. She was also taking amlodipine and spironolactone daily in 2022 without any side effects. However, she stopped taking Aldactone on 05/05/2023.  She reports no swelling in her feet.    Cholesterol.  Tolerating statin without side effects.    Rt breast cancer. She is under the care of Dr. Woodard, hemoc, for right breast cancer and is currently in remission, taking Femara as part of her treatment. sees Dr. Flaherty, a surgeon, annually for her breast cancer. Dr Mo, rad annually.    SOCIAL HISTORY  She is doing private duty.      Physical Exam    Physical Exam  Constitutional:       Appearance: Normal appearance.   Eyes:      Extraocular Movements: Extraocular movements intact.      Conjunctiva/sclera: Conjunctivae normal.   Cardiovascular:      Rate and Rhythm: Normal rate and regular rhythm.

## 2024-10-22 NOTE — PROGRESS NOTES
Medicare Annual Wellness Visit    Amanda Hernández is here for Medicare AWV    Assessment & Plan   Medicare annual wellness visit, subsequent  Assessment & Plan:  Health Maintenance.  She will receive the influenza and Prevnar 20 vaccines today. She is advised to get the COVID-19 bivalent vaccine at her pharmacy in 2 weeks and the shingles vaccine when ready. She is up to date on her Pap smear, colonoscopy, and tetanus vaccine.    Specialists:  Dr Luis, wt loss  Dr Woodard, onc  [facility]  Dr Flaherty, surgeon  Dr Mo, rad    Orders:  -     pneumococcal 20-valent conjugat (PREVNAR) 0.5 ML LUCIANO inj; Inject 0.5 mLs into the muscle once for 1 dose, Disp-0.5 mL, R-0Print  -     zoster recombinant adjuvanted vaccine (SHINGRIX) 50 MCG/0.5ML SUSR injection; Inject 0.5 mLs into the muscle once for 1 dose, Disp-0.5 mL, R-0Print  -     Pneumococcal, PCV20, PREVNAR 20, (age 6w+), IM, PF  -     Influenza, FLUCELVAX Trivalent, (age 6 mo+) IM, Preservative Free, 0.5mL  ACP (advance care planning)  Assessment & Plan:  Advanced Care Planning: Patient educated on the importance of an advanced care plan and paperwork was given to the patient today. Asked patient to read over the information and bring back at next appointment.    elects Full Code (Attempt Resuscitation)    Patient had originally stated she wanted to be a DNR but I do not feel that she is educated enough to confirm DNR.  After some discussion, patient will do further research before stating DNR.  Immunization due  Need for prophylactic vaccination against Streptococcus pneumoniae (pneumococcus)  -     pneumococcal 20-valent conjugat (PREVNAR) 0.5 ML LUCIANO inj; Inject 0.5 mLs into the muscle once for 1 dose, Disp-0.5 mL, R-0Print  Need for prophylactic vaccination and inoculation against varicella  -     zoster recombinant adjuvanted vaccine (SHINGRIX) 50 MCG/0.5ML SUSR injection; Inject 0.5 mLs into the muscle once for 1 dose, Disp-0.5 mL, R-0Print  Uncontrolled

## 2024-10-23 PROBLEM — Z71.89 ACP (ADVANCE CARE PLANNING): Status: ACTIVE | Noted: 2024-10-23

## 2024-10-23 PROBLEM — I10 UNCONTROLLED HYPERTENSION: Status: ACTIVE | Noted: 2021-05-24

## 2024-10-23 PROBLEM — E80.6 HYPERBILIRUBINEMIA: Status: ACTIVE | Noted: 2024-10-23

## 2024-10-23 NOTE — ASSESSMENT & PLAN NOTE
Health Maintenance.  She will receive the influenza and Prevnar 20 vaccines today. She is advised to get the COVID-19 bivalent vaccine at her pharmacy in 2 weeks and the shingles vaccine when ready. She is up to date on her Pap smear, colonoscopy, and tetanus vaccine.    Specialists:  Dr Luis, wt loss  Dr Woodard, onc  [facility]  Dr Flaherty, surgeon  Dr Mo, rad

## 2024-10-23 NOTE — ASSESSMENT & PLAN NOTE
Advanced Care Planning: Patient educated on the importance of an advanced care plan and paperwork was given to the patient today. Asked patient to read over the information and bring back at next appointment.    elects Full Code (Attempt Resuscitation)    Patient had originally stated she wanted to be a DNR but I do not feel that she is educated enough to confirm DNR.  After some discussion, patient will do further research before stating DNR.

## 2024-10-23 NOTE — ASSESSMENT & PLAN NOTE
Weight down from 296-283.  Continue to follow-up with [facility]  Continue Ozempic as prescribed by the weight loss center  BMI is out of normal parameters and plan is as follows: Discussed in great detail on diet, portion control, exercise, avoiding foods high in sugar, carbs and starches, fatty/greasy foods and to eat until satisfied not til full. I have counseled this patient on diet and exercise regimens as well.

## 2024-10-23 NOTE — ASSESSMENT & PLAN NOTE
A1c 6.1-6.3  Microalbumin normal at 2.16  Urine creatinine elevated 226  Increase metformin from 500 mg daily to 1000 mg daily  Reduce carbs and sweets in diet  Reduce calories by exercise  Increase water intake  Recheck A1c 3 months

## 2024-10-23 NOTE — ASSESSMENT & PLAN NOTE
TG 58; LDL 58  Continue Crestor 20 mg daily  Recheck level 12 months    Reduce fried fatty or oily foods in diet  Limit red meats, processed foods, and alcohol.    Limit fast food  Increase physical activity to 60 minutes of moderate intensity aerobic exercise per week.  Increase water intake  Reduce alcohol intake    How to raise HDL if low:  Consume oats, beans, legumes, olive oil, whole grains, nuts, seeds, fatty fish, and berries.  Lose weight/fat  Reduce alcohol consumption  Smoking cessation    11/9/2023 OV note:  -85; LDL 84  Continue Crestor 20 mg daily  Recheck level 12 months

## 2024-10-23 NOTE — ASSESSMENT & PLAN NOTE
Right breast cancer: DX Sept 2020. Underwent bilateral breast surgery (mastectomy) with Dr Raiza Brown. She was noted to have + lymph nodes to both breasts. She completed chemo back in June 2020. She continues Femara treatment under Dr. Wodoard.  She sees Dr Flaherty, surgeon and Dr Mo rad annually.

## 2024-10-23 NOTE — ASSESSMENT & PLAN NOTE
Weight down from 296-283.  Continue to follow-up with [facility]  Continue Ozempic as prescribed by the weight loss center  BMI is out of normal parameters and plan is as follows: Discussed in great detail on diet, portion control, exercise, avoiding foods high in sugar, carbs and starches, fatty/greasy foods and to eat until satisfied not til full. I have counseled this patient on diet and exercise regimens as well

## 2024-10-23 NOTE — ASSESSMENT & PLAN NOTE
Chronic, not at goal (unstable),   BP even upon recheck remains elevated  She reports that her blood pressure was high during her last visit to Dr. Luis on 10/11/2024.     Chlorthalidone discontinued 5/20/2021  Aldactone was initiated 5/22/2021 due to hypokalemia  Continued amlodipine and spironolactone daily in 2022 without any side effects.  Patient discontinued Aldactone on 05/05/2023, reasons unknown    Continue amlodipine 5 mg daily.  If BP remains uncontrolled, will increase amlodipine to 10 mg  Restart Aldactone 25 mg daily    Monitor blood pressure and call office with readings greater than 139/89  BMP 3 months  Follow-up 3 months    The patient has been advised to maintain a balanced diet, avoiding microwaveable foods, and foods high in sodium ie canned vegetables into her diet.    5-Fu Pregnancy And Lactation Text: This medication is Pregnancy Category X and contraindicated in pregnancy and in women who may become pregnant. It is unknown if this medication is excreted in breast milk.

## 2024-10-23 NOTE — ASSESSMENT & PLAN NOTE
Her bilirubin level is slightly elevated at 1.1, while all other liver tests are within normal limits. This could be due to a lab error or Gilbert's syndrome. Bilirubin will be rechecked in 3 months along with other labs to confirm the diagnosis. She will provide a urine sample for urinalysis.

## 2024-11-03 DIAGNOSIS — I10 ESSENTIAL HYPERTENSION: ICD-10-CM

## 2024-11-04 RX ORDER — AMLODIPINE BESYLATE 5 MG/1
5 TABLET ORAL DAILY
Qty: 93 TABLET | Refills: 0 | Status: SHIPPED | OUTPATIENT
Start: 2024-11-04

## 2024-11-04 NOTE — TELEPHONE ENCOUNTER
Last Office visit:  10/22/2024    Last Filled: 5/1/2024; Qty 93 w/ 1 refill    Follow up visit:    Future Appointments   Date Time Provider Department Center   1/16/2025 11:00 AM [facility] LAB VISIT [facility] DE[facility]   1/23/2025  9:30 AM Mimi Valenzuela DNP [facility] DEP   2/6/2025  2:45 PM Chilango Mo MD [facility]

## 2024-11-22 PROBLEM — Z00.00 MEDICARE ANNUAL WELLNESS VISIT, SUBSEQUENT: Status: RESOLVED | Noted: 2023-11-09 | Resolved: 2024-11-22

## 2025-01-15 ENCOUNTER — HOSPITAL ENCOUNTER (OUTPATIENT)
Facility: HOSPITAL | Age: 60
Setting detail: SPECIMEN
Discharge: HOME | End: 2025-01-18
Payer: MEDICARE

## 2025-01-15 DIAGNOSIS — E11.65 TYPE 2 DIABETES MELLITUS WITH HYPERGLYCEMIA, WITHOUT LONG-TERM CURRENT USE OF INSULIN (HCC): ICD-10-CM

## 2025-01-15 DIAGNOSIS — E80.6 HYPERBILIRUBINEMIA: ICD-10-CM

## 2025-01-15 DIAGNOSIS — I10 UNCONTROLLED HYPERTENSION: ICD-10-CM

## 2025-01-15 LAB
ANION GAP SERPL CALC-SCNC: 5 MMOL/L (ref 3–18)
BILIRUB DIRECT SERPL-MCNC: 0.2 MG/DL (ref 0–0.2)
BILIRUB INDIRECT SERPL-MCNC: 0.9 MG/DL
BILIRUB SERPL-MCNC: 1.1 MG/DL (ref 0.2–1)
BUN SERPL-MCNC: 14 MG/DL (ref 7–18)
BUN/CREAT SERPL: 14 (ref 12–20)
CALCIUM SERPL-MCNC: 10.2 MG/DL (ref 8.5–10.1)
CHLORIDE SERPL-SCNC: 106 MMOL/L (ref 100–111)
CO2 SERPL-SCNC: 28 MMOL/L (ref 21–32)
CREAT SERPL-MCNC: 1 MG/DL (ref 0.6–1.3)
EST. AVERAGE GLUCOSE BLD GHB EST-MCNC: 128 MG/DL
GLUCOSE SERPL-MCNC: 87 MG/DL (ref 74–99)
HBA1C MFR BLD: 6.1 % (ref 4.2–5.6)
POTASSIUM SERPL-SCNC: 3.8 MMOL/L (ref 3.5–5.5)
SODIUM SERPL-SCNC: 139 MMOL/L (ref 136–145)

## 2025-01-15 PROCEDURE — 36415 COLL VENOUS BLD VENIPUNCTURE: CPT

## 2025-01-15 PROCEDURE — 82977 ASSAY OF GGT: CPT

## 2025-01-15 PROCEDURE — 80048 BASIC METABOLIC PNL TOTAL CA: CPT

## 2025-01-15 PROCEDURE — 82247 BILIRUBIN TOTAL: CPT

## 2025-01-15 PROCEDURE — 83036 HEMOGLOBIN GLYCOSYLATED A1C: CPT

## 2025-01-15 PROCEDURE — 82248 BILIRUBIN DIRECT: CPT

## 2025-01-16 LAB — GGT SERPL-CCNC: 29 U/L (ref 5–55)

## 2025-02-01 DIAGNOSIS — I10 ESSENTIAL HYPERTENSION: ICD-10-CM

## 2025-02-03 RX ORDER — AMLODIPINE BESYLATE 5 MG/1
5 TABLET ORAL DAILY
Qty: 90 TABLET | Refills: 0 | Status: SHIPPED | OUTPATIENT
Start: 2025-02-03

## 2025-02-03 NOTE — TELEPHONE ENCOUNTER
PCP: Mimi Valenzuela DNP    LAST OFFICE VISIT: 10/22/2024    LAST REFILL PER CHART:  Medication:amLODIPine (NORVASC) 5 MG tablet   Ordered On:11/04/2024  Instructions:TAKE ONE TABLET BY MOUTH ONCE DAILY   Dispense:93 tablets  Refills:0      Future Appointments   Date Time Provider Department Center   2/6/2025  2:45 PM Chilango Mo MD [facility]   2/26/2025  2:45 PM Mimi Valenzuela DNP [facility] DEP

## 2025-03-06 ENCOUNTER — HOSPITAL ENCOUNTER (OUTPATIENT)
Facility: HOSPITAL | Age: 60
Setting detail: RECURRING SERIES
Discharge: HOME | End: 2025-03-09

## 2025-03-13 ENCOUNTER — TELEPHONE (OUTPATIENT)
Facility: CLINIC | Age: 60
End: 2025-03-13

## 2025-03-13 NOTE — TELEPHONE ENCOUNTER
Pt wanted pcp to fill out paperwork for her to get rehired. Advised pt of fee that would have to be paid when paperwork is picked up. Advised her of timeframe to wait for paperwork to be filled out. Pt verbalized understanding.  Pt will bring paperwork with her tomorrow at her appointment. Please advise.

## 2025-03-14 ENCOUNTER — OFFICE VISIT (OUTPATIENT)
Facility: CLINIC | Age: 60
End: 2025-03-14
Payer: MEDICARE

## 2025-03-14 VITALS
DIASTOLIC BLOOD PRESSURE: 68 MMHG | RESPIRATION RATE: 18 BRPM | SYSTOLIC BLOOD PRESSURE: 108 MMHG | WEIGHT: 267 LBS | OXYGEN SATURATION: 100 % | TEMPERATURE: 97.2 F | HEART RATE: 83 BPM | BODY MASS INDEX: 42.91 KG/M2 | HEIGHT: 66 IN

## 2025-03-14 DIAGNOSIS — E66.01 CLASS 3 SEVERE OBESITY DUE TO EXCESS CALORIES WITH SERIOUS COMORBIDITY AND BODY MASS INDEX (BMI) OF 45.0 TO 49.9 IN ADULT: ICD-10-CM

## 2025-03-14 DIAGNOSIS — M17.11 PRIMARY OSTEOARTHRITIS OF RIGHT KNEE: ICD-10-CM

## 2025-03-14 DIAGNOSIS — E78.2 MIXED HYPERLIPIDEMIA: ICD-10-CM

## 2025-03-14 DIAGNOSIS — E83.52 HYPERCALCEMIA: ICD-10-CM

## 2025-03-14 DIAGNOSIS — I10 ESSENTIAL HYPERTENSION: Primary | ICD-10-CM

## 2025-03-14 DIAGNOSIS — E66.813 CLASS 3 SEVERE OBESITY DUE TO EXCESS CALORIES WITH SERIOUS COMORBIDITY AND BODY MASS INDEX (BMI) OF 45.0 TO 49.9 IN ADULT: ICD-10-CM

## 2025-03-14 DIAGNOSIS — E11.65 TYPE 2 DIABETES MELLITUS WITH HYPERGLYCEMIA, WITHOUT LONG-TERM CURRENT USE OF INSULIN (HCC): ICD-10-CM

## 2025-03-14 DIAGNOSIS — E80.6 HYPERBILIRUBINEMIA: ICD-10-CM

## 2025-03-14 PROCEDURE — 2022F DILAT RTA XM EVC RTNOPTHY: CPT | Performed by: NURSE PRACTITIONER

## 2025-03-14 PROCEDURE — 3044F HG A1C LEVEL LT 7.0%: CPT | Performed by: NURSE PRACTITIONER

## 2025-03-14 PROCEDURE — 1036F TOBACCO NON-USER: CPT | Performed by: NURSE PRACTITIONER

## 2025-03-14 PROCEDURE — 99214 OFFICE O/P EST MOD 30 MIN: CPT | Performed by: NURSE PRACTITIONER

## 2025-03-14 PROCEDURE — 3078F DIAST BP <80 MM HG: CPT | Performed by: NURSE PRACTITIONER

## 2025-03-14 PROCEDURE — 3017F COLORECTAL CA SCREEN DOC REV: CPT | Performed by: NURSE PRACTITIONER

## 2025-03-14 PROCEDURE — G8427 DOCREV CUR MEDS BY ELIG CLIN: HCPCS | Performed by: NURSE PRACTITIONER

## 2025-03-14 PROCEDURE — G8417 CALC BMI ABV UP PARAM F/U: HCPCS | Performed by: NURSE PRACTITIONER

## 2025-03-14 PROCEDURE — 3074F SYST BP LT 130 MM HG: CPT | Performed by: NURSE PRACTITIONER

## 2025-03-14 RX ORDER — SEMAGLUTIDE 1.34 MG/ML
2 INJECTION, SOLUTION SUBCUTANEOUS WEEKLY
Qty: 3 ADJUSTABLE DOSE PRE-FILLED PEN SYRINGE | Refills: 1 | Status: SHIPPED | OUTPATIENT
Start: 2025-03-14

## 2025-03-14 RX ORDER — AMLODIPINE BESYLATE 5 MG/1
5 TABLET ORAL DAILY
Qty: 90 TABLET | Refills: 1 | Status: SHIPPED | OUTPATIENT
Start: 2025-03-14

## 2025-03-14 RX ORDER — SPIRONOLACTONE 25 MG/1
25 TABLET ORAL DAILY
Qty: 90 TABLET | Refills: 1 | Status: SHIPPED | OUTPATIENT
Start: 2025-03-14

## 2025-03-14 SDOH — ECONOMIC STABILITY: FOOD INSECURITY: WITHIN THE PAST 12 MONTHS, YOU WORRIED THAT YOUR FOOD WOULD RUN OUT BEFORE YOU GOT MONEY TO BUY MORE.: NEVER TRUE

## 2025-03-14 SDOH — ECONOMIC STABILITY: FOOD INSECURITY: WITHIN THE PAST 12 MONTHS, THE FOOD YOU BOUGHT JUST DIDN'T LAST AND YOU DIDN'T HAVE MONEY TO GET MORE.: NEVER TRUE

## 2025-03-14 ASSESSMENT — PATIENT HEALTH QUESTIONNAIRE - PHQ9
SUM OF ALL RESPONSES TO PHQ QUESTIONS 1-9: 0
2. FEELING DOWN, DEPRESSED OR HOPELESS: NOT AT ALL
1. LITTLE INTEREST OR PLEASURE IN DOING THINGS: NOT AT ALL

## 2025-03-14 NOTE — ASSESSMENT & PLAN NOTE
Bilirubin remains 1.1  Indirect is greater than direct  GGT normal at 29  1/2024 and 8/2024 CT chest revealed liver with hepatosteatosis    The diagnosis of Gilbert's syndrome is based on the combination of slightly elevated total bilirubin at 1.1, normal indirect and direct bilirubin levels, and the presence of hepatosteatosis. This condition is characterized by the gallbladder not clearing out all waste at once, leading to intermittent elevations in bilirubin. There is no treatment required for Gilbert's syndrome, but the patient has been informed about the condition.  Patient is asymptomatic    10/23/2024:  Her bilirubin level is slightly elevated at 1.1, while all other liver tests are within normal limits. This could be due to a lab error or Gilbert's syndrome. Bilirubin will be rechecked in 3 months along with other labs to confirm the diagnosis. She will provide a urine sample for urinalysis.

## 2025-03-14 NOTE — PROGRESS NOTES
[facility] [address]  [address]  [place] [address]  [phone] office/[phone] fax    3/14/2025    Amanda Hernández 1965 is a pleasant Black /  female.       History of Present Illness  The patient presents for evaluation of Gilbert's syndrome, diabetes mellitus, and hypercalcemia.    She is currently not on any calcium supplements.     She reports no shortness of breath or chest pain. Additionally, she does not experience any edema in her lower extremities.    She was seen in the weight loss center for obesity under [facility] and was being charged $110 a visit for Ozempic.  She is requesting her Ozempic prescription be refilled at this office.  Informed patient I do not treat for weight loss but with her A1c, Ozempic would be of great benefit for her diabetes.    She is currently on a regimen of metformin 1000 mg daily.     She continues letrozole for her breast cancer. Her hematologist oncologist is Dr. Woodard. Last time she saw her was in January. She sees her every 6 months. She should be seeing her again in June or July.     Patient has arthritis in both knees.  Today she is complaining of a pain score of 4 out of 10 especially when standing.  She is seeking a referral to Ortho.      Physical Exam      Physical Exam  Constitutional:       Appearance: Normal appearance.   Eyes:      Extraocular Movements: Extraocular movements intact.      Conjunctiva/sclera: Conjunctivae normal.   Cardiovascular:      Rate and Rhythm: Normal rate and regular rhythm.      Heart sounds: Normal heart sounds.   Pulmonary:      Effort: Pulmonary effort is normal.      Breath sounds: Normal breath sounds.   Musculoskeletal:      Comments: Noted patient with ataxic gait  Limping on the right knee   Skin:     General: Skin is dry.   Neurological:      Mental Status: She is alert and oriented to person, place, and time.   Psychiatric:         Mood and Affect: Mood normal.

## 2025-03-14 NOTE — ASSESSMENT & PLAN NOTE
A1c 6.3-6.1  Continue metformin 1000 mg daily  Initiate Ozempic 2 mg weekly  Patient was taking Ozempic via weight loss center but is no longer seeing them that provider due to cost.  I believe Ozempic would be beneficial for patient's diabetes.  Patient was informed that I do not treat for weight loss using GLP-1's but I will refill this medication for her diabetes.  Recheck A1c 6 months    10/23/2024:  A1c 6.1-6.3  Microalbumin normal at 2.16  Urine creatinine elevated 226  Increase metformin from 500 mg daily to 1000 mg daily  Reduce carbs and sweets in diet  Reduce calories by exercise  Increase water intake  Recheck A1c 3 months

## 2025-03-14 NOTE — ASSESSMENT & PLAN NOTE
Calcium level slightly elevated at 10.2  Patient is under the care of of Dr. Woodard, hematologist/oncologist  Patient is taking letrozole which could increase calcium levels  Will monitor in 6 months

## 2025-03-14 NOTE — ASSESSMENT & PLAN NOTE
BP well-controlled  Continue amlodipine 5 mg daily  Continue spironolactone 25 mg daily  Potassium 3.8  eGFR 66; creatinine 1.00  Recheck CMP 6 months

## 2025-03-14 NOTE — ASSESSMENT & PLAN NOTE
Patient was seen weight loss center, [facility], and receiving Ozempic 2 mg weekly.  Due to cost, she is unable to return back to the weight loss center  She is requesting refill of Ozempic from this office.  I informed patient I do not treat for weight, but she is a diabetic and I will be glad to refill her Ozempic 2 mg weekly to help control her diabetes.  Patient verbalized understanding.    10/23/2024:  Weight down from 296-283.  Continue to follow-up with [facility]  Continue Ozempic as prescribed by the weight loss center  BMI is out of normal parameters and plan is as follows: Discussed in great detail on diet, portion control, exercise, avoiding foods high in sugar, carbs and starches, fatty/greasy foods and to eat until satisfied not til full. I have counseled this patient on diet and exercise regimens as well

## 2025-03-14 NOTE — TELEPHONE ENCOUNTER
Pt stated that she did not bring the form to her appt today due to changing her mind on returning to her previous job. No further action needed.

## 2025-03-14 NOTE — PROGRESS NOTES
Amanda Hernández is a 59 y.o. female (: 1965) presenting to address:    Chief Complaint   Patient presents with    3 Month Follow-Up       Vitals:    25 0813   BP: 108/68   Pulse: 83   Resp: 18   Temp: 97.2 °F (36.2 °C)   SpO2: 100%       \"Have you been to the ER, urgent care clinic since your last visit?  Hospitalized since your last visit?\"    NO    “Have you seen or consulted any other health care providers outside of [facility] since your last visit?”    NO

## 2025-04-01 ENCOUNTER — OFFICE VISIT (OUTPATIENT)
Age: 60
End: 2025-04-01

## 2025-04-01 VITALS — WEIGHT: 266 LBS | HEIGHT: 67 IN | BODY MASS INDEX: 41.75 KG/M2

## 2025-04-01 DIAGNOSIS — M17.0 PRIMARY OSTEOARTHRITIS OF BOTH KNEES: ICD-10-CM

## 2025-04-01 DIAGNOSIS — M17.11 PRIMARY OSTEOARTHRITIS OF RIGHT KNEE: ICD-10-CM

## 2025-04-01 DIAGNOSIS — M25.561 CHRONIC PAIN OF BOTH KNEES: Primary | ICD-10-CM

## 2025-04-01 DIAGNOSIS — G89.29 CHRONIC PAIN OF BOTH KNEES: Primary | ICD-10-CM

## 2025-04-01 DIAGNOSIS — M25.562 CHRONIC PAIN OF BOTH KNEES: Primary | ICD-10-CM

## 2025-04-01 RX ORDER — TRIAMCINOLONE ACETONIDE 40 MG/ML
40 INJECTION, SUSPENSION INTRA-ARTICULAR; INTRAMUSCULAR ONCE
Status: COMPLETED | OUTPATIENT
Start: 2025-04-01 | End: 2025-04-01

## 2025-04-01 RX ADMIN — TRIAMCINOLONE ACETONIDE 40 MG: 40 INJECTION, SUSPENSION INTRA-ARTICULAR; INTRAMUSCULAR at 14:48

## 2025-04-01 NOTE — PROGRESS NOTES
Amanda Hernández  1965   Chief Complaint   Patient presents with    Knee Pain     Feliz         HISTORY OF PRESENT ILLNESS  Amanda Hernández is a 59 y.o. female who presents today for evaluation of bilateral knee pain. She has had worsening pain on the left for years but over the last few months has also had pain on the right.  Pain is a 10/10. Pain is present with prolonged standing and walking. She has pain with stairs and kneeling. She has diabetes. She has been working on weight loss and her weight is down from over 300 pounds.  Has tried following treatments: Injections:No; Brace:No; Therapy:No; Cane/Crutch:No      No Known Allergies     Past Medical History:   Diagnosis Date    Arthritis     Class 3 severe obesity due to excess calories with serious comorbidity and body mass index (BMI) of 45.0 to 49.9 in adult 05/05/2023    Essential hypertension 05/24/2021    Hypercalcemia 03/14/2025    Hypertension     Malignant neoplasm of right female breast 10/20/2020    Dr Woodard (including lymph nodes) chemo/radiation.  T4dN2,ER/NH+,HER2-    Mixed hyperlipidemia 05/24/2021    Sleep apnea     CPAP    Type 2 diabetes mellitus with hyperglycemia, without long-term current use of insulin (HCC) 11/11/2022    A1c 6.6-6.1 Cont metformin therapy      Social History       Tobacco History       Smoking Status  Former Quit Date  10/30/2020 Average Packs/Day  0.5 packs/day for 4.0 years (2.0 ttl pk-yrs) Smoking Tobacco Type  Cigarettes quit in 10/30/2020   Pack Year History     Packs/Day From To Years    0 10/30/2020  4.4    0.5   4.0      Smokeless Tobacco Use  Never              Alcohol History       Alcohol Use Status  Not Currently              Drug Use       Drug Use Status  Never              Sexual Activity       Sexually Active  Yes Partners  Male Birth Control/Protection  None                   Past Surgical History:   Procedure Laterality Date    BREAST BIOPSY Right     9/2020    BREAST LUMPECTOMY Left

## 2025-04-08 ENCOUNTER — OFFICE VISIT (OUTPATIENT)
Age: 60
End: 2025-04-08

## 2025-04-08 DIAGNOSIS — M17.0 PRIMARY OSTEOARTHRITIS OF BOTH KNEES: ICD-10-CM

## 2025-04-08 DIAGNOSIS — M25.562 LEFT KNEE PAIN, UNSPECIFIED CHRONICITY: Primary | ICD-10-CM

## 2025-04-08 RX ORDER — TRIAMCINOLONE ACETONIDE 40 MG/ML
40 INJECTION, SUSPENSION INTRA-ARTICULAR; INTRAMUSCULAR ONCE
Status: COMPLETED | OUTPATIENT
Start: 2025-04-08 | End: 2025-04-08

## 2025-04-08 RX ADMIN — TRIAMCINOLONE ACETONIDE 40 MG: 40 INJECTION, SUSPENSION INTRA-ARTICULAR; INTRAMUSCULAR at 15:08

## 2025-04-08 NOTE — PROGRESS NOTES
Amanda Hernández  1965   No chief complaint on file.       HISTORY OF PRESENT ILLNESS  Amanda Hernández is a 59 y.o. female who presents today for evaluation of bilateral knee pain. She had a cortisone injection on the right side at her last overview 4/1/2025 and feels like it may already be wearing off. She is here to have the left knee injected. Pain is a 6/10. Pain is present with prolonged standing and walking. She has pain with stairs and kneeling. She has diabetes. She has been working on weight loss and her weight is down from over 300 pounds.  Has tried following treatments: Injections:Yes; Brace:No; Therapy:No; Cane/Crutch:No      No Known Allergies     Past Medical History:   Diagnosis Date    Arthritis     Class 3 severe obesity due to excess calories with serious comorbidity and body mass index (BMI) of 45.0 to 49.9 in adult 05/05/2023    Essential hypertension 05/24/2021    Hypercalcemia 03/14/2025    Hypertension     Malignant neoplasm of right female breast 10/20/2020    Dr Woodard (including lymph nodes) chemo/radiation.  T4dN2,ER/AZ+,HER2-    Mixed hyperlipidemia 05/24/2021    Sleep apnea     CPAP    Type 2 diabetes mellitus with hyperglycemia, without long-term current use of insulin (HCC) 11/11/2022    A1c 6.6-6.1 Cont metformin therapy      Social History       Tobacco History       Smoking Status  Former Quit Date  10/30/2020 Average Packs/Day  0.5 packs/day for 4.0 years (2.0 ttl pk-yrs) Smoking Tobacco Type  Cigarettes quit in 10/30/2020   Pack Year History     Packs/Day From To Years    0 10/30/2020  4.4    0.5   4.0      Smokeless Tobacco Use  Never              Alcohol History       Alcohol Use Status  Not Currently              Drug Use       Drug Use Status  Never              Sexual Activity       Sexually Active  Yes Partners  Male Birth Control/Protection  None                   Past Surgical History:   Procedure Laterality Date    BREAST BIOPSY Right     9/2020

## 2025-04-27 DIAGNOSIS — E11.65 TYPE 2 DIABETES MELLITUS WITH HYPERGLYCEMIA, WITHOUT LONG-TERM CURRENT USE OF INSULIN (HCC): ICD-10-CM

## 2025-04-28 RX ORDER — SEMAGLUTIDE 2.68 MG/ML
INJECTION, SOLUTION SUBCUTANEOUS
Qty: 9 ML | Refills: 1 | Status: SHIPPED | OUTPATIENT
Start: 2025-04-28

## 2025-04-28 NOTE — TELEPHONE ENCOUNTER
Last Office visit:  3/14/2025    Last Filled: 3/14/2025; Qty 3 w/ 1 refill    Follow up visit:    Future Appointments   Date Time Provider Department Center   4/30/2025  8:40 AM Bhavana Garibay, PT [id number]   5/15/2025  3:15 PM Gato Coon DO [facility]   9/2/2025  8:30 AM [facility]   9/4/2025  2:45 PM Chilango Mo MD  [facility]   9/9/2025  8:30 AM [facility]

## (undated) DEVICE — SYR 10ML LUER LOK 1/5ML GRAD --

## (undated) DEVICE — SYR 50ML SLIP TIP NSAF LF STRL --

## (undated) DEVICE — CANNULA ORIG TL CLR W FOAM CUSHIONS AND 14FT SUPL TB 3 CHN

## (undated) DEVICE — MEDI-VAC NON-CONDUCTIVE SUCTION TUBING: Brand: CARDINAL HEALTH

## (undated) DEVICE — YANKAUER,SMOOTH HANDLE,HIGH CAPACITY: Brand: MEDLINE INDUSTRIES, INC.

## (undated) DEVICE — CANNULA NSL AD TBNG L14FT STD PVC O2 CRV CONN NONFLARED NSL

## (undated) DEVICE — GOWN ISOL IMPERV UNIV, DISP, OPEN BACK, BLUE --

## (undated) DEVICE — FLUFF AND POLYMER UNDERPAD,EXTRA HEAVY: Brand: WINGS

## (undated) DEVICE — LINER SUCT CANSTR 3000CC PLAS SFT PRE ASSEMB W/OUT TBNG W/

## (undated) DEVICE — CATHETER SUCT TR FL TIP 14FR W/ O CTRL

## (undated) DEVICE — SOLUTION IRRIG 1000ML H2O STRL BLT

## (undated) DEVICE — SYR 20ML LL STRL LF --

## (undated) DEVICE — CATHETER THOR 36FR DIA10.7MM POLYVI CHL TRCR TIP STR SFT

## (undated) DEVICE — SYRINGE MED 25GA 3ML L5/8IN SUBQ PLAS W/ DETACH NDL SFTY

## (undated) DEVICE — GAUZE,SPONGE,4"X4",16PLY,STRL,LF,10/TRAY: Brand: MEDLINE

## (undated) DEVICE — ENDOSCOPY PUMP TUBING/ CAP SET: Brand: ERBE